# Patient Record
Sex: FEMALE | Race: WHITE | NOT HISPANIC OR LATINO | Employment: UNEMPLOYED | URBAN - METROPOLITAN AREA
[De-identification: names, ages, dates, MRNs, and addresses within clinical notes are randomized per-mention and may not be internally consistent; named-entity substitution may affect disease eponyms.]

---

## 2017-06-01 ENCOUNTER — OFFICE VISIT (OUTPATIENT)
Dept: OPTOMETRY | Facility: CLINIC | Age: 62
End: 2017-06-01
Payer: COMMERCIAL

## 2017-06-01 DIAGNOSIS — H04.123 DRY EYE SYNDROME, BILATERAL: ICD-10-CM

## 2017-06-01 DIAGNOSIS — H25.13 NUCLEAR SCLEROSIS, BILATERAL: Primary | ICD-10-CM

## 2017-06-01 DIAGNOSIS — H52.7 REFRACTIVE ERROR: ICD-10-CM

## 2017-06-01 DIAGNOSIS — H31.003 CHORIORETINAL SCAR, BILATERAL: ICD-10-CM

## 2017-06-01 PROCEDURE — 92015 DETERMINE REFRACTIVE STATE: CPT | Mod: S$GLB,,, | Performed by: OPTOMETRIST

## 2017-06-01 PROCEDURE — 99999 PR PBB SHADOW E&M-EST. PATIENT-LVL II: CPT | Mod: PBBFAC,,, | Performed by: OPTOMETRIST

## 2017-06-01 PROCEDURE — 92014 COMPRE OPH EXAM EST PT 1/>: CPT | Mod: S$GLB,,, | Performed by: OPTOMETRIST

## 2017-06-01 NOTE — PROGRESS NOTES
HPI     Presenting Complaint: Pt here today for yearly eye exam.     Pt states distance vision is stable with current glasses. Uses OTC readers   for small print.     (+) OTC allergy drops as needed for itching     (-)pain  (-) headaches  (-) diplopia   (-) flashes / (-) floaters      Last edited by Saad Hernandez, OD on 6/1/2017  2:31 PM. (History)        ROS     Positive for: Eyes    Negative for: Constitutional, Gastrointestinal, Neurological, Skin,   Genitourinary, Musculoskeletal, HENT, Endocrine, Cardiovascular,   Respiratory, Psychiatric, Allergic/Imm, Heme/Lymph    Last edited by Saad Hernandez, OD on 6/1/2017  2:31 PM. (History)        Assessment /Plan     For exam results, see Encounter Report.    Nuclear sclerosis, bilateral    Refractive error    Chorioretinal scar, bilateral    Dry eye syndrome, bilateral      Mild NS OU, OD > OS. Discussed possible ocular affects of cataracts. Acceptable BCVA OU. Discussed treatment options. Surgery not recommended at this time. Monitor yearly.     Dispensed updated spectacle Rx. Discussed various spectacle lens options. Discussed adaptation period to new specs.     Chorioretinal scar s/p laser repair OU for retinal tear/hole. Retina flat, intact, no holes, tears, breaks, RD. Monitor yearly.     Occasional GABRIELA OU, recommend otc artificial tears as needed. Return if symptoms worsening or fail to improve with tear use.     RTC in 1 year for comprehensive eye exam, or sooner prn.

## 2017-07-14 ENCOUNTER — OFFICE VISIT (OUTPATIENT)
Dept: OPHTHALMOLOGY | Facility: CLINIC | Age: 62
End: 2017-07-14
Payer: COMMERCIAL

## 2017-07-14 DIAGNOSIS — H33.311 RETINAL TEAR OF RIGHT EYE: ICD-10-CM

## 2017-07-14 DIAGNOSIS — H43.813 POSTERIOR VITREOUS DETACHMENT, BILATERAL: Primary | ICD-10-CM

## 2017-07-14 DIAGNOSIS — H33.322 RETINA HOLE, LEFT: ICD-10-CM

## 2017-07-14 PROCEDURE — 92226 PR SPECIAL EYE EXAM, SUBSEQUENT: CPT | Mod: RT,S$GLB,, | Performed by: OPHTHALMOLOGY

## 2017-07-14 PROCEDURE — 92014 COMPRE OPH EXAM EST PT 1/>: CPT | Mod: S$GLB,,, | Performed by: OPHTHALMOLOGY

## 2017-07-14 PROCEDURE — 99999 PR PBB SHADOW E&M-EST. PATIENT-LVL II: CPT | Mod: PBBFAC,,, | Performed by: OPHTHALMOLOGY

## 2017-07-14 RX ORDER — BENZOCAINE .13; .15; .5; 2 G/100G; G/100G; G/100G; G/100G
1 GEL ORAL DAILY
COMMUNITY
End: 2018-11-07 | Stop reason: ALTCHOICE

## 2017-07-14 NOTE — PROGRESS NOTES
HPI     DLS [id number] Dr. Aguilera           6/1/17 Dr. Hernandez    60 y/o F states approx 4 mo ago started having flashes OD.  They have   increased in frequency; used to be once daily, now few times per day.  No   flashes OS.  No floaters noticed OU.  Va stable OU.  VF full OU.    HX Retinal Tear  W/ heme OD s/p Retinopexy with Focal laser X2        Retinal Hole OS s/p Retinopexy       Last edited by Valerio Self MD on 7/14/2017  4:04 PM. (History)            Assessment /Plan     For exam results, see Encounter Report.    Posterior vitreous detachment, bilateral    Retinal tear of right eye    Retina hole, left      Stable  No new breaks    Pathology of PVD, Retinal Tear, Retinal Detachment reviewed in great detail  RD precautions discussed in detail, patient expressed understanding  RTC q 1 yr, sooner PRN (especially ANY change flashes, floaters, vision, visual field)

## 2017-07-31 ENCOUNTER — OFFICE VISIT (OUTPATIENT)
Dept: OPTOMETRY | Facility: CLINIC | Age: 62
End: 2017-07-31
Payer: COMMERCIAL

## 2017-07-31 DIAGNOSIS — H10.13 ALLERGIC CONJUNCTIVITIS, BILATERAL: Primary | ICD-10-CM

## 2017-07-31 PROCEDURE — 92012 INTRM OPH EXAM EST PATIENT: CPT | Mod: S$GLB,,, | Performed by: OPTOMETRIST

## 2017-07-31 PROCEDURE — 99999 PR PBB SHADOW E&M-EST. PATIENT-LVL II: CPT | Mod: PBBFAC,,, | Performed by: OPTOMETRIST

## 2017-07-31 RX ORDER — FLUOROMETHOLONE 1 MG/ML
1 SUSPENSION/ DROPS OPHTHALMIC 4 TIMES DAILY
Qty: 5 ML | Refills: 0 | Status: SHIPPED | OUTPATIENT
Start: 2017-07-31 | End: 2017-11-01

## 2017-07-31 NOTE — PROGRESS NOTES
HPI     Presenting Complaint: Pt here today for red, itchy, sensitive to light,   feels like sand in is both eyes x 4 weeks.     Location: Both eyes  Pain: : Level 1 or 2 today  Timing: the last 4 weeks  Ophthalmic medication / drops: OTC allergy drops 2 x day    (+) Crusty eyes in the morning and tear through out the day    (-) headaches  (-) diplopia   (-) No new flashes / (-)No new floaters      Last edited by Saad Hernandez, OD on 7/31/2017  1:20 PM. (History)            Assessment /Plan     For exam results, see Encounter Report.    Allergic conjunctivitis, bilateral  -     fluorometholone 0.1% (FML) 0.1 % DrpS; Place 1 drop into both eyes 4 (four) times daily.  Dispense: 5 mL; Refill: 0      Allergic conjunctivitis OU, OD >> OS. Continue OTC allergy drops twice daily. Start FML 0.1%: 1 gt qid OU, shake well. OTC benadryl as needed. Cool compresses for comfort. Return on Friday for f/u, or sooner if any worsening.

## 2017-10-31 ENCOUNTER — DOCUMENTATION ONLY (OUTPATIENT)
Dept: FAMILY MEDICINE | Facility: CLINIC | Age: 62
End: 2017-10-31

## 2017-10-31 NOTE — PROGRESS NOTES
Pre-Visit Chart Review  For Appointment Scheduled on 11/01/17    Health Maintenance Due   Topic Date Due    TETANUS VACCINE  11/06/1973    Influenza Vaccine  08/01/2017    Mammogram  11/08/2017

## 2017-11-01 ENCOUNTER — OFFICE VISIT (OUTPATIENT)
Dept: FAMILY MEDICINE | Facility: CLINIC | Age: 62
End: 2017-11-01
Payer: COMMERCIAL

## 2017-11-01 ENCOUNTER — LAB VISIT (OUTPATIENT)
Dept: LAB | Facility: HOSPITAL | Age: 62
End: 2017-11-01
Attending: PHYSICIAN ASSISTANT
Payer: COMMERCIAL

## 2017-11-01 VITALS
TEMPERATURE: 98 F | SYSTOLIC BLOOD PRESSURE: 131 MMHG | HEART RATE: 63 BPM | HEIGHT: 66 IN | DIASTOLIC BLOOD PRESSURE: 70 MMHG | BODY MASS INDEX: 22.43 KG/M2 | WEIGHT: 139.56 LBS

## 2017-11-01 DIAGNOSIS — E61.1 LOW IRON: ICD-10-CM

## 2017-11-01 DIAGNOSIS — Z00.00 ANNUAL PHYSICAL EXAM: Primary | ICD-10-CM

## 2017-11-01 DIAGNOSIS — Z13.220 SCREENING CHOLESTEROL LEVEL: ICD-10-CM

## 2017-11-01 DIAGNOSIS — R53.83 FATIGUE, UNSPECIFIED TYPE: ICD-10-CM

## 2017-11-01 DIAGNOSIS — Z23 NEEDS FLU SHOT: ICD-10-CM

## 2017-11-01 DIAGNOSIS — Z23 NEED FOR TETANUS BOOSTER: ICD-10-CM

## 2017-11-01 DIAGNOSIS — Z12.39 SCREENING FOR BREAST CANCER: ICD-10-CM

## 2017-11-01 DIAGNOSIS — Z00.00 ANNUAL PHYSICAL EXAM: ICD-10-CM

## 2017-11-01 LAB
25(OH)D3+25(OH)D2 SERPL-MCNC: 31 NG/ML
ALBUMIN SERPL BCP-MCNC: 3.3 G/DL
ALP SERPL-CCNC: 92 U/L
ALT SERPL W/O P-5'-P-CCNC: 26 U/L
ANION GAP SERPL CALC-SCNC: 6 MMOL/L
AST SERPL-CCNC: 27 U/L
BASOPHILS # BLD AUTO: 0.05 K/UL
BASOPHILS NFR BLD: 1.4 %
BILIRUB SERPL-MCNC: 1 MG/DL
BUN SERPL-MCNC: 9 MG/DL
CALCIUM SERPL-MCNC: 9.3 MG/DL
CHLORIDE SERPL-SCNC: 106 MMOL/L
CHOLEST SERPL-MCNC: 239 MG/DL
CHOLEST/HDLC SERPL: 4 {RATIO}
CO2 SERPL-SCNC: 29 MMOL/L
CREAT SERPL-MCNC: 0.9 MG/DL
DIFFERENTIAL METHOD: ABNORMAL
EOSINOPHIL # BLD AUTO: 0.1 K/UL
EOSINOPHIL NFR BLD: 3.1 %
ERYTHROCYTE [DISTWIDTH] IN BLOOD BY AUTOMATED COUNT: 13.1 %
EST. GFR  (AFRICAN AMERICAN): >60 ML/MIN/1.73 M^2
EST. GFR  (NON AFRICAN AMERICAN): >60 ML/MIN/1.73 M^2
FERRITIN SERPL-MCNC: 44 NG/ML
GLUCOSE SERPL-MCNC: 79 MG/DL
HCT VFR BLD AUTO: 42.1 %
HDLC SERPL-MCNC: 60 MG/DL
HDLC SERPL: 25.1 %
HGB BLD-MCNC: 13.8 G/DL
IMM GRANULOCYTES # BLD AUTO: 0 K/UL
IMM GRANULOCYTES NFR BLD AUTO: 0 %
IRON SERPL-MCNC: 92 UG/DL
LDLC SERPL CALC-MCNC: 162.2 MG/DL
LYMPHOCYTES # BLD AUTO: 1 K/UL
LYMPHOCYTES NFR BLD: 27.4 %
MCH RBC QN AUTO: 30.2 PG
MCHC RBC AUTO-ENTMCNC: 32.8 G/DL
MCV RBC AUTO: 92 FL
MONOCYTES # BLD AUTO: 0.3 K/UL
MONOCYTES NFR BLD: 8.1 %
NEUTROPHILS # BLD AUTO: 2.2 K/UL
NEUTROPHILS NFR BLD: 60 %
NONHDLC SERPL-MCNC: 179 MG/DL
NRBC BLD-RTO: 0 /100 WBC
PLATELET # BLD AUTO: 168 K/UL
PMV BLD AUTO: 11.4 FL
POTASSIUM SERPL-SCNC: 4.7 MMOL/L
PROT SERPL-MCNC: 7.1 G/DL
RBC # BLD AUTO: 4.57 M/UL
SATURATED IRON: 26 %
SODIUM SERPL-SCNC: 141 MMOL/L
TOTAL IRON BINDING CAPACITY: 354 UG/DL
TRANSFERRIN SERPL-MCNC: 239 MG/DL
TRIGL SERPL-MCNC: 84 MG/DL
TSH SERPL DL<=0.005 MIU/L-ACNC: 2.68 UIU/ML
VIT B12 SERPL-MCNC: 373 PG/ML
WBC # BLD AUTO: 3.58 K/UL

## 2017-11-01 PROCEDURE — 82728 ASSAY OF FERRITIN: CPT

## 2017-11-01 PROCEDURE — 85025 COMPLETE CBC W/AUTO DIFF WBC: CPT

## 2017-11-01 PROCEDURE — 90472 IMMUNIZATION ADMIN EACH ADD: CPT | Mod: S$GLB,,, | Performed by: FAMILY MEDICINE

## 2017-11-01 PROCEDURE — 90686 IIV4 VACC NO PRSV 0.5 ML IM: CPT | Mod: S$GLB,,, | Performed by: FAMILY MEDICINE

## 2017-11-01 PROCEDURE — 99396 PREV VISIT EST AGE 40-64: CPT | Mod: S$GLB,,, | Performed by: PHYSICIAN ASSISTANT

## 2017-11-01 PROCEDURE — 99999 PR PBB SHADOW E&M-EST. PATIENT-LVL III: CPT | Mod: PBBFAC,,, | Performed by: PHYSICIAN ASSISTANT

## 2017-11-01 PROCEDURE — 80061 LIPID PANEL: CPT

## 2017-11-01 PROCEDURE — 90471 IMMUNIZATION ADMIN: CPT | Mod: S$GLB,,, | Performed by: FAMILY MEDICINE

## 2017-11-01 PROCEDURE — 80053 COMPREHEN METABOLIC PANEL: CPT

## 2017-11-01 PROCEDURE — 82607 VITAMIN B-12: CPT

## 2017-11-01 PROCEDURE — 90715 TDAP VACCINE 7 YRS/> IM: CPT | Mod: S$GLB,,, | Performed by: FAMILY MEDICINE

## 2017-11-01 PROCEDURE — 83540 ASSAY OF IRON: CPT

## 2017-11-01 PROCEDURE — 84443 ASSAY THYROID STIM HORMONE: CPT

## 2017-11-01 PROCEDURE — 82306 VITAMIN D 25 HYDROXY: CPT

## 2017-11-01 PROCEDURE — 36415 COLL VENOUS BLD VENIPUNCTURE: CPT | Mod: PO

## 2017-11-01 RX ORDER — TRIAMCINOLONE ACETONIDE 1 MG/G
CREAM TOPICAL 2 TIMES DAILY PRN
Qty: 45 G | Refills: 1 | Status: SHIPPED | OUTPATIENT
Start: 2017-11-01 | End: 2018-11-07

## 2017-11-01 RX ORDER — MECLIZINE HYDROCHLORIDE 25 MG/1
25 TABLET ORAL 3 TIMES DAILY PRN
Qty: 30 TABLET | Refills: 1 | Status: SHIPPED | OUTPATIENT
Start: 2017-11-01 | End: 2018-11-07 | Stop reason: SDUPTHER

## 2017-11-01 RX ORDER — PHENAZOPYRIDINE HYDROCHLORIDE 200 MG/1
200 TABLET, FILM COATED ORAL EVERY 6 HOURS PRN
Qty: 30 TABLET | Refills: 2 | Status: SHIPPED | OUTPATIENT
Start: 2017-11-01 | End: 2018-11-07 | Stop reason: SDUPTHER

## 2017-11-01 RX ORDER — ESCITALOPRAM OXALATE 10 MG/1
10 TABLET ORAL DAILY
Qty: 90 TABLET | Refills: 3 | Status: SHIPPED | OUTPATIENT
Start: 2017-11-01 | End: 2018-11-07 | Stop reason: SDUPTHER

## 2017-11-01 NOTE — PROGRESS NOTES
Subjective:       Patient ID: Tamica Cardona is a 61 y.o. female.    Chief Complaint: Annual Exam    HPI   Patient is a 61 year old  female IC, Allergies, History of breast cancer, psoriasis presenting to the clinic for annual physical exam. She is doing well at this time, but is concerned about being more fatigued than normal. She will be due for mammogram 11/8/17. We will place orders today. She is also due for fasting labs. She agrees to get flu & tdap vaccinations today.    Review of Systems   Constitutional: Positive for fatigue. Negative for activity change, appetite change, chills, diaphoresis and fever.   HENT: Positive for hearing loss. Negative for congestion, postnasal drip and rhinorrhea.    Respiratory: Negative.  Negative for cough, shortness of breath and wheezing.    Cardiovascular: Negative.  Negative for chest pain.   Gastrointestinal: Negative for abdominal pain, blood in stool, constipation, diarrhea, nausea and vomiting.   Genitourinary: Negative for dysuria, frequency, hematuria and urgency.   Musculoskeletal: Negative.    Skin: Negative.  Negative for color change and rash.   Neurological: Negative for dizziness and syncope.   Psychiatric/Behavioral: Negative for agitation, behavioral problems and confusion.       Objective:      Physical Exam   Constitutional: Vital signs are normal. She appears well-developed and well-nourished. No distress.   HENT:   Head: Normocephalic and atraumatic.   Right Ear: External ear normal. Decreased hearing is noted.   Left Ear: External ear normal. Decreased hearing is noted.   Nose: Nose normal.   Mouth/Throat: Oropharynx is clear and moist.   Wearing hearing aids bilaterally   Eyes: Conjunctivae are normal. Pupils are equal, round, and reactive to light.   Cardiovascular: Normal rate, regular rhythm, S1 normal, S2 normal and normal heart sounds.  Exam reveals no gallop.    No murmur heard.  Pulses:       Radial pulses are 2+ on the right side,  and 2+ on the left side.   <2sec cap refill fingers bilat     Pulmonary/Chest: Effort normal and breath sounds normal. No respiratory distress. She has no wheezes. She has no rhonchi.   Abdominal: Soft. Bowel sounds are normal. She exhibits no distension and no mass. There is no tenderness. There is no rebound and no guarding.   Skin: Skin is warm and dry. She is not diaphoretic.   Appropriate skin turgor   Psychiatric: She has a normal mood and affect. Her speech is normal and behavior is normal. Judgment and thought content normal. Cognition and memory are normal.       Assessment:       1. Annual physical exam    2. Fatigue, unspecified type    3. Low iron    4. Screening cholesterol level    5. Screening for breast cancer    6. Need for tetanus booster    7. Needs flu shot        Plan:       Tamica was seen today for annual exam.    Diagnoses and all orders for this visit:    Annual physical exam  -     CBC auto differential; Future  -     Comprehensive metabolic panel; Future  -     Lipid panel; Future  -     Vitamin D; Future    Fatigue, unspecified type  -     TSH; Future  -     Vitamin B12; Future    Low iron  -     Iron and TIBC; Future  -     Ferritin; Future    Screening cholesterol level  -     Lipid panel; Future    Screening for breast cancer  -     Mammo Digital Screening Bilateral With CAD; Future    Need for tetanus booster  -     (In Office Administered) Tdap Vaccine    Needs flu shot  -     Influenza - Quadrivalent (3 years & older) (PF)    Other orders  -     triamcinolone acetonide 0.1% (KENALOG) 0.1 % cream; Apply topically 2 (two) times daily as needed.  -     phenazopyridine (PYRIDIUM) 200 MG tablet; Take 1 tablet (200 mg total) by mouth every 6 (six) hours as needed.  -     meclizine (ANTIVERT) 25 mg tablet; Take 1 tablet (25 mg total) by mouth 3 (three) times daily as needed.  -     escitalopram oxalate (LEXAPRO) 10 MG tablet; Take 1 tablet (10 mg total) by mouth once daily.

## 2017-11-10 ENCOUNTER — HOSPITAL ENCOUNTER (OUTPATIENT)
Dept: RADIOLOGY | Facility: CLINIC | Age: 62
Discharge: HOME | End: 2017-11-10
Attending: PHYSICIAN ASSISTANT
Payer: COMMERCIAL

## 2017-11-10 DIAGNOSIS — Z12.39 SCREENING FOR BREAST CANCER: ICD-10-CM

## 2017-11-10 PROCEDURE — 77063 BREAST TOMOSYNTHESIS BI: CPT | Mod: 26,,, | Performed by: RADIOLOGY

## 2017-11-10 PROCEDURE — 77067 SCR MAMMO BI INCL CAD: CPT | Mod: TC

## 2017-11-10 PROCEDURE — 77067 SCR MAMMO BI INCL CAD: CPT | Mod: 26,,, | Performed by: RADIOLOGY

## 2018-10-17 ENCOUNTER — OFFICE VISIT (OUTPATIENT)
Dept: OPTOMETRY | Facility: CLINIC | Age: 63
End: 2018-10-17
Payer: COMMERCIAL

## 2018-10-17 DIAGNOSIS — H52.7 REFRACTIVE ERROR: ICD-10-CM

## 2018-10-17 DIAGNOSIS — H31.003 CHORIORETINAL SCAR, BILATERAL: Primary | ICD-10-CM

## 2018-10-17 DIAGNOSIS — H25.13 NUCLEAR SCLEROSIS, BILATERAL: ICD-10-CM

## 2018-10-17 PROCEDURE — 92014 COMPRE OPH EXAM EST PT 1/>: CPT | Mod: S$GLB,,, | Performed by: OPTOMETRIST

## 2018-10-17 PROCEDURE — 99999 PR PBB SHADOW E&M-EST. PATIENT-LVL III: CPT | Mod: PBBFAC,,, | Performed by: OPTOMETRIST

## 2018-10-17 NOTE — PROGRESS NOTES
HPI     Presenting Complaint: Pt here today for yearly ocular health check. Pt   states vision has stable with current glasses, Pt uses rx distance and rx   glasses for near.     Ophthalmic medication / drops:None    (-) headaches  (-) diplopia   (-) flashes / (-) floaters      Last edited by Jaylen Nix on 10/17/2018  8:08 AM. (History)            Assessment /Plan     For exam results, see Encounter Report.    Chorioretinal scar, bilateral    Nuclear sclerosis, bilateral    Refractive error      Chorioretinal scar s/p laser repair OU for retinal tear/hole. Retina flat, intact, no holes, tears, breaks, RD OU. RTC immediately if any flashes, floaters, decreased vision, veiling of vision, or trauma occurs. Otherwise, monitor yearly.     Mild cataracts of both eyes, not visually significant. Discussed possible ocular affects of cataracts. Acceptable BCVA OU. Discussed treatment options. Surgery not recommended at this time. Monitor yearly.     Patient doing well with current specs, SVL distance and near only. Denies refraction. Return as needed for updated spec Rx.      RTC in 1 year for comprehensive eye exam, or sooner prn.

## 2018-11-06 ENCOUNTER — DOCUMENTATION ONLY (OUTPATIENT)
Dept: FAMILY MEDICINE | Facility: CLINIC | Age: 63
End: 2018-11-06

## 2018-11-06 NOTE — PROGRESS NOTES
Pre-Visit Chart Review  For Appointment Scheduled on 11/07/18    Health Maintenance Due   Topic Date Due    Influenza Vaccine  08/01/2018    Mammogram  11/10/2018

## 2018-11-07 ENCOUNTER — OFFICE VISIT (OUTPATIENT)
Dept: FAMILY MEDICINE | Facility: CLINIC | Age: 63
End: 2018-11-07
Payer: COMMERCIAL

## 2018-11-07 VITALS
SYSTOLIC BLOOD PRESSURE: 118 MMHG | DIASTOLIC BLOOD PRESSURE: 62 MMHG | WEIGHT: 142.19 LBS | TEMPERATURE: 99 F | HEART RATE: 65 BPM | HEIGHT: 66 IN | BODY MASS INDEX: 22.85 KG/M2

## 2018-11-07 DIAGNOSIS — F32.A DEPRESSION, UNSPECIFIED DEPRESSION TYPE: ICD-10-CM

## 2018-11-07 DIAGNOSIS — Z00.00 ANNUAL PHYSICAL EXAM: Primary | ICD-10-CM

## 2018-11-07 DIAGNOSIS — E78.5 HYPERLIPIDEMIA, UNSPECIFIED HYPERLIPIDEMIA TYPE: ICD-10-CM

## 2018-11-07 DIAGNOSIS — M85.80 OSTEOPENIA, UNSPECIFIED LOCATION: ICD-10-CM

## 2018-11-07 DIAGNOSIS — Z23 NEEDS FLU SHOT: ICD-10-CM

## 2018-11-07 DIAGNOSIS — Z12.39 SCREENING FOR BREAST CANCER: ICD-10-CM

## 2018-11-07 PROCEDURE — 99396 PREV VISIT EST AGE 40-64: CPT | Mod: 25,S$GLB,, | Performed by: PHYSICIAN ASSISTANT

## 2018-11-07 PROCEDURE — 90686 IIV4 VACC NO PRSV 0.5 ML IM: CPT | Mod: S$GLB,,, | Performed by: PHYSICIAN ASSISTANT

## 2018-11-07 PROCEDURE — 90471 IMMUNIZATION ADMIN: CPT | Mod: S$GLB,,, | Performed by: PHYSICIAN ASSISTANT

## 2018-11-07 PROCEDURE — 99999 PR PBB SHADOW E&M-EST. PATIENT-LVL III: CPT | Mod: PBBFAC,,, | Performed by: PHYSICIAN ASSISTANT

## 2018-11-07 RX ORDER — CLOBETASOL PROPIONATE 0.5 MG/G
OINTMENT TOPICAL 2 TIMES DAILY
Qty: 45 G | Refills: 2 | Status: SHIPPED | OUTPATIENT
Start: 2018-11-07 | End: 2018-11-16

## 2018-11-07 RX ORDER — MECLIZINE HYDROCHLORIDE 25 MG/1
25 TABLET ORAL 3 TIMES DAILY PRN
Qty: 30 TABLET | Refills: 1 | Status: SHIPPED | OUTPATIENT
Start: 2018-11-07 | End: 2019-12-02 | Stop reason: SDUPTHER

## 2018-11-07 RX ORDER — PHENAZOPYRIDINE HYDROCHLORIDE 200 MG/1
200 TABLET, FILM COATED ORAL EVERY 6 HOURS PRN
Qty: 30 TABLET | Refills: 2 | Status: SHIPPED | OUTPATIENT
Start: 2018-11-07 | End: 2022-10-10 | Stop reason: ALTCHOICE

## 2018-11-07 RX ORDER — ESCITALOPRAM OXALATE 10 MG/1
10 TABLET ORAL DAILY
Qty: 90 TABLET | Refills: 3 | Status: SHIPPED | OUTPATIENT
Start: 2018-11-07 | End: 2019-10-28 | Stop reason: SDUPTHER

## 2018-11-07 NOTE — PROGRESS NOTES
Subjective:       Patient ID: Tamica Cardona is a 63 y.o. female.    Chief Complaint: Annual Exam    HPI   Patient is a 63 year old  female presenting to the clinic for routine annual physical. She is doing well at this time. She is due for fasting labs & agrees to come back for these. Patient also due for screening mammogram and will get this scheduled as well. Patient reports lexapro is still working well for her depression symptoms. She is concerned about eczema-like patch on soles of her hand which is chronic in nature. Kenalog cream used to help some, but is no longer providing relief.   Review of Systems   Constitutional: Negative for activity change, appetite change, chills, diaphoresis, fatigue and fever.   HENT: Negative for congestion, postnasal drip and rhinorrhea.    Respiratory: Negative.  Negative for cough, shortness of breath and wheezing.    Cardiovascular: Negative.  Negative for chest pain.   Gastrointestinal: Negative for abdominal pain, blood in stool, constipation, diarrhea, nausea and vomiting.   Genitourinary: Negative for dysuria, frequency, hematuria and urgency.   Musculoskeletal: Negative.    Skin: Positive for rash. Negative for color change.   Neurological: Negative for dizziness and syncope.   Psychiatric/Behavioral: Negative for agitation, behavioral problems and confusion.       Objective:      Physical Exam   Constitutional: Vital signs are normal. She appears well-developed and well-nourished. No distress.   Cardiovascular: Normal rate, regular rhythm, S1 normal, S2 normal and normal heart sounds. Exam reveals no gallop.   No murmur heard.  Pulses:       Radial pulses are 2+ on the right side, and 2+ on the left side.   Pulmonary/Chest: Effort normal and breath sounds normal. No respiratory distress. She has no wheezes. She has no rhonchi.   Skin: Skin is warm and dry. Rash noted. She is not diaphoretic.   Dry, peeling skin on palm of right hand, minimal redness    Psychiatric: She has a normal mood and affect. Her speech is normal and behavior is normal. Judgment and thought content normal. Cognition and memory are normal.       Assessment:       1. Annual physical exam    2. Hyperlipidemia, unspecified hyperlipidemia type    3. Depression, unspecified depression type    4. Osteopenia, unspecified location    5. Screening for breast cancer    6. Needs flu shot        Plan:       Tamica was seen today for annual exam.    Diagnoses and all orders for this visit:    Annual physical exam  -     CBC auto differential; Future  -     Comprehensive metabolic panel; Future  -     Lipid panel; Future  -     TSH; Future  -     Vitamin D; Future    Hyperlipidemia, unspecified hyperlipidemia type  -     Comprehensive metabolic panel; Future  -     Lipid panel; Future    Depression, unspecified depression type  -     CBC auto differential; Future  -     Comprehensive metabolic panel; Future  -     TSH; Future    Osteopenia, unspecified location  -     CBC auto differential; Future  -     Vitamin D; Future    Screening for breast cancer  -     Mammo Digital Screening Bilat; Future    Needs flu shot  -     Influenza - Quadrivalent (3 years & older) (PF)    Other orders  -     meclizine (ANTIVERT) 25 mg tablet; Take 1 tablet (25 mg total) by mouth 3 (three) times daily as needed.  -     phenazopyridine (PYRIDIUM) 200 MG tablet; Take 1 tablet (200 mg total) by mouth every 6 (six) hours as needed.  -     escitalopram oxalate (LEXAPRO) 10 MG tablet; Take 1 tablet (10 mg total) by mouth once daily.  -     clobetasol 0.05% (TEMOVATE) 0.05 % Oint; Apply topically 2 (two) times daily.

## 2018-11-09 ENCOUNTER — LAB VISIT (OUTPATIENT)
Dept: LAB | Facility: HOSPITAL | Age: 63
End: 2018-11-09
Attending: PHYSICIAN ASSISTANT
Payer: COMMERCIAL

## 2018-11-09 DIAGNOSIS — E78.5 HYPERLIPIDEMIA, UNSPECIFIED HYPERLIPIDEMIA TYPE: ICD-10-CM

## 2018-11-09 DIAGNOSIS — F32.A DEPRESSION, UNSPECIFIED DEPRESSION TYPE: ICD-10-CM

## 2018-11-09 DIAGNOSIS — M85.80 OSTEOPENIA, UNSPECIFIED LOCATION: ICD-10-CM

## 2018-11-09 DIAGNOSIS — Z00.00 ANNUAL PHYSICAL EXAM: ICD-10-CM

## 2018-11-09 LAB
25(OH)D3+25(OH)D2 SERPL-MCNC: 28 NG/ML
ALBUMIN SERPL BCP-MCNC: 3.5 G/DL
ALP SERPL-CCNC: 79 U/L
ALT SERPL W/O P-5'-P-CCNC: 15 U/L
ANION GAP SERPL CALC-SCNC: 7 MMOL/L
AST SERPL-CCNC: 22 U/L
BASOPHILS # BLD AUTO: 0.05 K/UL
BASOPHILS NFR BLD: 1.7 %
BILIRUB SERPL-MCNC: 1.1 MG/DL
BUN SERPL-MCNC: 10 MG/DL
CALCIUM SERPL-MCNC: 9.7 MG/DL
CHLORIDE SERPL-SCNC: 109 MMOL/L
CHOLEST SERPL-MCNC: 222 MG/DL
CHOLEST/HDLC SERPL: 3.5 {RATIO}
CO2 SERPL-SCNC: 27 MMOL/L
CREAT SERPL-MCNC: 0.8 MG/DL
DIFFERENTIAL METHOD: ABNORMAL
EOSINOPHIL # BLD AUTO: 0.1 K/UL
EOSINOPHIL NFR BLD: 2.7 %
ERYTHROCYTE [DISTWIDTH] IN BLOOD BY AUTOMATED COUNT: 13.6 %
EST. GFR  (AFRICAN AMERICAN): >60 ML/MIN/1.73 M^2
EST. GFR  (NON AFRICAN AMERICAN): >60 ML/MIN/1.73 M^2
GLUCOSE SERPL-MCNC: 85 MG/DL
HCT VFR BLD AUTO: 42 %
HDLC SERPL-MCNC: 63 MG/DL
HDLC SERPL: 28.4 %
HGB BLD-MCNC: 13.3 G/DL
IMM GRANULOCYTES # BLD AUTO: 0 K/UL
IMM GRANULOCYTES NFR BLD AUTO: 0 %
LDLC SERPL CALC-MCNC: 141 MG/DL
LYMPHOCYTES # BLD AUTO: 0.9 K/UL
LYMPHOCYTES NFR BLD: 28.8 %
MCH RBC QN AUTO: 29.7 PG
MCHC RBC AUTO-ENTMCNC: 31.7 G/DL
MCV RBC AUTO: 94 FL
MONOCYTES # BLD AUTO: 0.3 K/UL
MONOCYTES NFR BLD: 10 %
NEUTROPHILS # BLD AUTO: 1.7 K/UL
NEUTROPHILS NFR BLD: 56.8 %
NONHDLC SERPL-MCNC: 159 MG/DL
NRBC BLD-RTO: 0 /100 WBC
PLATELET # BLD AUTO: 182 K/UL
PMV BLD AUTO: 11.5 FL
POTASSIUM SERPL-SCNC: 4.9 MMOL/L
PROT SERPL-MCNC: 6.8 G/DL
RBC # BLD AUTO: 4.48 M/UL
SODIUM SERPL-SCNC: 143 MMOL/L
TRIGL SERPL-MCNC: 90 MG/DL
TSH SERPL DL<=0.005 MIU/L-ACNC: 2.2 UIU/ML
WBC # BLD AUTO: 2.99 K/UL

## 2018-11-09 PROCEDURE — 82306 VITAMIN D 25 HYDROXY: CPT

## 2018-11-09 PROCEDURE — 84443 ASSAY THYROID STIM HORMONE: CPT

## 2018-11-09 PROCEDURE — 85025 COMPLETE CBC W/AUTO DIFF WBC: CPT

## 2018-11-09 PROCEDURE — 80061 LIPID PANEL: CPT

## 2018-11-09 PROCEDURE — 36415 COLL VENOUS BLD VENIPUNCTURE: CPT | Mod: PO

## 2018-11-09 PROCEDURE — 80053 COMPREHEN METABOLIC PANEL: CPT

## 2018-11-12 ENCOUNTER — HOSPITAL ENCOUNTER (OUTPATIENT)
Dept: RADIOLOGY | Facility: CLINIC | Age: 63
Discharge: HOME | End: 2018-11-12
Attending: PHYSICIAN ASSISTANT
Payer: COMMERCIAL

## 2018-11-12 DIAGNOSIS — Z12.39 SCREENING FOR BREAST CANCER: ICD-10-CM

## 2018-11-12 PROCEDURE — 77063 BREAST TOMOSYNTHESIS BI: CPT | Mod: 26,,, | Performed by: RADIOLOGY

## 2018-11-12 PROCEDURE — 77063 BREAST TOMOSYNTHESIS BI: CPT | Mod: TC,PO

## 2018-11-12 PROCEDURE — 77067 SCR MAMMO BI INCL CAD: CPT | Mod: 26,,, | Performed by: RADIOLOGY

## 2018-11-15 ENCOUNTER — PATIENT MESSAGE (OUTPATIENT)
Dept: FAMILY MEDICINE | Facility: CLINIC | Age: 63
End: 2018-11-15

## 2018-11-15 DIAGNOSIS — D72.810 LYMPHOCYTOPENIA: Primary | ICD-10-CM

## 2018-11-16 RX ORDER — DESOXIMETASONE 0.5 MG/G
CREAM TOPICAL 2 TIMES DAILY
Qty: 100 G | Refills: 0 | Status: SHIPPED | OUTPATIENT
Start: 2018-11-16 | End: 2019-01-23 | Stop reason: ALTCHOICE

## 2018-12-03 ENCOUNTER — PATIENT MESSAGE (OUTPATIENT)
Dept: FAMILY MEDICINE | Facility: CLINIC | Age: 63
End: 2018-12-03

## 2018-12-17 ENCOUNTER — LAB VISIT (OUTPATIENT)
Dept: LAB | Facility: HOSPITAL | Age: 63
End: 2018-12-17
Attending: PHYSICIAN ASSISTANT
Payer: COMMERCIAL

## 2018-12-17 DIAGNOSIS — D72.810 LYMPHOCYTOPENIA: ICD-10-CM

## 2018-12-17 LAB
BASOPHILS # BLD AUTO: 0.04 K/UL
BASOPHILS NFR BLD: 1.1 %
DIFFERENTIAL METHOD: ABNORMAL
EOSINOPHIL # BLD AUTO: 0.1 K/UL
EOSINOPHIL NFR BLD: 2.8 %
ERYTHROCYTE [DISTWIDTH] IN BLOOD BY AUTOMATED COUNT: 12.9 %
HCT VFR BLD AUTO: 42.4 %
HGB BLD-MCNC: 14.2 G/DL
IMM GRANULOCYTES # BLD AUTO: 0.01 K/UL
IMM GRANULOCYTES NFR BLD AUTO: 0.3 %
LYMPHOCYTES # BLD AUTO: 0.9 K/UL
LYMPHOCYTES NFR BLD: 24.7 %
MCH RBC QN AUTO: 30.5 PG
MCHC RBC AUTO-ENTMCNC: 33.5 G/DL
MCV RBC AUTO: 91 FL
MONOCYTES # BLD AUTO: 0.3 K/UL
MONOCYTES NFR BLD: 7.2 %
NEUTROPHILS # BLD AUTO: 2.3 K/UL
NEUTROPHILS NFR BLD: 63.9 %
NRBC BLD-RTO: 0 /100 WBC
PLATELET # BLD AUTO: 169 K/UL
PMV BLD AUTO: 11.4 FL
RBC # BLD AUTO: 4.66 M/UL
WBC # BLD AUTO: 3.6 K/UL

## 2018-12-17 PROCEDURE — 85025 COMPLETE CBC W/AUTO DIFF WBC: CPT

## 2018-12-17 PROCEDURE — 36415 COLL VENOUS BLD VENIPUNCTURE: CPT | Mod: PO

## 2018-12-19 ENCOUNTER — PATIENT MESSAGE (OUTPATIENT)
Dept: FAMILY MEDICINE | Facility: CLINIC | Age: 63
End: 2018-12-19

## 2019-01-22 ENCOUNTER — DOCUMENTATION ONLY (OUTPATIENT)
Dept: FAMILY MEDICINE | Facility: CLINIC | Age: 64
End: 2019-01-22

## 2019-01-22 NOTE — PROGRESS NOTES
Pre-Visit Chart Review  For Appointment Scheduled on 01/23/19    There are no preventive care reminders to display for this patient.

## 2019-01-23 ENCOUNTER — OFFICE VISIT (OUTPATIENT)
Dept: FAMILY MEDICINE | Facility: CLINIC | Age: 64
End: 2019-01-23
Payer: COMMERCIAL

## 2019-01-23 ENCOUNTER — HOSPITAL ENCOUNTER (OUTPATIENT)
Dept: RADIOLOGY | Facility: CLINIC | Age: 64
Discharge: HOME | End: 2019-01-23
Attending: PHYSICIAN ASSISTANT
Payer: COMMERCIAL

## 2019-01-23 VITALS
WEIGHT: 144.81 LBS | HEIGHT: 66 IN | SYSTOLIC BLOOD PRESSURE: 124 MMHG | BODY MASS INDEX: 23.27 KG/M2 | TEMPERATURE: 98 F | DIASTOLIC BLOOD PRESSURE: 82 MMHG | HEART RATE: 68 BPM

## 2019-01-23 DIAGNOSIS — M54.50 LOW BACK PAIN, NON-SPECIFIC: ICD-10-CM

## 2019-01-23 PROCEDURE — 3008F BODY MASS INDEX DOCD: CPT | Mod: CPTII,S$GLB,, | Performed by: PHYSICIAN ASSISTANT

## 2019-01-23 PROCEDURE — 72100 X-RAY EXAM L-S SPINE 2/3 VWS: CPT | Mod: 26,,, | Performed by: RADIOLOGY

## 2019-01-23 PROCEDURE — 72202 X-RAY EXAM SI JOINTS 3/> VWS: CPT | Mod: TC,FY,PO

## 2019-01-23 PROCEDURE — 99213 PR OFFICE/OUTPT VISIT, EST, LEVL III, 20-29 MIN: ICD-10-PCS | Mod: S$GLB,,, | Performed by: PHYSICIAN ASSISTANT

## 2019-01-23 PROCEDURE — 73502 X-RAY EXAM HIP UNI 2-3 VIEWS: CPT | Mod: TC,FY,PO,RT

## 2019-01-23 PROCEDURE — 72100 X-RAY EXAM L-S SPINE 2/3 VWS: CPT | Mod: TC,FY,PO

## 2019-01-23 PROCEDURE — 99213 OFFICE O/P EST LOW 20 MIN: CPT | Mod: S$GLB,,, | Performed by: PHYSICIAN ASSISTANT

## 2019-01-23 PROCEDURE — 72202 X-RAY EXAM SI JOINTS 3/> VWS: CPT | Mod: 26,,, | Performed by: RADIOLOGY

## 2019-01-23 PROCEDURE — 99999 PR PBB SHADOW E&M-EST. PATIENT-LVL IV: CPT | Mod: PBBFAC,,, | Performed by: PHYSICIAN ASSISTANT

## 2019-01-23 PROCEDURE — 99999 PR PBB SHADOW E&M-EST. PATIENT-LVL IV: ICD-10-PCS | Mod: PBBFAC,,, | Performed by: PHYSICIAN ASSISTANT

## 2019-01-23 PROCEDURE — 72100 XR LUMBAR SPINE AP AND LATERAL: ICD-10-PCS | Mod: 26,,, | Performed by: RADIOLOGY

## 2019-01-23 PROCEDURE — 73502 X-RAY EXAM HIP UNI 2-3 VIEWS: CPT | Mod: 26,RT,S$GLB, | Performed by: RADIOLOGY

## 2019-01-23 PROCEDURE — 73502 XR HIP 2 VIEW RIGHT: ICD-10-PCS | Mod: 26,RT,S$GLB, | Performed by: RADIOLOGY

## 2019-01-23 PROCEDURE — 72202 XR SACROILIAC JOINTS COMPLETE: ICD-10-PCS | Mod: 26,,, | Performed by: RADIOLOGY

## 2019-01-23 PROCEDURE — 3008F PR BODY MASS INDEX (BMI) DOCUMENTED: ICD-10-PCS | Mod: CPTII,S$GLB,, | Performed by: PHYSICIAN ASSISTANT

## 2019-01-23 RX ORDER — TRIAMCINOLONE ACETONIDE 1 MG/G
OINTMENT TOPICAL 3 TIMES DAILY
Qty: 80 G | Refills: 2 | Status: SHIPPED | OUTPATIENT
Start: 2019-01-23 | End: 2019-10-28

## 2019-01-23 RX ORDER — TRIAMCINOLONE ACETONIDE 1 MG/G
OINTMENT TOPICAL 3 TIMES DAILY
Qty: 80 G | Refills: 2 | Status: SHIPPED | OUTPATIENT
Start: 2019-01-23 | End: 2019-01-23 | Stop reason: SDUPTHER

## 2019-01-23 NOTE — PROGRESS NOTES
Subjective:       Patient ID: Tamica Cardona is a 63 y.o. female.    Chief Complaint: Back Pain    HPI   Patient is a 63-year-old  female presenting to the clinic with complaint of acute on chronic back pain with sciatica.  Patient was previously seeing a chiropractor for this issue years ago which seemed to help some.  She has most recently started babysitting her grandchildren for which she has to  often.  Pain comes and goes based on activity level and radiates into her buttock area.  Review of Systems   Constitutional: Negative for activity change, appetite change, chills, diaphoresis, fatigue and fever.   HENT: Negative for congestion, postnasal drip and rhinorrhea.    Respiratory: Negative.  Negative for cough, shortness of breath and wheezing.    Cardiovascular: Negative.  Negative for chest pain.   Gastrointestinal: Negative for abdominal pain, blood in stool, constipation, diarrhea, nausea and vomiting.   Genitourinary: Negative for dysuria, frequency, hematuria and urgency.   Musculoskeletal: Positive for back pain.   Skin: Negative.  Negative for color change and rash.   Neurological: Negative for dizziness and syncope.   Psychiatric/Behavioral: Negative for agitation, behavioral problems and confusion.       Objective:      Physical Exam   Constitutional: Vital signs are normal. She appears well-developed and well-nourished. No distress.   Cardiovascular: Normal rate, regular rhythm, S1 normal, S2 normal and normal heart sounds. Exam reveals no gallop.   No murmur heard.  Pulses:       Radial pulses are 2+ on the right side, and 2+ on the left side.   <2sec cap refill fingers bilat     Pulmonary/Chest: Effort normal and breath sounds normal. No respiratory distress. She has no wheezes. She has no rhonchi.   Musculoskeletal:   Mildly tender to palpation bilaterally lower back muscles, no skin changes no redness no rash appreciated.  Right straight leg test positive at about 45°.  Left  straight leg test negative   Skin: Skin is warm and dry. She is not diaphoretic.   Appropriate skin turgor   Psychiatric: She has a normal mood and affect. Her speech is normal and behavior is normal. Judgment and thought content normal. Cognition and memory are normal.       Assessment:       1. Low back pain, non-specific        Plan:       Tamica was seen today for back pain.    Diagnoses and all orders for this visit:    Low back pain, non-specific  Acute on chronic.  Will follow up after x-rays return and discuss further recommendation  .  Likely will need referral to PT and or pain management.  -     X-Ray Lumbar Spine AP And Lateral; Future  -     X-Ray Sacroiliac Joints Complete; Future  -     Cancel: X-Ray Hip 2 View Left; Future    Other orders  -     Discontinue: triamcinolone acetonide 0.1% (KENALOG) 0.1 % ointment; Apply topically 3 (three) times daily.  -     triamcinolone acetonide 0.1% (KENALOG) 0.1 % ointment; Apply topically 3 (three) times daily.

## 2019-01-24 ENCOUNTER — TELEPHONE (OUTPATIENT)
Dept: FAMILY MEDICINE | Facility: CLINIC | Age: 64
End: 2019-01-24

## 2019-01-24 DIAGNOSIS — G89.29 CHRONIC BACK PAIN GREATER THAN 3 MONTHS DURATION: ICD-10-CM

## 2019-01-24 DIAGNOSIS — M54.9 CHRONIC BACK PAIN GREATER THAN 3 MONTHS DURATION: ICD-10-CM

## 2019-01-24 DIAGNOSIS — M41.9 SCOLIOSIS OF LUMBAR SPINE, UNSPECIFIED SCOLIOSIS TYPE: Primary | ICD-10-CM

## 2019-01-24 NOTE — TELEPHONE ENCOUNTER
Referral placed. They should be giving her a call to get this set up. If she does not hear from there in the next  Week, she needs to let us know so we can reach out to them.

## 2019-01-24 NOTE — TELEPHONE ENCOUNTER
----- Message from Elena Aden sent at 1/24/2019  9:08 AM CST -----  Type: Needs Medical Advice    Who Called: Patient    Best Call Back Number: [phone] (home)     Additional Information: Received a call this morning that she being referred for physical therapy. Advised the nurse would have to check with insurance for prior approval. Wanted to advise insurance does not need approval and would like to use [facility]

## 2019-02-01 ENCOUNTER — CLINICAL SUPPORT (OUTPATIENT)
Dept: REHABILITATION | Facility: HOSPITAL | Age: 64
End: 2019-02-01
Attending: PHYSICIAN ASSISTANT
Payer: COMMERCIAL

## 2019-02-01 DIAGNOSIS — M54.5 CHRONIC LOW BACK PAIN, UNSPECIFIED BACK PAIN LATERALITY, WITH SCIATICA PRESENCE UNSPECIFIED: Primary | ICD-10-CM

## 2019-02-01 DIAGNOSIS — G89.29 CHRONIC LOW BACK PAIN, UNSPECIFIED BACK PAIN LATERALITY, WITH SCIATICA PRESENCE UNSPECIFIED: Primary | ICD-10-CM

## 2019-02-01 PROBLEM — M54.50 CHRONIC LOW BACK PAIN: Status: ACTIVE | Noted: 2019-02-01

## 2019-02-01 PROCEDURE — 97110 THERAPEUTIC EXERCISES: CPT | Mod: PN

## 2019-02-01 PROCEDURE — 97161 PT EVAL LOW COMPLEX 20 MIN: CPT | Mod: PN

## 2019-02-01 NOTE — PLAN OF CARE
TIME RECORD    Date: 02/01/2019    Start Time:  1018  Stop Time:  1103    PROCEDURES:    TIMED  Procedure Time Min.     Therex Start:  1054  Stop:  1103   9    Start:  Stop:     Start:  Stop:     Start:  Stop:          UNTIMED  Procedure Time Min.     Initial evaluation Start:  1018  Stop:  1052   34    Start:  Stop:      Total Timed Minutes:  9  Total Timed Units:  1  Total Untimed Units:  1  Charges Billed/# of units:  2    OUTPATIENT PHYSICAL THERAPY   PATIENT EVALUATION    Onset Date: Chronic  Recent change 6 months ago due to change in activity  Primary Diagnosis:   1. Chronic low back pain, unspecified back pain laterality, with sciatica presence unspecified       Treatment Diagnosis: Lower back dysfunction  Past Medical History:   Diagnosis Date    Allergy     Breast cancer 11/2003    right    Interstitial cystitis 2003    Urinary tract infection      Precautions: H/o breast CA Underwent mastectomy R (no BP or needle sticks on R)   Prior Therapy: Yes for same problem about 5 years ago (chiropractor)   Medications: Tamica ANGÉLICA Cardona has a current medication list which includes the following prescription(s): acetaminophen, aspirin, calcium carbonate, escitalopram oxalate, ferrous gluconate, allegra, meclizine, phenazopyridine, and triamcinolone acetonide 0.1%.  Nutrition:  Overweight  History of Present Illness: Pt presents to PT with history of chronic lower back pain.  She reports that several years ago she developed sudden onset lower back pain while she was sweeping (sudden twisting).  Has had intermittent episodes since that time.  She underwent chiropractic treatment about 5 years ago which helped but she stopped her exercises and has had a recurrence.  She reports that she began babysitting her 2 grandchildren about a year ago but as babies grew bigger she began having an recurrence of back pain (about 6 months ago).  Since the recurrence she has had fluctuating levels of pain depending on activity  "level.  She reports some days are bad and some are better. X-rays recently taken were positive for scoliosis but no other findings.  She is now referred to PT.    Prior Level of Function: Independent  Was limited from running but is able to walk without difficulty.  Was limited from heavy lifting.  Social History: Lives with  and cares for 2 toddlers 3 days per week.  (weigh about 30#)  Place of Residence (Steps/Adaptations): 2 story home with 1 flight of steps   Functional Deficits Leading to Referral/Nature of Injury: Difficulty lifting, performing sit<>stand transfers, car and bed transfers.  Prolonged sitting and standing.  Difficulty bending for LE bathing/dressing.  She is able to perform most household activities but does have intermittent difficulties with sweeping.    Patient Therapy Goals: To have most of the pain gone so I can do what I used to do without meds.    Subjective     Tamica Cardona states "I know exercise helps.  It hurts but if I keep it up it helps".    Pain:  Location: across lower back and deep in R buttock (upper)  Description: constant dull and aching.  Intermittent stabbing and twisting (R buttock), across back intermittent sharp pain.    Activities Which Increase Pain: Sitting, Standing, Bending, Lifting and Getting out of bed/chair  Activities Which Decrease Pain: relaxation, heating pad and changing positions, walking  Pain Scale: 0/10 at best 1/10 now  6/10 at worst    Objective     Posture: Standing: R lateral shift with R shoulder lower that L.  Pelvis level.  Sitting: decreased lumbar lordosis, increased thoracic kyphosis, minimal rounded shoulder and forward head posture.  Palpation: Tenderness deep just lateral to R SIJ.  Increased R lumbar paraspinal tissue density without tenderness.    Sensation: No paresthesias reported.    DTRs:  No deficits noted this date.   Range of Motion/Strength:   Thoracic/Lumbar AROM: Pain/Dysfunction with Movement:   Flexion              "            40* - 25* = 15*     Extension                    35* - 20* = 15*    Right side bending      35*    Left side bending        35*    Right rotation              80%    Left rotation                80%    B LE grossly WNL and 5/5  Flexibility: Hamstring length 175*; Piriformis R tight, L No tightness noted; Hip flexors minimal tightness R, L no tightness noted; ITB moderate tightness on R, no tightness on L; gastrocs minimal tightness B.    Gait: Without AD  Analysis: No deficits noted this date.   Bed Mobility: Minimal difficulty with rolling   Transfers: Independent  Increased UE support noted.    Special Tests: Joint play No deficits noted this date.   Other: FOTO completed.    Treatment:  Eval completed.  Educated pt in role of PT and proposed POC.  Verbalized understanding and agreement.  Initiated soft tissue mobilization to R medial gluts (just lateral to SIJ) followed by instruction in R lateral shift correction.  Also initiated KT taping to R lumbar paraspinals utilizing I strip anchored @ R SIJ with no tension, extending proximally along R lumbar paraspinals @ 25-30% tension, terminating @ T8 with no tension.  Instructed pt in care and removal of tape.  Verbalized understanding.      Assessment       Initial Assessment (Pertinent finding, problem list and factors affecting outcome): Pt presents to PT with history of chronic lower back dysfunction of several years duration that recently worsened as she was caring for her grandchildren due to increased weight of lifting.  Problems include: impaired posture, increased muscle tension and tenderness, intermittent lower back pain, impaired LE flexibility, significantly reduced lumbar ROM.  She will benefit from skilled PT intervention to address these problems.    Rehab Potiential: good    Short Term Goals (3 Weeks): 1) Facilitate decreased muscle tension in lumbar spine, 2) Increase lumbar flexion by 15* in order to allow patient improved mobility to  perform car transfers, 3) Improve posture in sitting and standing to allow for increased tolerance for static positions   Long Term Goals (6 Weeks): 1) Improve standing tolerance to > 1 hour so that patient can perform cooking activities without increased LBP, 2) Improve postural stability so that can consistently utilize good body mechanics for lifting activities, 3) Improve lumbar flexion to > 50* to allow for decreased difficulty performing LE bathing/dressing activities, 4) Pt will be independent in HEP.      Plan     Certification Period: 02/01/2019 to 03/15/2019  Recommended Treatment Plan: 2 times per week for 6 weeks: Manual Therapy, Moist Heat/ Ice, Patient Education, Therapeutic Activites, Therapeutic Exercise and therapeutic taping as needed  Other Recommendations: Pt may benefit from dry needling PRN to assist with symptom management.        Therapist: Emily Pineda, PT    I CERTIFY THE NEED FOR THESE SERVICES FURNISHED UNDER THIS PLAN OF TREATMENT AND WHILE UNDER MY CARE    Physician's comments: ________________________________________________________________________________________________________________________________________________      Physician's Name: ___________________________________

## 2019-02-04 ENCOUNTER — CLINICAL SUPPORT (OUTPATIENT)
Dept: REHABILITATION | Facility: HOSPITAL | Age: 64
End: 2019-02-04
Attending: PHYSICIAN ASSISTANT
Payer: COMMERCIAL

## 2019-02-04 DIAGNOSIS — G89.29 CHRONIC LOW BACK PAIN, UNSPECIFIED BACK PAIN LATERALITY, WITH SCIATICA PRESENCE UNSPECIFIED: Primary | ICD-10-CM

## 2019-02-04 DIAGNOSIS — M54.5 CHRONIC LOW BACK PAIN, UNSPECIFIED BACK PAIN LATERALITY, WITH SCIATICA PRESENCE UNSPECIFIED: Primary | ICD-10-CM

## 2019-02-04 PROCEDURE — 97110 THERAPEUTIC EXERCISES: CPT | Mod: PN

## 2019-02-04 NOTE — PROGRESS NOTES
"Name: Tamica Cardona  [facility] Number: [id number]  Date of Treatment: 02/04/2019   Diagnosis:   Encounter Diagnosis   Name Primary?    Chronic low back pain, unspecified back pain laterality, with sciatica presence unspecified Yes       Time in: 1355  Time Out: 1450  Total Treatment Time: 55  Group Time: 0      Subjective:    Tamica reports improvement of symptoms.  Patient reports their pain to be 0-1/10 on a 0-10 scale with 0 being no pain and 10 being the worst pain imaginable.    Objective    Tamica received therapeutic exercises to develop strength, endurance, flexibility and core stabilization for 55 minutes including:     NuStep Lv3 10'  HS stretch supine 10" x10 R/L  IT band stretch supine with strap 3x20" R/L  Sciatic nerve glide supine x30 R/L  Supine Self massage R SI joint with tennis ball 5'  PPT x30  LTR x30 with Tball  DKTC x30 with Tball  SKTC 10" x10 R/L    Pt demo good understanding of the education provided. Tamica demonstrated good return demonstration of activities with cues for direction and proper form.    Assessment:     Pt will continue to benefit from skilled PT intervention. Medical Necessity is demonstrated by:  Pain limits function of effected part for some activities, Unable to participate fully in daily activities, Requires skilled supervision to complete and progress HEP and Weakness.    Plan:    Continue with established Plan of Care towards PT goals.   "

## 2019-02-21 ENCOUNTER — CLINICAL SUPPORT (OUTPATIENT)
Dept: REHABILITATION | Facility: HOSPITAL | Age: 64
End: 2019-02-21
Attending: PHYSICIAN ASSISTANT
Payer: COMMERCIAL

## 2019-02-21 DIAGNOSIS — M54.40 CHRONIC LOW BACK PAIN WITH SCIATICA, SCIATICA LATERALITY UNSPECIFIED, UNSPECIFIED BACK PAIN LATERALITY: ICD-10-CM

## 2019-02-21 DIAGNOSIS — G89.29 CHRONIC LOW BACK PAIN WITH SCIATICA, SCIATICA LATERALITY UNSPECIFIED, UNSPECIFIED BACK PAIN LATERALITY: ICD-10-CM

## 2019-02-21 PROCEDURE — 97110 THERAPEUTIC EXERCISES: CPT | Mod: PN

## 2019-02-21 NOTE — PROGRESS NOTES
Name: Tamica Cardona  [facility] Number: [id number]  Date of Treatment: 02/21/2019   Diagnosis:   Encounter Diagnosis   Name Primary?    Chronic low back pain with sciatica, sciatica laterality unspecified, unspecified back pain laterality        Time in: 1355  Time Out: 1501  Total Treatment Time: 66        Subjective:    Tamica reports she has pain when she sits for long periods, stands up and when bending over.  Pt states she has been trying to do the exercises every other day because she had to cancel a few appointments   Patient reports their pain to be 2/10 on a 0-10 scale with 0 being no pain and 10 being the worst pain imaginable.    Objective      Patient received individual therapy to increase strength, endurance, ROM, flexibility, posture and core stabilization with 0 patients with activities as follows:     Tamica received therapeutic exercises to develop strength, endurance, ROM and flexibility for 56 minutes including:     nustep x 10 min L-3  GSS 3 x 30 sec  HSS seated 3 x 30 sec  Piriformis stretch 3 x 30 sec  PPT x 30 reps  Bridging too painful  Ball squeezes 3 x 10 reps  Hip abd RTB 3 x 10 reps  LTR x 30 reps  DKTC x 30 reps  SKTC 10 x 10 sec  IT band stretch 3 x 30 sec with strap supine      Tamica received the following manual therapy techniques: Soft tissue Mobilization were applied to the: low back/R SI area for 10 minutes.     Pt demo good understanding of the education provided. Tamica demonstrated good return demonstration of activities.     Assessment:   Pt reports decreased pain and stiffness following manual PT.    Pt will continue to benefit from skilled PT intervention. Medical Necessity is demonstrated by:  Pain limits function of effected part for some activities, Unable to participate fully in daily activities, Requires skilled supervision to complete and progress HEP and Weakness.    Patient is making good progress towards established goals.          Plan:  Continue with established  Plan of Care towards PT goals.

## 2019-07-16 ENCOUNTER — DOCUMENTATION ONLY (OUTPATIENT)
Dept: FAMILY MEDICINE | Facility: CLINIC | Age: 64
End: 2019-07-16

## 2019-07-16 ENCOUNTER — OFFICE VISIT (OUTPATIENT)
Dept: FAMILY MEDICINE | Facility: CLINIC | Age: 64
End: 2019-07-16
Attending: FAMILY MEDICINE
Payer: COMMERCIAL

## 2019-07-16 VITALS
DIASTOLIC BLOOD PRESSURE: 76 MMHG | HEIGHT: 66 IN | TEMPERATURE: 99 F | HEART RATE: 79 BPM | SYSTOLIC BLOOD PRESSURE: 128 MMHG | RESPIRATION RATE: 12 BRPM | OXYGEN SATURATION: 97 % | BODY MASS INDEX: 22.85 KG/M2 | WEIGHT: 142.19 LBS

## 2019-07-16 DIAGNOSIS — J01.30 SUBACUTE SPHENOIDAL SINUSITIS: Primary | ICD-10-CM

## 2019-07-16 PROCEDURE — 99999 PR PBB SHADOW E&M-EST. PATIENT-LVL III: CPT | Mod: PBBFAC,,, | Performed by: FAMILY MEDICINE

## 2019-07-16 PROCEDURE — 99999 PR PBB SHADOW E&M-EST. PATIENT-LVL III: ICD-10-PCS | Mod: PBBFAC,,, | Performed by: FAMILY MEDICINE

## 2019-07-16 PROCEDURE — 3008F PR BODY MASS INDEX (BMI) DOCUMENTED: ICD-10-PCS | Mod: CPTII,S$GLB,, | Performed by: FAMILY MEDICINE

## 2019-07-16 PROCEDURE — 99213 PR OFFICE/OUTPT VISIT, EST, LEVL III, 20-29 MIN: ICD-10-PCS | Mod: S$GLB,,, | Performed by: FAMILY MEDICINE

## 2019-07-16 PROCEDURE — 99213 OFFICE O/P EST LOW 20 MIN: CPT | Mod: S$GLB,,, | Performed by: FAMILY MEDICINE

## 2019-07-16 PROCEDURE — 3008F BODY MASS INDEX DOCD: CPT | Mod: CPTII,S$GLB,, | Performed by: FAMILY MEDICINE

## 2019-07-16 RX ORDER — PREDNISONE 20 MG/1
40 TABLET ORAL DAILY
Qty: 6 TABLET | Refills: 0 | Status: SHIPPED | OUTPATIENT
Start: 2019-07-16 | End: 2019-07-19

## 2019-07-16 RX ORDER — AZITHROMYCIN 250 MG/1
TABLET, FILM COATED ORAL
Qty: 6 TABLET | Refills: 0 | Status: SHIPPED | OUTPATIENT
Start: 2019-07-16 | End: 2019-07-21

## 2019-07-16 RX ORDER — GUAIFENESIN 1200 MG/1
2 TABLET, EXTENDED RELEASE ORAL DAILY
COMMUNITY
End: 2019-09-18

## 2019-07-16 NOTE — PROGRESS NOTES
Pre-Visit Chart Review  For Appointment Scheduled on 7-16-19    There are no preventive care reminders to display for this patient.

## 2019-07-16 NOTE — PROGRESS NOTES
Subjective:       Patient ID: Tamica Cardona is a 63 y.o. female.    Chief Complaint: Cough    63-year-old female, former patient of Dr. Nance, presents with a 3+ week history of a productive cough.  Cough occurs throughout the day but is worse when she lies down at night where she gets profuse sputum production.  She has had some mild facial pain and a bitemporal headache that is very different from the chronic headaches described in her history.  She has no fever or chills and no night sweats but she has had some unusual fatigue.  She has no pleurisy or other chest pain. Just recently within the last two days she has developed a sore throat and a little hoarseness the thinks that is a 2nd issue.  She does not recall any exposure to illness prior to her cough development.    Past Medical History:  No date: Allergy  11/2003: Breast cancer      Comment:  right  2003: Interstitial cystitis  No date: Urinary tract infection    Past Surgical History:  2006: BREAST BIOPSY; Left      Comment:  benign  11/25/2014: COLONOSCOPY; N/A      Comment:  Performed by Jerson Cerda MD at [facility] ENDO  2003: CYSTOSCOPY  January 2015: EYE SURGERY  No date: HYSTERECTOMY      Comment:  TVH, ovaries intact. Uterine prolapse  1992: KNEE ARTHROSCOPY W/ MENISCECTOMY      Comment:  LT  2006: left breast biopsy      Comment:  benign  12/2003: MASTECTOMY, RADICAL      Comment:  RT    Current Outpatient Medications on File Prior to Visit:  acetaminophen (TYLENOL EXTRA STRENGTH) 500 MG tablet, Take 750 mg by mouth every 6 (six) hours as needed for Pain., Disp: , Rfl:   aspirin (ECOTRIN) 325 MG EC tablet, Take 650 mg by mouth every 6 (six) hours as needed for Pain., Disp: , Rfl:   calcium carbonate (OS-DESTINY) 600 mg (1,500 mg) Tab, Take 600 mg by mouth 2 (two) times daily with meals., Disp: , Rfl:   escitalopram oxalate (LEXAPRO) 10 MG tablet, Take 1 tablet (10 mg total) by mouth once daily., Disp: 90 tablet, Rfl: 3  ferrous gluconate  (FERGON) 325 MG tablet, Take 45 mg by mouth daily with breakfast. , Disp: , Rfl:   FEXOFENADINE HCL (ALLEGRA) 60 mg Cap, Twice a day  PRN, Disp: , Rfl:   guaiFENesin (MUCINEX) 1,200 mg Ta12, Take 2 tablets by mouth once daily., Disp: , Rfl:   meclizine (ANTIVERT) 25 mg tablet, Take 1 tablet (25 mg total) by mouth 3 (three) times daily as needed., Disp: 30 tablet, Rfl: 1  phenazopyridine (PYRIDIUM) 200 MG tablet, Take 1 tablet (200 mg total) by mouth every 6 (six) hours as needed., Disp: 30 tablet, Rfl: 2  triamcinolone acetonide 0.1% (KENALOG) 0.1 % ointment, Apply topically 3 (three) times daily. (Patient taking differently: Apply topically as needed. ), Disp: 80 g, Rfl: 2    No current facility-administered medications on file prior to visit.         Review of Systems   Constitutional: Negative for chills and fever.   HENT: Positive for congestion, postnasal drip, rhinorrhea, sinus pressure, sore throat and voice change. Negative for sinus pain.    Respiratory: Positive for cough. Negative for chest tightness, shortness of breath and wheezing.    Cardiovascular: Negative for chest pain and palpitations.       Objective:      Physical Exam   Constitutional: She appears well-developed and well-nourished. No distress.   Good blood pressure control  Normal weight with a BMI of 23.3  Mild hoarseness   HENT:   Head: Normocephalic and atraumatic.   Right Ear: Tympanic membrane, external ear and ear canal normal. Decreased hearing is noted.   Left Ear: Tympanic membrane, external ear and ear canal normal. Decreased hearing is noted.   Nose: Mucosal edema and rhinorrhea present. Right sinus exhibits no maxillary sinus tenderness and no frontal sinus tenderness. Left sinus exhibits no maxillary sinus tenderness and no frontal sinus tenderness.   Mouth/Throat: Oropharynx is clear and moist. No oropharyngeal exudate, posterior oropharyngeal edema, posterior oropharyngeal erythema or tonsillar abscesses.   Bilateral hearing  aids   Eyes: Pupils are equal, round, and reactive to light. EOM are normal. No scleral icterus.   Neck: Normal range of motion. Neck supple. No JVD present. No tracheal deviation present. No thyromegaly present.   Cardiovascular: Normal rate, regular rhythm and normal heart sounds. Exam reveals no gallop and no friction rub.   No murmur heard.  Pulmonary/Chest: Breath sounds normal. No tachypnea and no bradypnea. No respiratory distress. She has no decreased breath sounds. She has no wheezes. She has no rhonchi. She has no rales. Chest wall is not dull to percussion. She exhibits no tenderness, no crepitus and no retraction.   Lymphadenopathy:     She has no cervical adenopathy.   Skin: She is not diaphoretic.   Nursing note and vitals reviewed.      Assessment:       1. Subacute sphenoidal sinusitis        Plan:       1. Subacute sphenoidal sinusitis  Cough appears to be secondary to postnasal drainage from above, does not clinically appear to have any pneumonia but I am a little concerned about her fatigue.  If she does not respond considered getting a chest x-ray but clinical index of suspicion for pneumonia is very low based on examination  - azithromycin (Z-DEEPIKA) 250 MG tablet; Take 2 tablets by mouth on day 1; Take 1 tablet by mouth on days 2-5  Dispense: 6 tablet; Refill: 0  - predniSONE (DELTASONE) 20 MG tablet; Take 2 tablets (40 mg total) by mouth once daily. for 3 days  Dispense: 6 tablet; Refill: 0

## 2019-07-19 ENCOUNTER — PATIENT MESSAGE (OUTPATIENT)
Dept: FAMILY MEDICINE | Facility: CLINIC | Age: 64
End: 2019-07-19

## 2019-07-19 ENCOUNTER — HOSPITAL ENCOUNTER (OUTPATIENT)
Dept: RADIOLOGY | Facility: CLINIC | Age: 64
Discharge: HOME | End: 2019-07-19
Attending: FAMILY MEDICINE
Payer: COMMERCIAL

## 2019-07-19 DIAGNOSIS — J18.9 PNEUMONIA OF RIGHT LOWER LOBE DUE TO INFECTIOUS ORGANISM: Primary | ICD-10-CM

## 2019-07-19 DIAGNOSIS — R05.9 COUGH: Primary | ICD-10-CM

## 2019-07-19 DIAGNOSIS — R05.9 COUGH: ICD-10-CM

## 2019-07-19 PROCEDURE — 71046 XR CHEST PA AND LATERAL: ICD-10-PCS | Mod: 26,,, | Performed by: RADIOLOGY

## 2019-07-19 PROCEDURE — 71046 X-RAY EXAM CHEST 2 VIEWS: CPT | Mod: TC,FY,PO

## 2019-07-19 PROCEDURE — 71046 X-RAY EXAM CHEST 2 VIEWS: CPT | Mod: 26,,, | Performed by: RADIOLOGY

## 2019-07-19 RX ORDER — PROMETHAZINE HYDROCHLORIDE AND CODEINE PHOSPHATE 6.25; 1 MG/5ML; MG/5ML
5 SOLUTION ORAL EVERY 4 HOURS PRN
Qty: 240 ML | Refills: 0 | Status: SHIPPED | OUTPATIENT
Start: 2019-07-19 | End: 2019-07-29

## 2019-07-19 RX ORDER — LEVOFLOXACIN 750 MG/1
750 TABLET ORAL DAILY
Qty: 5 TABLET | Refills: 0 | Status: SHIPPED | OUTPATIENT
Start: 2019-07-19 | End: 2019-07-24

## 2019-07-19 RX ORDER — BENZONATATE 100 MG/1
100 CAPSULE ORAL 3 TIMES DAILY PRN
Qty: 60 CAPSULE | Refills: 1 | Status: SHIPPED | OUTPATIENT
Start: 2019-07-19 | End: 2019-07-29

## 2019-07-19 NOTE — TELEPHONE ENCOUNTER
Sputum is white. Agrees to chest x-ray and Tessalon. X-ray order was placed and appointment was scheduled.   Please send Tessalon to [facility] on [address].

## 2019-07-19 NOTE — TELEPHONE ENCOUNTER
Patient reports that she is not taking Allegra at this time and is drinking extra fluids. Agrees to try flonase. Anything else you recommend since she is not taking allegra? States she is constantly coughing.

## 2019-07-19 NOTE — TELEPHONE ENCOUNTER
If she is still taking the Allegra it can actually dry the sputum in make it very sticky, it more or less on does with the Mucinex is trying to do.  Flonase can help and will not dry out or gum up the mucus.

## 2019-07-19 NOTE — TELEPHONE ENCOUNTER
Any fever, discolored sputum?    We could try using some Tessalon Perles to help with the cough and we can get a chest x-ray

## 2019-07-20 NOTE — TELEPHONE ENCOUNTER
cxr with mild right lower lobe pneumonia.  Called patient, still coughing with no relief from tessalon.  Added levaquin 750 daily for five days and sent in phenergan with codeine for cough-reported allergy was not an allergy and removed from [facility].  rx sent to  [facility] [address].    Needs follow up cxr in two weeks.  Order in

## 2019-08-07 ENCOUNTER — HOSPITAL ENCOUNTER (OUTPATIENT)
Dept: RADIOLOGY | Facility: CLINIC | Age: 64
Discharge: HOME | End: 2019-08-07
Attending: FAMILY MEDICINE
Payer: COMMERCIAL

## 2019-08-07 DIAGNOSIS — J18.9 PNEUMONIA OF RIGHT LOWER LOBE DUE TO INFECTIOUS ORGANISM: ICD-10-CM

## 2019-08-07 PROCEDURE — 71046 XR CHEST PA AND LATERAL: ICD-10-PCS | Mod: 26,,, | Performed by: RADIOLOGY

## 2019-08-07 PROCEDURE — 71046 X-RAY EXAM CHEST 2 VIEWS: CPT | Mod: 26,,, | Performed by: RADIOLOGY

## 2019-08-07 PROCEDURE — 71046 X-RAY EXAM CHEST 2 VIEWS: CPT | Mod: TC,FY,PO

## 2019-09-18 ENCOUNTER — OFFICE VISIT (OUTPATIENT)
Dept: DERMATOLOGY | Facility: CLINIC | Age: 64
End: 2019-09-18
Payer: COMMERCIAL

## 2019-09-18 VITALS — HEIGHT: 66 IN | BODY MASS INDEX: 22.85 KG/M2 | WEIGHT: 142.19 LBS

## 2019-09-18 DIAGNOSIS — D18.00 ANGIOMA: ICD-10-CM

## 2019-09-18 DIAGNOSIS — L82.1 SEBORRHEIC KERATOSES: Primary | ICD-10-CM

## 2019-09-18 DIAGNOSIS — L40.0 PSORIASIS VULGARIS: ICD-10-CM

## 2019-09-18 DIAGNOSIS — Z12.83 SKIN CANCER SCREENING: ICD-10-CM

## 2019-09-18 PROCEDURE — 99203 PR OFFICE/OUTPT VISIT, NEW, LEVL III, 30-44 MIN: ICD-10-PCS | Mod: S$GLB,,, | Performed by: DERMATOLOGY

## 2019-09-18 PROCEDURE — 99999 PR PBB SHADOW E&M-EST. PATIENT-LVL III: CPT | Mod: PBBFAC,,, | Performed by: DERMATOLOGY

## 2019-09-18 PROCEDURE — 99203 OFFICE O/P NEW LOW 30 MIN: CPT | Mod: S$GLB,,, | Performed by: DERMATOLOGY

## 2019-09-18 PROCEDURE — 3008F BODY MASS INDEX DOCD: CPT | Mod: CPTII,S$GLB,, | Performed by: DERMATOLOGY

## 2019-09-18 PROCEDURE — 3008F PR BODY MASS INDEX (BMI) DOCUMENTED: ICD-10-PCS | Mod: CPTII,S$GLB,, | Performed by: DERMATOLOGY

## 2019-09-18 PROCEDURE — 99999 PR PBB SHADOW E&M-EST. PATIENT-LVL III: ICD-10-PCS | Mod: PBBFAC,,, | Performed by: DERMATOLOGY

## 2019-09-18 RX ORDER — MOMETASONE FUROATE 1 MG/G
OINTMENT TOPICAL
Qty: 45 G | Refills: 6 | Status: SHIPPED | OUTPATIENT
Start: 2019-09-18 | End: 2022-07-26 | Stop reason: ALTCHOICE

## 2019-09-18 NOTE — PROGRESS NOTES
Subjective:       Patient ID:  Tamica Cardona is a 63 y.o. female who presents for   Chief Complaint   Patient presents with    Spot     R arm, face, abdomen and hand, several months, no tx     New Pt.  Previously seen by [facility]  No Phx of NMSC, +Fhx of NMSC    Pt is here today for two spots that have grown a little larger over the years on the back of her R arm and on her abdomen. Pt is also here for a spot on her face that is raised and non-pigmented, for about 9 months and it is itchy.     She also has scaling on her R hand. Many years, 8 at least. Used to be on elbows and shins in past, early 20s    History BRCA, rash improved significantly after chemo  Denies rash on feet  Using vaseline to hand , gloves at night. No improvement with triamcinolone  Given Rx spray in past, not covered by insurance but was helpful.     Review of Systems   Constitutional: Negative for fever, chills and fatigue.   Skin: Negative for daily sunscreen use, activity-related sunscreen use and wears hat.   Hematologic/Lymphatic: Does not bruise/bleed easily.        Objective:    Physical Exam   Constitutional: She appears well-developed and well-nourished. No distress.   HENT:   Mouth/Throat: Lips normal.    Eyes: No conjunctival no injection.   Cardiovascular: There is no dependent edema.     Neurological: She is alert and oriented to person, place, and time. She is not disoriented.   Psychiatric: She has a normal mood and affect.   Skin:   Areas Examined (abnormalities noted in diagram):   Head / Face Inspection Performed  Neck Inspection Performed  Chest / Axilla Inspection Performed  Abdomen Inspection Performed  Back Inspection Performed  RUE Inspected  LUE Inspection Performed  Nails and Digits Inspection Performed                       Diagram Legend     Erythematous scaling macule/papule c/w actinic keratosis       Vascular papule c/w angioma      Pigmented verrucoid papule/plaque c/w seborrheic keratosis      Yellow  umbilicated papule c/w sebaceous hyperplasia      Irregularly shaped tan macule c/w lentigo     1-2 mm smooth white papules consistent with Milia      Movable subcutaneous cyst with punctum c/w epidermal inclusion cyst      Subcutaneous movable cyst c/w pilar cyst      Firm pink to brown papule c/w dermatofibroma      Pedunculated fleshy papule(s) c/w skin tag(s)      Evenly pigmented macule c/w junctional nevus     Mildly variegated pigmented, slightly irregular-bordered macule c/w mildly atypical nevus      Flesh colored to evenly pigmented papule c/w intradermal nevus       Pink pearly papule/plaque c/w basal cell carcinoma      Erythematous hyperkeratotic cursted plaque c/w SCC      Surgical scar with no sign of skin cancer recurrence      Open and closed comedones      Inflammatory papules and pustules      Verrucoid papule consistent consistent with wart     Erythematous eczematous patches and plaques     Dystrophic onycholytic nail with subungual debris c/w onychomycosis     Umbilicated papule    Erythematous-base heme-crusted tan verrucoid plaque consistent with inflamed seborrheic keratosis     Erythematous Silvery Scaling Plaque c/w Psoriasis     See annotation      Assessment / Plan:        Seborrheic keratoses, face/trunk  These are benign inherited growths without a malignant potential. Reassurance given to patient. No treatment is necessary.       Psoriasis vulgaris  vaseline as moisturizer  Dry skin precautions  -     mometasone (ELOCON) 0.1 % ointment; aaa bid x 1-2 weeks then prn  Dispense: 45 g; Refill: 6    Skin cancer screening  Upper body skin examination performed today including at least 6 points as noted in physical examination. No lesions suspicious for malignancy noted.        Angioma  Trunk  This is a benign vascular lesion. Reassurance given. No treatment required.              Follow up if symptoms worsen or fail to improve.

## 2019-10-28 ENCOUNTER — OFFICE VISIT (OUTPATIENT)
Dept: FAMILY MEDICINE | Facility: CLINIC | Age: 64
End: 2019-10-28
Payer: COMMERCIAL

## 2019-10-28 VITALS
RESPIRATION RATE: 20 BRPM | OXYGEN SATURATION: 94 % | DIASTOLIC BLOOD PRESSURE: 64 MMHG | SYSTOLIC BLOOD PRESSURE: 138 MMHG | HEIGHT: 66 IN | HEART RATE: 72 BPM | WEIGHT: 145.06 LBS | TEMPERATURE: 98 F | BODY MASS INDEX: 23.31 KG/M2

## 2019-10-28 DIAGNOSIS — Z00.00 ANNUAL PHYSICAL EXAM: Primary | ICD-10-CM

## 2019-10-28 DIAGNOSIS — Z85.3 HISTORY OF BREAST CANCER: ICD-10-CM

## 2019-10-28 DIAGNOSIS — Z12.39 BREAST CANCER SCREENING: ICD-10-CM

## 2019-10-28 DIAGNOSIS — E78.5 HYPERLIPIDEMIA, UNSPECIFIED HYPERLIPIDEMIA TYPE: ICD-10-CM

## 2019-10-28 DIAGNOSIS — Z00.00 LABORATORY EXAM ORDERED AS PART OF ROUTINE GENERAL MEDICAL EXAMINATION: ICD-10-CM

## 2019-10-28 DIAGNOSIS — D70.9 NEUTROPENIA, UNSPECIFIED TYPE: ICD-10-CM

## 2019-10-28 DIAGNOSIS — F32.A DEPRESSION, UNSPECIFIED DEPRESSION TYPE: ICD-10-CM

## 2019-10-28 DIAGNOSIS — D51.9 ANEMIA DUE TO VITAMIN B12 DEFICIENCY, UNSPECIFIED B12 DEFICIENCY TYPE: ICD-10-CM

## 2019-10-28 DIAGNOSIS — E55.9 VITAMIN D DEFICIENCY: ICD-10-CM

## 2019-10-28 PROCEDURE — 90686 IIV4 VACC NO PRSV 0.5 ML IM: CPT | Mod: S$GLB,,, | Performed by: NURSE PRACTITIONER

## 2019-10-28 PROCEDURE — 99396 PREV VISIT EST AGE 40-64: CPT | Mod: 25,S$GLB,, | Performed by: NURSE PRACTITIONER

## 2019-10-28 PROCEDURE — 90471 IMMUNIZATION ADMIN: CPT | Mod: S$GLB,,, | Performed by: NURSE PRACTITIONER

## 2019-10-28 PROCEDURE — 99396 PR PREVENTIVE VISIT,EST,40-64: ICD-10-PCS | Mod: 25,S$GLB,, | Performed by: NURSE PRACTITIONER

## 2019-10-28 PROCEDURE — 90686 FLU VACCINE (QUAD) GREATER THAN OR EQUAL TO 3YO PRESERVATIVE FREE IM: ICD-10-PCS | Mod: S$GLB,,, | Performed by: NURSE PRACTITIONER

## 2019-10-28 PROCEDURE — 90471 FLU VACCINE (QUAD) GREATER THAN OR EQUAL TO 3YO PRESERVATIVE FREE IM: ICD-10-PCS | Mod: S$GLB,,, | Performed by: NURSE PRACTITIONER

## 2019-10-28 RX ORDER — ESCITALOPRAM OXALATE 10 MG/1
10 TABLET ORAL DAILY
Qty: 90 TABLET | Refills: 3 | OUTPATIENT
Start: 2019-10-28 | End: 2019-10-28 | Stop reason: SDUPTHER

## 2019-10-28 RX ORDER — ESCITALOPRAM OXALATE 10 MG/1
10 TABLET ORAL DAILY
Qty: 90 TABLET | Refills: 3 | Status: SHIPPED | OUTPATIENT
Start: 2019-10-28 | End: 2021-04-26

## 2019-10-28 RX ORDER — CHOLECALCIFEROL (VITAMIN D3) 25 MCG
1000 TABLET ORAL DAILY
COMMUNITY

## 2019-10-28 NOTE — PROGRESS NOTES
Answers for HPI/ROS submitted by the patient on 10/27/2019   activity change: No  unexpected weight change: No  neck pain: No  hearing loss: Yes  rhinorrhea: No  trouble swallowing: No  eye discharge: No  visual disturbance: No  chest tightness: No  wheezing: No  chest pain: No  palpitations: No  blood in stool: No  constipation: No  vomiting: No  diarrhea: No  polydipsia: No  polyuria: No  difficulty urinating: No  hematuria: No  menstrual problem: No  dysuria: No  joint swelling: No  arthralgias: Yes  headaches: No  weakness: No  confusion: No  dysphoric mood: No    Subjective:       Patient ID: Tamica Cardona is a 63 y.o. female.    Chief Complaint: Establish Care    HPI New patient, here to establish care. Her PMH, PSH is reviewed. She has history of right sided breast cancer and underwent mastectomy about 16 years ago. She has had abnormal mammogram on the left, but biopsy was negative. She has yearly mammograms and is due at this time. She is due for routine labs. She states she follows up yearly with her Ophthalmologist for her vision issues. She states she has been doing well. She has some fatigue off and on. She has history of iron deficiency anemia. She takes iron replacement daily. She feels that her depression is well controlled with her current dose of lexapro. She has history of elevated cholesterol. She eats well and exercises at least 3 days a week. She has history of bening neutropenia. She is taking OsCal for osteopenia. She denies any specific concerns today. See ROS.     The following portion of the patients history was reviewed and updated as appropriate: allergies, current medications, past medical and surgical history. Past social history and problem list reviewed. Family PMH and Past social history reviewed. Tobacco, Illicit drug use reviewed.      Review of patient's allergies indicates:   Allergen Reactions    Erythromycin base      Other reaction(s): Vomiting  Other reaction(s):  Stomach upset  Other reaction(s): Nausea    Penicillins      Other reaction(s): convulsions    Thimerosal          Current Outpatient Medications:     acetaminophen (TYLENOL EXTRA STRENGTH) 500 MG tablet, Take 750 mg by mouth every 6 (six) hours as needed for Pain., Disp: , Rfl:     aspirin (ECOTRIN) 325 MG EC tablet, Take 650 mg by mouth every 6 (six) hours as needed for Pain., Disp: , Rfl:     calcium carbonate (OS-DESTINY) 600 mg (1,500 mg) Tab, Take 600 mg by mouth 2 (two) times daily with meals., Disp: , Rfl:     escitalopram oxalate (LEXAPRO) 10 MG tablet, Take 1 tablet (10 mg total) by mouth once daily., Disp: 90 tablet, Rfl: 3    ferrous gluconate (FERGON) 325 MG tablet, Take 45 mg by mouth daily with breakfast. , Disp: , Rfl:     FEXOFENADINE HCL (ALLEGRA) 60 mg Cap, Twice a day  PRN, Disp: , Rfl:     meclizine (ANTIVERT) 25 mg tablet, Take 1 tablet (25 mg total) by mouth 3 (three) times daily as needed., Disp: 30 tablet, Rfl: 1    mometasone (ELOCON) 0.1 % ointment, aaa bid x 1-2 weeks then prn, Disp: 45 g, Rfl: 6    phenazopyridine (PYRIDIUM) 200 MG tablet, Take 1 tablet (200 mg total) by mouth every 6 (six) hours as needed., Disp: 30 tablet, Rfl: 2    vitamin D (VITAMIN D3) 1000 units Tab, Take 1,000 Units by mouth once daily., Disp: , Rfl:     Past Medical History:   Diagnosis Date    Allergy     Breast cancer 11/2003    right    Interstitial cystitis 2003    Urinary tract infection        Past Surgical History:   Procedure Laterality Date    BREAST BIOPSY Left 2006    benign    CYSTOSCOPY  2003    EYE SURGERY  January 2015    HYSTERECTOMY      TVH, ovaries intact. Uterine prolapse    KNEE ARTHROSCOPY W/ MENISCECTOMY  1992    LT    left breast biopsy  2006    benign    MASTECTOMY, RADICAL  12/2003    RT       Social History     Socioeconomic History    Marital status:      Spouse name: Not on file    Number of children: Not on file    Years of education: Not on file     Highest education level: Not on file   Occupational History    Not on file   Social Needs    Financial resource strain: Not hard at all    Food insecurity:     Worry: Never true     Inability: Never true    Transportation needs:     Medical: No     Non-medical: No   Tobacco Use    Smoking status: Never Smoker    Smokeless tobacco: Never Used   Substance and Sexual Activity    Alcohol use: No     Frequency: 2-4 times a month     Drinks per session: 1 or 2     Binge frequency: Never    Drug use: No    Sexual activity: Not on file   Lifestyle    Physical activity:     Days per week: 3 days     Minutes per session: 50 min    Stress: Not at all   Relationships    Social connections:     Talks on phone: More than three times a week     Gets together: Twice a week     Attends Mu-ism service: Not on file     Active member of club or organization: No     Attends meetings of clubs or organizations: Never     Relationship status:    Other Topics Concern    Are you pregnant or think you may be? Not Asked    Breast-feeding Not Asked   Social History Narrative    Not on file     Review of Systems   Constitutional: Negative for activity change, fatigue, fever and unexpected weight change.   HENT: Positive for hearing loss. Negative for congestion, rhinorrhea, sneezing and trouble swallowing.    Eyes: Negative for discharge and visual disturbance.   Respiratory: Negative for cough, chest tightness, shortness of breath and wheezing.    Cardiovascular: Negative for chest pain, palpitations and leg swelling.   Gastrointestinal: Negative for abdominal pain, blood in stool, constipation, diarrhea, nausea and vomiting.   Endocrine: Negative for polydipsia and polyuria.   Genitourinary: Negative for difficulty urinating, dysuria and hematuria.   Musculoskeletal: Positive for arthralgias. Negative for back pain, gait problem, joint swelling and neck pain.   Skin: Negative for rash.   Neurological: Negative for  "weakness and headaches.   Hematological: Negative for adenopathy. Does not bruise/bleed easily.   Psychiatric/Behavioral: Negative for confusion, dysphoric mood, self-injury, sleep disturbance and suicidal ideas. The patient is not nervous/anxious.        Objective:      /64   Pulse 72   Temp 98.4 °F (36.9 °C) (Oral)   Resp 20   Ht 5' 5.5" (1.664 m)   Wt 65.8 kg (145 lb 1 oz)   SpO2 (!) 94%   BMI 23.77 kg/m²      Physical Exam   Constitutional: She is oriented to person, place, and time. She appears well-developed and well-nourished. No distress.   HENT:   Head: Normocephalic.   Eyes: Pupils are equal, round, and reactive to light. Conjunctivae and EOM are normal.   Neck: Trachea normal and normal range of motion. Neck supple. No JVD present. No tracheal tenderness present. Carotid bruit is not present. No neck rigidity. No tracheal deviation, no edema and normal range of motion present. No thyromegaly present.   Cardiovascular: Normal rate, regular rhythm and normal heart sounds. Exam reveals no gallop.   No murmur heard.  Pulses:       Carotid pulses are 2+ on the right side, and 2+ on the left side.       Radial pulses are 2+ on the right side, and 2+ on the left side.   Pulmonary/Chest: Breath sounds normal. No stridor. No respiratory distress. She has no wheezes. She has no rhonchi. She has no rales.   Abdominal: Soft. Normal appearance and bowel sounds are normal. She exhibits no mass. There is no splenomegaly or hepatomegaly. There is no tenderness. There is no rebound.   Musculoskeletal: Normal range of motion.   Gait and coordination normal. Upper and lower extremity strength normal.  are equal, strong   Lymphadenopathy:     She has no cervical adenopathy.   Neurological: She is alert and oriented to person, place, and time. She has normal strength.   Skin: Skin is warm and dry. Capillary refill takes less than 2 seconds. No lesion and no rash noted.   Psychiatric: She has a normal mood " and affect. Her speech is normal and behavior is normal.       Assessment:       1. Annual physical exam    2. Hyperlipidemia, unspecified hyperlipidemia type    3. Anemia due to vitamin B12 deficiency, unspecified B12 deficiency type    4. Vitamin D deficiency    5. Depression, unspecified depression type    6. Neutropenia, unspecified type    7. Breast cancer screening    8. Laboratory exam ordered as part of routine general medical examination    9. History of breast cancer        Plan:       Annual physical exam    Hyperlipidemia, unspecified hyperlipidemia type: follow low fat, low carb diet. Exercise. Due for labs.  -     Lipid panel; Future; Expected date: 10/28/2019    Anemia due to vitamin B12 deficiency, unspecified B12 deficiency type: due for labs for evaluation. Continue iron replacement.  -     Iron and TIBC; Future; Expected date: 10/28/2019  -     Vitamin B12; Future; Expected date: 10/28/2019  -     Ferritin; Future; Expected date: 10/28/2019  -     Folate; Future; Expected date: 10/28/2019    Vitamin D deficiency: continue vitamin D.   -     Vitamin D; Future; Expected date: 10/28/2019    Depression, unspecified depression type: good control on current medication. Continue current dose.    Neutropenia, unspecified type: due for labs for evaluation.     Breast cancer screening  -     Mammo Digital Screening Bilat; Future; Expected date: 10/28/2019    Laboratory exam ordered as part of routine general medical examination  -     CBC auto differential; Future; Expected date: 10/28/2019  -     Comprehensive metabolic panel; Future; Expected date: 10/28/2019    History of breast cancer: right sided mastectomy. Due for mammogram.    Other orders  -        Influenza - Quadrivalent (PF)  -     escitalopram oxalate (LEXAPRO) 10 MG tablet; Take 1 tablet (10 mg total) by mouth once daily.  Dispense: 90 tablet; Refill: 3       Continue current medication  Take medications only as prescribed  Healthy diet,  exercise  Adequate rest  Adequate hydration  Avoid allergens  Avoid excessive caffeine     follow up in 3 months, sooner if issues arise.

## 2019-10-29 ENCOUNTER — LAB VISIT (OUTPATIENT)
Dept: LAB | Facility: HOSPITAL | Age: 64
End: 2019-10-29
Attending: NURSE PRACTITIONER
Payer: COMMERCIAL

## 2019-10-29 ENCOUNTER — OFFICE VISIT (OUTPATIENT)
Dept: OPTOMETRY | Facility: CLINIC | Age: 64
End: 2019-10-29
Payer: COMMERCIAL

## 2019-10-29 DIAGNOSIS — E55.9 VITAMIN D DEFICIENCY: ICD-10-CM

## 2019-10-29 DIAGNOSIS — Z00.00 LABORATORY EXAM ORDERED AS PART OF ROUTINE GENERAL MEDICAL EXAMINATION: ICD-10-CM

## 2019-10-29 DIAGNOSIS — H25.13 NUCLEAR SCLEROSIS, BILATERAL: Primary | ICD-10-CM

## 2019-10-29 DIAGNOSIS — E78.5 HYPERLIPIDEMIA, UNSPECIFIED HYPERLIPIDEMIA TYPE: ICD-10-CM

## 2019-10-29 DIAGNOSIS — Z13.5 GLAUCOMA SCREENING: ICD-10-CM

## 2019-10-29 DIAGNOSIS — H31.003 CHORIORETINAL SCAR, BILATERAL: ICD-10-CM

## 2019-10-29 DIAGNOSIS — H52.203 MYOPIA WITH ASTIGMATISM AND PRESBYOPIA, BILATERAL: ICD-10-CM

## 2019-10-29 DIAGNOSIS — D51.9 ANEMIA DUE TO VITAMIN B12 DEFICIENCY, UNSPECIFIED B12 DEFICIENCY TYPE: ICD-10-CM

## 2019-10-29 DIAGNOSIS — H52.4 MYOPIA WITH ASTIGMATISM AND PRESBYOPIA, BILATERAL: ICD-10-CM

## 2019-10-29 DIAGNOSIS — H52.13 MYOPIA WITH ASTIGMATISM AND PRESBYOPIA, BILATERAL: ICD-10-CM

## 2019-10-29 LAB
25(OH)D3+25(OH)D2 SERPL-MCNC: 39 NG/ML (ref 30–96)
ALBUMIN SERPL BCP-MCNC: 3.7 G/DL (ref 3.5–5.2)
ALP SERPL-CCNC: 105 U/L (ref 55–135)
ALT SERPL W/O P-5'-P-CCNC: 55 U/L (ref 10–44)
ANION GAP SERPL CALC-SCNC: 7 MMOL/L (ref 8–16)
AST SERPL-CCNC: 38 U/L (ref 10–40)
BASOPHILS # BLD AUTO: 0.04 K/UL (ref 0–0.2)
BASOPHILS NFR BLD: 1.1 % (ref 0–1.9)
BILIRUB SERPL-MCNC: 1.1 MG/DL (ref 0.1–1)
BUN SERPL-MCNC: 9 MG/DL (ref 8–23)
CALCIUM SERPL-MCNC: 9.7 MG/DL (ref 8.7–10.5)
CHLORIDE SERPL-SCNC: 107 MMOL/L (ref 95–110)
CHOLEST SERPL-MCNC: 274 MG/DL (ref 120–199)
CHOLEST/HDLC SERPL: 4.6 {RATIO} (ref 2–5)
CO2 SERPL-SCNC: 30 MMOL/L (ref 23–29)
CREAT SERPL-MCNC: 0.9 MG/DL (ref 0.5–1.4)
DIFFERENTIAL METHOD: ABNORMAL
EOSINOPHIL # BLD AUTO: 0.1 K/UL (ref 0–0.5)
EOSINOPHIL NFR BLD: 2.5 % (ref 0–8)
ERYTHROCYTE [DISTWIDTH] IN BLOOD BY AUTOMATED COUNT: 13.3 % (ref 11.5–14.5)
EST. GFR  (AFRICAN AMERICAN): >60 ML/MIN/1.73 M^2
EST. GFR  (NON AFRICAN AMERICAN): >60 ML/MIN/1.73 M^2
FERRITIN SERPL-MCNC: 81 NG/ML (ref 20–300)
FOLATE SERPL-MCNC: 9.3 NG/ML (ref 4–24)
GLUCOSE SERPL-MCNC: 91 MG/DL (ref 70–110)
HCT VFR BLD AUTO: 44.4 % (ref 37–48.5)
HDLC SERPL-MCNC: 59 MG/DL (ref 40–75)
HDLC SERPL: 21.5 % (ref 20–50)
HGB BLD-MCNC: 14.4 G/DL (ref 12–16)
IMM GRANULOCYTES # BLD AUTO: 0.01 K/UL (ref 0–0.04)
IMM GRANULOCYTES NFR BLD AUTO: 0.3 % (ref 0–0.5)
IRON SERPL-MCNC: 98 UG/DL (ref 30–160)
LDLC SERPL CALC-MCNC: 185.2 MG/DL (ref 63–159)
LYMPHOCYTES # BLD AUTO: 0.9 K/UL (ref 1–4.8)
LYMPHOCYTES NFR BLD: 24.6 % (ref 18–48)
MCH RBC QN AUTO: 30.1 PG (ref 27–31)
MCHC RBC AUTO-ENTMCNC: 32.4 G/DL (ref 32–36)
MCV RBC AUTO: 93 FL (ref 82–98)
MONOCYTES # BLD AUTO: 0.3 K/UL (ref 0.3–1)
MONOCYTES NFR BLD: 7.9 % (ref 4–15)
NEUTROPHILS # BLD AUTO: 2.3 K/UL (ref 1.8–7.7)
NEUTROPHILS NFR BLD: 63.6 % (ref 38–73)
NONHDLC SERPL-MCNC: 215 MG/DL
NRBC BLD-RTO: 0 /100 WBC
PLATELET # BLD AUTO: 167 K/UL (ref 150–350)
PMV BLD AUTO: 11 FL (ref 9.2–12.9)
POTASSIUM SERPL-SCNC: 4.4 MMOL/L (ref 3.5–5.1)
PROT SERPL-MCNC: 7.1 G/DL (ref 6–8.4)
RBC # BLD AUTO: 4.78 M/UL (ref 4–5.4)
SATURATED IRON: 28 % (ref 20–50)
SODIUM SERPL-SCNC: 144 MMOL/L (ref 136–145)
TOTAL IRON BINDING CAPACITY: 351 UG/DL (ref 250–450)
TRANSFERRIN SERPL-MCNC: 237 MG/DL (ref 200–375)
TRIGL SERPL-MCNC: 149 MG/DL (ref 30–150)
VIT B12 SERPL-MCNC: 408 PG/ML (ref 210–950)
WBC # BLD AUTO: 3.54 K/UL (ref 3.9–12.7)

## 2019-10-29 PROCEDURE — 83540 ASSAY OF IRON: CPT

## 2019-10-29 PROCEDURE — 82746 ASSAY OF FOLIC ACID SERUM: CPT

## 2019-10-29 PROCEDURE — 80061 LIPID PANEL: CPT

## 2019-10-29 PROCEDURE — 82306 VITAMIN D 25 HYDROXY: CPT

## 2019-10-29 PROCEDURE — 92015 PR REFRACTION: ICD-10-PCS | Mod: S$GLB,,, | Performed by: OPTOMETRIST

## 2019-10-29 PROCEDURE — 85025 COMPLETE CBC W/AUTO DIFF WBC: CPT

## 2019-10-29 PROCEDURE — 80053 COMPREHEN METABOLIC PANEL: CPT

## 2019-10-29 PROCEDURE — 82607 VITAMIN B-12: CPT

## 2019-10-29 PROCEDURE — 92015 DETERMINE REFRACTIVE STATE: CPT | Mod: S$GLB,,, | Performed by: OPTOMETRIST

## 2019-10-29 PROCEDURE — 99999 PR PBB SHADOW E&M-EST. PATIENT-LVL III: CPT | Mod: PBBFAC,,, | Performed by: OPTOMETRIST

## 2019-10-29 PROCEDURE — 99999 PR PBB SHADOW E&M-EST. PATIENT-LVL III: ICD-10-PCS | Mod: PBBFAC,,, | Performed by: OPTOMETRIST

## 2019-10-29 PROCEDURE — 92014 PR EYE EXAM, EST PATIENT,COMPREHESV: ICD-10-PCS | Mod: S$GLB,,, | Performed by: OPTOMETRIST

## 2019-10-29 PROCEDURE — 36415 COLL VENOUS BLD VENIPUNCTURE: CPT | Mod: PO

## 2019-10-29 PROCEDURE — 92014 COMPRE OPH EXAM EST PT 1/>: CPT | Mod: S$GLB,,, | Performed by: OPTOMETRIST

## 2019-10-29 PROCEDURE — 82728 ASSAY OF FERRITIN: CPT

## 2019-10-29 NOTE — PROGRESS NOTES
HPI     Patient is here for annual eye exam    DLS 10/17/18    Patient is having problems focusing, having to blink a few times for   things to come clear  Patient denies HTN and diabetes   Patient denies any new floaters and FOL   Patient denies gtts   Hx of RD and Retinal holes     Last edited by Ronald Schilling on 10/29/2019  2:15 PM. (History)        ROS     Positive for: Eyes    Negative for: Constitutional, Gastrointestinal, Neurological, Skin,   Genitourinary, Musculoskeletal, HENT, Endocrine, Cardiovascular,   Respiratory, Psychiatric, Allergic/Imm, Heme/Lymph    Last edited by MICH Garcia, OD on 10/29/2019  2:28 PM. (History)        Assessment /Plan     For exam results, see Encounter Report.    Nuclear sclerosis, bilateral    Chorioretinal scar, bilateral    Glaucoma screening    Myopia with astigmatism and presbyopia, bilateral      1. Early changes, not vis sig for consult   2. S/p HST OU, w/ barrier laser repair---stable OU  RD precautions given, reviewed  3. Not suspect  4. Updated specs rx, gave copy, fill prn    Discussed and educated patient on current findings /plan.  RTC 1 year, prn if any changes / issues

## 2019-10-30 ENCOUNTER — PATIENT MESSAGE (OUTPATIENT)
Dept: FAMILY MEDICINE | Facility: CLINIC | Age: 64
End: 2019-10-30

## 2019-10-30 DIAGNOSIS — D70.9 NEUTROPENIA, UNSPECIFIED TYPE: ICD-10-CM

## 2019-12-02 ENCOUNTER — PATIENT MESSAGE (OUTPATIENT)
Dept: FAMILY MEDICINE | Facility: CLINIC | Age: 64
End: 2019-12-02

## 2019-12-02 ENCOUNTER — HOSPITAL ENCOUNTER (OUTPATIENT)
Dept: RADIOLOGY | Facility: HOSPITAL | Age: 64
Discharge: HOME | End: 2019-12-02
Attending: NURSE PRACTITIONER
Payer: COMMERCIAL

## 2019-12-02 DIAGNOSIS — Z12.31 BREAST CANCER SCREENING BY MAMMOGRAM: ICD-10-CM

## 2019-12-02 PROCEDURE — 77063 MAMMO DIGITAL SCREENING LEFT WITH TOMOSYNTHESIS_CAD: ICD-10-PCS | Mod: 26,,, | Performed by: RADIOLOGY

## 2019-12-02 PROCEDURE — 77067 SCR MAMMO BI INCL CAD: CPT | Mod: 26,,, | Performed by: RADIOLOGY

## 2019-12-02 PROCEDURE — 77063 BREAST TOMOSYNTHESIS BI: CPT | Mod: 26,,, | Performed by: RADIOLOGY

## 2019-12-02 PROCEDURE — 77067 SCR MAMMO BI INCL CAD: CPT | Mod: TC,PO

## 2019-12-02 PROCEDURE — 77067 MAMMO DIGITAL SCREENING LEFT WITH TOMOSYNTHESIS_CAD: ICD-10-PCS | Mod: 26,,, | Performed by: RADIOLOGY

## 2019-12-02 RX ORDER — MECLIZINE HYDROCHLORIDE 25 MG/1
25 TABLET ORAL 3 TIMES DAILY PRN
Qty: 30 TABLET | Refills: 1 | Status: SHIPPED | OUTPATIENT
Start: 2019-12-02 | End: 2019-12-06 | Stop reason: SDUPTHER

## 2019-12-02 NOTE — TELEPHONE ENCOUNTER
Patient also reports that she has been taking OTC mucinex and has a productive cough. Please see My chart message and advise.

## 2019-12-03 ENCOUNTER — PATIENT MESSAGE (OUTPATIENT)
Dept: FAMILY MEDICINE | Facility: CLINIC | Age: 64
End: 2019-12-03

## 2019-12-06 ENCOUNTER — OFFICE VISIT (OUTPATIENT)
Dept: FAMILY MEDICINE | Facility: CLINIC | Age: 64
End: 2019-12-06
Payer: COMMERCIAL

## 2019-12-06 VITALS
SYSTOLIC BLOOD PRESSURE: 118 MMHG | RESPIRATION RATE: 20 BRPM | HEIGHT: 66 IN | WEIGHT: 142.5 LBS | OXYGEN SATURATION: 97 % | HEART RATE: 72 BPM | DIASTOLIC BLOOD PRESSURE: 62 MMHG | BODY MASS INDEX: 22.9 KG/M2 | TEMPERATURE: 99 F

## 2019-12-06 DIAGNOSIS — J01.00 ACUTE MAXILLARY SINUSITIS, RECURRENCE NOT SPECIFIED: ICD-10-CM

## 2019-12-06 DIAGNOSIS — R42 DIZZINESS: ICD-10-CM

## 2019-12-06 DIAGNOSIS — R06.2 WHEEZING: ICD-10-CM

## 2019-12-06 DIAGNOSIS — R51.9 SINUS HEADACHE: ICD-10-CM

## 2019-12-06 DIAGNOSIS — J45.21 MILD INTERMITTENT REACTIVE AIRWAY DISEASE WITH ACUTE EXACERBATION: Primary | ICD-10-CM

## 2019-12-06 PROCEDURE — 99214 PR OFFICE/OUTPT VISIT, EST, LEVL IV, 30-39 MIN: ICD-10-PCS | Mod: 25,S$GLB,, | Performed by: NURSE PRACTITIONER

## 2019-12-06 PROCEDURE — 3008F PR BODY MASS INDEX (BMI) DOCUMENTED: ICD-10-PCS | Mod: CPTII,S$GLB,, | Performed by: NURSE PRACTITIONER

## 2019-12-06 PROCEDURE — 96372 THER/PROPH/DIAG INJ SC/IM: CPT | Mod: S$GLB,,, | Performed by: NURSE PRACTITIONER

## 2019-12-06 PROCEDURE — 3008F BODY MASS INDEX DOCD: CPT | Mod: CPTII,S$GLB,, | Performed by: NURSE PRACTITIONER

## 2019-12-06 PROCEDURE — 99214 OFFICE O/P EST MOD 30 MIN: CPT | Mod: 25,S$GLB,, | Performed by: NURSE PRACTITIONER

## 2019-12-06 PROCEDURE — 96372 PR INJECTION,THERAP/PROPH/DIAG2ST, IM OR SUBCUT: ICD-10-PCS | Mod: S$GLB,,, | Performed by: NURSE PRACTITIONER

## 2019-12-06 RX ORDER — DEXAMETHASONE SODIUM PHOSPHATE 4 MG/ML
8 INJECTION, SOLUTION INTRA-ARTICULAR; INTRALESIONAL; INTRAMUSCULAR; INTRAVENOUS; SOFT TISSUE ONCE
Status: COMPLETED | OUTPATIENT
Start: 2019-12-06 | End: 2019-12-06

## 2019-12-06 RX ORDER — LEVOFLOXACIN 500 MG/1
500 TABLET, FILM COATED ORAL DAILY
Qty: 10 TABLET | Refills: 0 | Status: SHIPPED | OUTPATIENT
Start: 2019-12-06 | End: 2019-12-16

## 2019-12-06 RX ORDER — MECLIZINE HYDROCHLORIDE 25 MG/1
25 TABLET ORAL 3 TIMES DAILY PRN
Qty: 30 TABLET | Refills: 1 | Status: SHIPPED | OUTPATIENT
Start: 2019-12-06

## 2019-12-06 RX ORDER — PREDNISONE 20 MG/1
TABLET ORAL
Qty: 10 TABLET | Refills: 0 | Status: SHIPPED | OUTPATIENT
Start: 2019-12-06 | End: 2020-10-05

## 2019-12-06 RX ORDER — GUAIFENESIN 1200 MG/1
TABLET, EXTENDED RELEASE ORAL
COMMUNITY
Start: 2019-11-29 | End: 2020-10-05

## 2019-12-06 RX ORDER — PSEUDOEPHEDRINE HYDROCHLORIDE 60 MG/1
60 TABLET ORAL DAILY PRN
COMMUNITY
Start: 2019-11-25 | End: 2021-10-19

## 2019-12-06 RX ADMIN — DEXAMETHASONE SODIUM PHOSPHATE 8 MG: 4 INJECTION, SOLUTION INTRA-ARTICULAR; INTRALESIONAL; INTRAMUSCULAR; INTRAVENOUS; SOFT TISSUE at 11:12

## 2019-12-06 NOTE — PROGRESS NOTES
Answers for HPI/ROS submitted by the patient on 12/5/2019   Cough  Chronicity: recurrent  Onset: 1 to 4 weeks ago  Progression since onset: gradually worsening  Frequency: every few minutes  Cough characteristics: productive of sputum  ear congestion: Yes  headaches: Yes  nasal congestion: Yes  wheezing: Yes  Aggravated by: lying down  asthma: No  bronchiectasis: No  bronchitis: Yes  COPD: No  emphysema: No  environmental allergies: Yes  pneumonia: Yes  Treatments tried: body position changes  Improvement on treatment: no relief  Subjective:       Patient ID: Tamica Cardona is a 64 y.o. female.    Chief Complaint: Chest Congestion and Cough    HPI onset two weeks. Wheezing. Productive cough. Yellow thick mucous. Taking sudafed, mucinex without much relief. Symptoms progressing. See ROS.    The following portion of the patients history was reviewed and updated as appropriate: allergies, current medications, past medical and surgical history. Past social history and problem list reviewed. Family PMH and Past social history reviewed. Tobacco, Illicit drug use reviewed.      Review of patient's allergies indicates:   Allergen Reactions    Erythromycin base      Other reaction(s): Vomiting  Other reaction(s): Stomach upset  Other reaction(s): Nausea    Penicillins      Other reaction(s): convulsions    Thimerosal          Current Outpatient Medications:     acetaminophen (TYLENOL EXTRA STRENGTH) 500 MG tablet, Take 750 mg by mouth every 6 (six) hours as needed for Pain., Disp: , Rfl:     aspirin (ECOTRIN) 325 MG EC tablet, Take 650 mg by mouth every 6 (six) hours as needed for Pain., Disp: , Rfl:     calcium carbonate (OS-DESTINY) 600 mg (1,500 mg) Tab, Take 600 mg by mouth 2 (two) times daily with meals., Disp: , Rfl:     escitalopram oxalate (LEXAPRO) 10 MG tablet, Take 1 tablet (10 mg total) by mouth once daily., Disp: 90 tablet, Rfl: 3    ferrous gluconate (FERGON) 325 MG tablet, Take 45 mg by mouth daily with  breakfast. , Disp: , Rfl:     FEXOFENADINE HCL (ALLEGRA) 60 mg Cap, Twice a day  PRN, Disp: , Rfl:     guaiFENesin 1,200 mg Ta12, , Disp: , Rfl:     meclizine (ANTIVERT) 25 mg tablet, Take 1 tablet (25 mg total) by mouth 3 (three) times daily as needed., Disp: 30 tablet, Rfl: 1    mometasone (ELOCON) 0.1 % ointment, aaa bid x 1-2 weeks then prn, Disp: 45 g, Rfl: 6    phenazopyridine (PYRIDIUM) 200 MG tablet, Take 1 tablet (200 mg total) by mouth every 6 (six) hours as needed., Disp: 30 tablet, Rfl: 2    pseudoephedrine (SUDAFED) 60 MG tablet, , Disp: , Rfl:     vitamin D (VITAMIN D3) 1000 units Tab, Take 1,000 Units by mouth once daily., Disp: , Rfl:     Past Medical History:   Diagnosis Date    Allergy     Breast cancer 11/2003    right    Interstitial cystitis 2003    Neutropenia        Past Surgical History:   Procedure Laterality Date    BREAST BIOPSY Left 2006    benign    CYSTOSCOPY  2003    EYE SURGERY  January 2015    HYSTERECTOMY      TVH, ovaries intact. Uterine prolapse    KNEE ARTHROSCOPY W/ MENISCECTOMY  1992    LT    left breast biopsy  2006    benign    MASTECTOMY, RADICAL  12/2003    RT    RETINAL DETACHMENT SURGERY Right        Social History     Socioeconomic History    Marital status:      Spouse name: Not on file    Number of children: Not on file    Years of education: Not on file    Highest education level: Not on file   Occupational History    Not on file   Social Needs    Financial resource strain: Not hard at all    Food insecurity:     Worry: Never true     Inability: Never true    Transportation needs:     Medical: No     Non-medical: No   Tobacco Use    Smoking status: Never Smoker    Smokeless tobacco: Never Used   Substance and Sexual Activity    Alcohol use: No     Frequency: 2-4 times a month     Drinks per session: 1 or 2     Binge frequency: Never    Drug use: No    Sexual activity: Not on file   Lifestyle    Physical activity:     Days per  "week: 3 days     Minutes per session: 50 min    Stress: Not at all   Relationships    Social connections:     Talks on phone: More than three times a week     Gets together: Twice a week     Attends Mandaen service: Not on file     Active member of club or organization: No     Attends meetings of clubs or organizations: Never     Relationship status:    Other Topics Concern    Are you pregnant or think you may be? Not Asked    Breast-feeding Not Asked   Social History Narrative    Not on file     Review of Systems   Constitutional: Positive for chills, fatigue and fever.   HENT: Positive for ear pain, postnasal drip, rhinorrhea, sinus pressure and sinus pain. Negative for sore throat.    Respiratory: Positive for cough and wheezing. Negative for chest tightness and shortness of breath.    Cardiovascular: Negative for chest pain, palpitations and leg swelling.   Gastrointestinal: Negative for abdominal pain, diarrhea, nausea and vomiting.   Genitourinary: Negative for difficulty urinating and dysuria.   Musculoskeletal: Positive for myalgias. Negative for arthralgias, back pain and gait problem.   Skin: Negative for rash.   Allergic/Immunologic: Positive for environmental allergies.   Neurological: Positive for dizziness and headaches.       Objective:      /62   Pulse 72   Temp 99 °F (37.2 °C) (Oral)   Resp 20   Ht 5' 5.5" (1.664 m)   Wt 64.7 kg (142 lb 8.4 oz)   SpO2 97%   BMI 23.36 kg/m²      Physical Exam   Constitutional: She is oriented to person, place, and time. She appears well-developed and well-nourished. No distress.   HENT:   Head: Normocephalic.   Right Ear: External ear and ear canal normal. No drainage. Tympanic membrane is injected.   Left Ear: External ear and ear canal normal. No drainage. Tympanic membrane is injected.   Nose: Rhinorrhea present. Right sinus exhibits maxillary sinus tenderness. Right sinus exhibits no frontal sinus tenderness. Left sinus exhibits " maxillary sinus tenderness. Left sinus exhibits no frontal sinus tenderness.   Mouth/Throat: Uvula is midline and mucous membranes are normal. Posterior oropharyngeal erythema present. No oropharyngeal exudate or tonsillar abscesses. No tonsillar exudate.   Eyes: Pupils are equal, round, and reactive to light. Conjunctivae, EOM and lids are normal. Right eye exhibits no discharge and no exudate. Left eye exhibits no discharge and no exudate.   Neck: Trachea normal and normal range of motion. Neck supple. No JVD present. Carotid bruit is not present. No neck rigidity. No thyroid mass and no thyromegaly present.   Cardiovascular: Normal rate, regular rhythm and normal heart sounds. Exam reveals no gallop.   No murmur heard.  Pulmonary/Chest: No stridor. No respiratory distress. She has no decreased breath sounds. She has wheezes (end exp wheezing) in the right lower field and the left lower field. She has no rhonchi. She has no rales.   Abdominal: Soft. Bowel sounds are normal. She exhibits no distension. There is no hepatosplenomegaly. There is no tenderness.   Musculoskeletal:   Gait and coordination normal.  strong, equal.  Upper and lower extremity strength is normal.    Lymphadenopathy:        Head (right side): Submandibular adenopathy present.        Head (left side): Submandibular adenopathy present.   Neurological: She is alert and oriented to person, place, and time.   Skin: Skin is warm and dry. Capillary refill takes less than 2 seconds. No rash noted. She is not diaphoretic.   Psychiatric: She has a normal mood and affect. Her speech is normal and behavior is normal.       Assessment:       1. Mild intermittent reactive airway disease with acute exacerbation    2. Acute maxillary sinusitis, recurrence not specified    3. Sinus headache    4. Wheezing    5. Dizziness        Plan:       Mild intermittent reactive airway disease with acute exacerbation  -     dexamethasone injection 8 mg:  Risks and  benefits discussed. Potential side effects such as anxiety, palpitations and flushing discussed. May increase blood glucose levels over the next 48 hours. Potential for skin atrophy at injection site. Tolerated injection well.      Acute maxillary sinusitis, recurrence not specified:  Due to duration and presenting exam will cover with antibiotics. Take as directed. Complete full course of medication.    Sinus headache: motrin prn. Continue mucinex and flonase.    Wheezing: prednisone taper. Take as directed.     Patient was given new medications today. Proper dosage, frequency, potential side effects and potential adverse reactions discussed. Advised to only take medication as prescribed. If any concerns or changes needed contact me ASAP.    Dizziness: refill meclizine for PRN use.     Other orders  -     levoFLOXacin (LEVAQUIN) 500 MG tablet; Take 1 tablet (500 mg total) by mouth once daily. for 10 days  Dispense: 10 tablet; Refill: 0  -     predniSONE (DELTASONE) 20 MG tablet; Take two tablets in am for 3 days, then one for 3 days then 1/2 for 2 days.  Dispense: 10 tablet; Refill: 0  -     meclizine (ANTIVERT) 25 mg tablet; Take 1 tablet (25 mg total) by mouth 3 (three) times daily as needed.  Dispense: 30 tablet; Refill: 1       Continue current medication  Take medications only as prescribed  Healthy diet  Adequate rest  Adequate hydration  Avoid allergens  Avoid excessive caffeine     Follow up in 1 week if symptoms are not improving

## 2020-05-06 ENCOUNTER — OFFICE VISIT (OUTPATIENT)
Dept: OPHTHALMOLOGY | Facility: CLINIC | Age: 65
End: 2020-05-06
Payer: COMMERCIAL

## 2020-05-06 DIAGNOSIS — H33.313 RETINAL TEAR OF BOTH EYES: ICD-10-CM

## 2020-05-06 DIAGNOSIS — H43.811 SYMPTOMATIC POSTERIOR VITREOUS DETACHMENT OF RIGHT EYE: Primary | ICD-10-CM

## 2020-05-06 DIAGNOSIS — H33.322 RETINAL HOLE OF LEFT EYE: ICD-10-CM

## 2020-05-06 PROCEDURE — 92014 COMPRE OPH EXAM EST PT 1/>: CPT | Mod: S$GLB,,, | Performed by: OPHTHALMOLOGY

## 2020-05-06 PROCEDURE — 99999 PR PBB SHADOW E&M-EST. PATIENT-LVL III: ICD-10-PCS | Mod: PBBFAC,,, | Performed by: OPHTHALMOLOGY

## 2020-05-06 PROCEDURE — 92014 PR EYE EXAM, EST PATIENT,COMPREHESV: ICD-10-PCS | Mod: S$GLB,,, | Performed by: OPHTHALMOLOGY

## 2020-05-06 PROCEDURE — 99999 PR PBB SHADOW E&M-EST. PATIENT-LVL III: CPT | Mod: PBBFAC,,, | Performed by: OPHTHALMOLOGY

## 2020-05-06 NOTE — PATIENT INSTRUCTIONS
What Are Flashes and Floaters?  Have you ever seen flashes of light, stars, or streaks that arent really there? A few of these flashes are seen by everyone from time to time. Usually you see them in one eye at a time. Flashes are often caused by the gel filling inside of your eye, called the vitreous, pulling on the retina. The retina is a membrane that lines the inside of your eye.  Floaters look like dark specks, clouds, threads, or spider webs moving through your eyesight. Most people see them once in a while. Floaters may be pieces of gel or other material floating inside your eye. They are usually harmless.      Who Gets Flashes?  As you age or if you are nearsighted, you are more likely to see flashes. Nearsightedness is when you have fuzzy distance vision. Sometimes, flashes are a sign of other eye problems that need care.  Who Gets Floaters?  The older you get, the more likely youll notice floaters. Floaters can also be caused by an eye injury or surgery. People who are very nearsighted may get more floaters. If floaters appear suddenly or greatly increase in number, see your healthcare provider. This may be a sign of an eye problem.  Date Last Reviewed: 5/31/2015 © 2000-2017 [employer]. [address] [place] [address], [place] [address]. All rights reserved. This information is not intended as a substitute for professional medical care. Always follow your healthcare professional's instructions.

## 2020-05-06 NOTE — PROGRESS NOTES
HPI     DLE- 10/29/19     Pt is here for flashes and floaters appearing in OD for the past two weeks   constantly. Pt denies eye pain. Denies va distortions. Denies waves in va.   Denies spider web images or veil. Denies dm or htn. Occasional use of   allergy gtts prn. Hx of RD in OD.     Last edited by Ronald Schilling on 5/6/2020  8:19 AM. (History)        ROS     Negative for: Constitutional, Gastrointestinal, Neurological, Skin,   Genitourinary, Musculoskeletal, HENT, Endocrine, Cardiovascular, Eyes,   Respiratory, Psychiatric, Allergic/Imm, Heme/Lymph    Last edited by Hasmukh Branch Jr., MD on 5/6/2020  9:22 AM. (History)        Assessment /Plan     For exam results, see Encounter Report.    Symptomatic posterior vitreous detachment of right eye- no new retinal tears  Patient was counseled on the following:  IF YOU HAVE:  1. Increase in floaters or flashing lights  2. Worsening vision  3. Appearance of a persistent curtain  4. Shadow anywhere in the field  of vision  Return immediately if these symptoms develop.  There is an increased possibility that you will have the develop floaters and flashes in the opposite eye.    Retinal tear of both eyes    Retinal hole of left eye  S/p barrier laser OU- stable  Follow up in about 1 year (around 5/6/2021) for Annual.

## 2020-05-11 ENCOUNTER — PATIENT MESSAGE (OUTPATIENT)
Dept: FAMILY MEDICINE | Facility: CLINIC | Age: 65
End: 2020-05-11

## 2020-05-11 DIAGNOSIS — Z20.822 EXPOSURE TO COVID-19 VIRUS: Primary | ICD-10-CM

## 2020-05-14 ENCOUNTER — LAB VISIT (OUTPATIENT)
Dept: LAB | Facility: HOSPITAL | Age: 65
End: 2020-05-14
Attending: NURSE PRACTITIONER
Payer: COMMERCIAL

## 2020-05-14 DIAGNOSIS — Z20.822 EXPOSURE TO COVID-19 VIRUS: ICD-10-CM

## 2020-05-14 LAB — SARS-COV-2 IGG SERPLBLD QL IA.RAPID: NEGATIVE

## 2020-05-14 PROCEDURE — 36415 COLL VENOUS BLD VENIPUNCTURE: CPT | Mod: PO

## 2020-05-14 PROCEDURE — 86769 SARS-COV-2 COVID-19 ANTIBODY: CPT

## 2020-05-15 ENCOUNTER — PATIENT MESSAGE (OUTPATIENT)
Dept: FAMILY MEDICINE | Facility: CLINIC | Age: 65
End: 2020-05-15

## 2020-06-16 ENCOUNTER — HOSPITAL ENCOUNTER (EMERGENCY)
Facility: HOSPITAL | Age: 65
Discharge: HOME | End: 2020-06-16
Attending: EMERGENCY MEDICINE
Payer: COMMERCIAL

## 2020-06-16 VITALS
RESPIRATION RATE: 18 BRPM | BODY MASS INDEX: 23.3 KG/M2 | OXYGEN SATURATION: 100 % | HEART RATE: 70 BPM | DIASTOLIC BLOOD PRESSURE: 80 MMHG | SYSTOLIC BLOOD PRESSURE: 164 MMHG | WEIGHT: 145 LBS | HEIGHT: 66 IN | TEMPERATURE: 98 F

## 2020-06-16 DIAGNOSIS — R51.9 ACUTE NONINTRACTABLE HEADACHE, UNSPECIFIED HEADACHE TYPE: ICD-10-CM

## 2020-06-16 DIAGNOSIS — I10 HYPERTENSION, UNSPECIFIED TYPE: Primary | ICD-10-CM

## 2020-06-16 PROCEDURE — 99281 EMR DPT VST MAYX REQ PHY/QHP: CPT

## 2020-06-17 NOTE — ED TRIAGE NOTES
Pt with no hx of HTN is reporting HTN, lightheadedness, and an intermittent frontal headache x1 day. Denies chest pain, cough, abd pain, sob

## 2020-06-17 NOTE — ED PROVIDER NOTES
Encounter Date: 6/16/2020    SCRIBE #1 NOTE: I, Martina Levin am scribing for, and in the presence of, Demar Garcia MD.       History     Chief Complaint   Patient presents with    Hypertension     Felt like Bp was high -      Time seen by provider: 10:54 PM on 06/16/2020    Tamica Cardona is a 64 y.o. female with PMHx of HLD who presents to the ED with an onset of HA and light-headedness secondary to high blood pressure for 3 hours. The patient reports that she took her BP at home and the highest reading was 180/97. The patient denies dizziness, abdominal pain, fever, syncope, burning urination, vomiting,  or any other symptoms at this time. No pertinent PSHx. Known drug allergies include erythromycin, penicillins, and thimerosal.      The history is provided by the patient.     Review of patient's allergies indicates:   Allergen Reactions    Erythromycin base      Other reaction(s): Vomiting  Other reaction(s): Stomach upset  Other reaction(s): Nausea    Penicillins      Other reaction(s): convulsions    Thimerosal      Past Medical History:   Diagnosis Date    Allergy     Breast cancer 11/2003    right    Interstitial cystitis 2003    Neutropenia      Past Surgical History:   Procedure Laterality Date    BREAST BIOPSY Left 2006    benign    CYSTOSCOPY  2003    EYE SURGERY  January 2015    HYSTERECTOMY      TVH, ovaries intact. Uterine prolapse    KNEE ARTHROSCOPY W/ MENISCECTOMY  1992    LT    left breast biopsy  2006    benign    MASTECTOMY, RADICAL  12/2003    RT    RETINAL DETACHMENT SURGERY Right      Family History   Problem Relation Age of Onset    Cancer Father         bladder, prostate, lung cancer    Glaucoma Father     Cataracts Father     Macular degeneration Father     No Known Problems Mother     Cataracts Brother     Retinal detachment Brother     Glaucoma Brother     No Known Problems Sister     No Known Problems Maternal Aunt     No Known Problems Maternal  Uncle     No Known Problems Paternal Aunt     No Known Problems Paternal Uncle     No Known Problems Maternal Grandmother     No Known Problems Maternal Grandfather     No Known Problems Paternal Grandmother     No Known Problems Paternal Grandfather     Urolithiasis Neg Hx     Kidney cancer Neg Hx     Amblyopia Neg Hx     Blindness Neg Hx     Diabetes Neg Hx     Hypertension Neg Hx     Strabismus Neg Hx     Stroke Neg Hx     Thyroid disease Neg Hx     Melanoma Neg Hx      Social History     Tobacco Use    Smoking status: Never Smoker    Smokeless tobacco: Never Used   Substance Use Topics    Alcohol use: No     Frequency: 2-4 times a month     Drinks per session: 1 or 2     Binge frequency: Never    Drug use: No     Review of Systems   Constitutional: Negative for fever.   HENT: Negative for sore throat.    Respiratory: Negative for shortness of breath.    Cardiovascular: Negative for chest pain.   Gastrointestinal: Negative for abdominal pain, nausea and vomiting.   Genitourinary: Negative for dysuria.   Musculoskeletal: Negative for back pain.   Skin: Negative for rash.   Neurological: Positive for light-headedness and headaches. Negative for dizziness, syncope and weakness.   Hematological: Does not bruise/bleed easily.       Physical Exam     Initial Vitals [06/16/20 2120]   BP Pulse Resp Temp SpO2   (!) 194/92 82 18 98.4 °F (36.9 °C) 98 %      MAP       --         Physical Exam    Nursing note and vitals reviewed.  Constitutional: She appears well-developed and well-nourished. She is not diaphoretic. No distress.   HENT:   Head: Normocephalic and atraumatic.   Mouth/Throat: Oropharynx is clear and moist.   Eyes: Conjunctivae and EOM are normal. Pupils are equal, round, and reactive to light. Right eye exhibits no nystagmus. Left eye exhibits no nystagmus.   Neck: Neck supple.   Cardiovascular: Normal rate, regular rhythm, normal heart sounds and intact distal pulses. Exam reveals no gallop  and no friction rub.    No murmur heard.  Pulmonary/Chest: Breath sounds normal. She has no wheezes. She has no rhonchi. She has no rales.   Abdominal: Soft. She exhibits no distension. There is no abdominal tenderness.   Musculoskeletal: Normal range of motion.   Neurological: She is alert and oriented to person, place, and time. She has normal strength. Coordination and gait normal.   Cranial nerves 3 - 12 intact. 5/5 strength and sensation.    Skin: No rash noted. No erythema.   Psychiatric: Her speech is normal.         ED Course   Procedures  Labs Reviewed - No data to display       Imaging Results    None          Medical Decision Making:   History:   Old Medical Records: I decided to obtain old medical records.            Scribe Attestation:   Scribe #1: I performed the above scribed service and the documentation accurately describes the services I performed. I attest to the accuracy of the note.    I, Dr. Demar Garcia, personally performed the services described in this documentation. All medical record entries made by the scribe were at my direction and in my presence.  I have reviewed the chart and agree that the record reflects my personal performance and is accurate and complete. Demar Garcia MD.  11:35 PM 06/16/2020    Tamica Cardona is a 64 y.o. female presenting with mild headache as well as hypertension.  Patient states headache is too mild even to take Tylenol offered here and similar to prior.  I have very low suspicion for alternative causes of headache such as intracranial hemorrhage, ischemic process, meningitis, idiopathic intracranial hypertension, or cavernous venous sinus thrombosis.  The patient has a non-focal neurological examination with history not consistent with emergent causes of headache warranting present therapeutic intervention.  I do not think further brain imaging or lumbar puncture are indicated at this time.  I have very low suspicion for hypertensive  emergency.  Blood pressure noted to moderate towards normal level with observation without other intervention.  I instructed her on close PCP follow-up for recheck as well as review detailed return precautions.                      Clinical Impression:       ICD-10-CM ICD-9-CM   1. Hypertension, unspecified type  I10 401.9   2. Acute nonintractable headache, unspecified headache type  R51 784.0         Disposition:   Disposition: Discharged  Condition: Stable     ED Disposition Condition    Discharge Stable        ED Prescriptions     None        Follow-up Information     Follow up With Specialties Details Why Contact Info    Your PCP, 1 week                                         Demar Garcia MD  06/16/20 [id number]

## 2020-06-17 NOTE — DISCHARGE INSTRUCTIONS
As we discussed, return to the emergency department for new or worsening symptoms including vomiting, new numbness or weakness, or difficulty walking.

## 2020-10-05 ENCOUNTER — OFFICE VISIT (OUTPATIENT)
Dept: FAMILY MEDICINE | Facility: CLINIC | Age: 65
End: 2020-10-05
Payer: COMMERCIAL

## 2020-10-05 VITALS
TEMPERATURE: 97 F | WEIGHT: 147.69 LBS | BODY MASS INDEX: 23.74 KG/M2 | SYSTOLIC BLOOD PRESSURE: 139 MMHG | HEART RATE: 76 BPM | HEIGHT: 66 IN | DIASTOLIC BLOOD PRESSURE: 70 MMHG | RESPIRATION RATE: 18 BRPM

## 2020-10-05 DIAGNOSIS — Z00.00 LABORATORY EXAM ORDERED AS PART OF ROUTINE GENERAL MEDICAL EXAMINATION: ICD-10-CM

## 2020-10-05 DIAGNOSIS — E78.5 HYPERLIPIDEMIA, UNSPECIFIED HYPERLIPIDEMIA TYPE: ICD-10-CM

## 2020-10-05 DIAGNOSIS — R06.02 SOB (SHORTNESS OF BREATH) ON EXERTION: Primary | ICD-10-CM

## 2020-10-05 DIAGNOSIS — Z12.39 ENCOUNTER FOR SCREENING FOR MALIGNANT NEOPLASM OF BREAST, UNSPECIFIED SCREENING MODALITY: ICD-10-CM

## 2020-10-05 DIAGNOSIS — D50.9 IRON DEFICIENCY ANEMIA, UNSPECIFIED IRON DEFICIENCY ANEMIA TYPE: ICD-10-CM

## 2020-10-05 DIAGNOSIS — R07.9 CHEST PAIN, UNSPECIFIED TYPE: ICD-10-CM

## 2020-10-05 LAB
BASOPHILS # BLD AUTO: 0.05 K/UL (ref 0–0.2)
BASOPHILS NFR BLD: 1.1 % (ref 0–1.9)
DIFFERENTIAL METHOD: NORMAL
EOSINOPHIL # BLD AUTO: 0.1 K/UL (ref 0–0.5)
EOSINOPHIL NFR BLD: 2.6 % (ref 0–8)
ERYTHROCYTE [DISTWIDTH] IN BLOOD BY AUTOMATED COUNT: 13.5 % (ref 11.5–14.5)
HCT VFR BLD AUTO: 41.7 % (ref 37–48.5)
HGB BLD-MCNC: 13.6 G/DL (ref 12–16)
IMM GRANULOCYTES # BLD AUTO: 0.01 K/UL (ref 0–0.04)
IMM GRANULOCYTES NFR BLD AUTO: 0.2 % (ref 0–0.5)
LYMPHOCYTES # BLD AUTO: 1.1 K/UL (ref 1–4.8)
LYMPHOCYTES NFR BLD: 23.1 % (ref 18–48)
MCH RBC QN AUTO: 30.4 PG (ref 27–31)
MCHC RBC AUTO-ENTMCNC: 32.6 G/DL (ref 32–36)
MCV RBC AUTO: 93 FL (ref 82–98)
MONOCYTES # BLD AUTO: 0.3 K/UL (ref 0.3–1)
MONOCYTES NFR BLD: 7.1 % (ref 4–15)
NEUTROPHILS # BLD AUTO: 3.1 K/UL (ref 1.8–7.7)
NEUTROPHILS NFR BLD: 65.9 % (ref 38–73)
NRBC BLD-RTO: 0 /100 WBC
PLATELET # BLD AUTO: 187 K/UL (ref 150–350)
PMV BLD AUTO: 11.9 FL (ref 9.2–12.9)
RBC # BLD AUTO: 4.48 M/UL (ref 4–5.4)
WBC # BLD AUTO: 4.68 K/UL (ref 3.9–12.7)

## 2020-10-05 PROCEDURE — 3078F PR MOST RECENT DIASTOLIC BLOOD PRESSURE < 80 MM HG: ICD-10-PCS | Mod: CPTII,S$GLB,, | Performed by: NURSE PRACTITIONER

## 2020-10-05 PROCEDURE — 99214 PR OFFICE/OUTPT VISIT, EST, LEVL IV, 30-39 MIN: ICD-10-PCS | Mod: 25,S$GLB,, | Performed by: NURSE PRACTITIONER

## 2020-10-05 PROCEDURE — 3075F SYST BP GE 130 - 139MM HG: CPT | Mod: CPTII,S$GLB,, | Performed by: NURSE PRACTITIONER

## 2020-10-05 PROCEDURE — 90471 IMMUNIZATION ADMIN: CPT | Mod: S$GLB,,, | Performed by: NURSE PRACTITIONER

## 2020-10-05 PROCEDURE — 90682 RIV4 VACC RECOMBINANT DNA IM: CPT | Mod: S$GLB,,, | Performed by: NURSE PRACTITIONER

## 2020-10-05 PROCEDURE — 3008F BODY MASS INDEX DOCD: CPT | Mod: CPTII,S$GLB,, | Performed by: NURSE PRACTITIONER

## 2020-10-05 PROCEDURE — 80061 LIPID PANEL: CPT

## 2020-10-05 PROCEDURE — 3078F DIAST BP <80 MM HG: CPT | Mod: CPTII,S$GLB,, | Performed by: NURSE PRACTITIONER

## 2020-10-05 PROCEDURE — 80053 COMPREHEN METABOLIC PANEL: CPT

## 2020-10-05 PROCEDURE — 99214 OFFICE O/P EST MOD 30 MIN: CPT | Mod: 25,S$GLB,, | Performed by: NURSE PRACTITIONER

## 2020-10-05 PROCEDURE — 93010 ELECTROCARDIOGRAM REPORT: CPT | Mod: S$GLB,,, | Performed by: INTERNAL MEDICINE

## 2020-10-05 PROCEDURE — 90471 FLU VACCINE - QUADRIVALENT (RECOMBINANT) PRESERVATIVE FREE: ICD-10-PCS | Mod: S$GLB,,, | Performed by: NURSE PRACTITIONER

## 2020-10-05 PROCEDURE — 85025 COMPLETE CBC W/AUTO DIFF WBC: CPT

## 2020-10-05 PROCEDURE — 93005 EKG 12-LEAD: ICD-10-PCS | Mod: S$GLB,,, | Performed by: NURSE PRACTITIONER

## 2020-10-05 PROCEDURE — 3075F PR MOST RECENT SYSTOLIC BLOOD PRESS GE 130-139MM HG: ICD-10-PCS | Mod: CPTII,S$GLB,, | Performed by: NURSE PRACTITIONER

## 2020-10-05 PROCEDURE — 93005 ELECTROCARDIOGRAM TRACING: CPT | Mod: S$GLB,,, | Performed by: NURSE PRACTITIONER

## 2020-10-05 PROCEDURE — 3008F PR BODY MASS INDEX (BMI) DOCUMENTED: ICD-10-PCS | Mod: CPTII,S$GLB,, | Performed by: NURSE PRACTITIONER

## 2020-10-05 PROCEDURE — 83540 ASSAY OF IRON: CPT

## 2020-10-05 PROCEDURE — 93010 EKG 12-LEAD: ICD-10-PCS | Mod: S$GLB,,, | Performed by: INTERNAL MEDICINE

## 2020-10-05 PROCEDURE — 90682 FLU VACCINE - QUADRIVALENT (RECOMBINANT) PRESERVATIVE FREE: ICD-10-PCS | Mod: S$GLB,,, | Performed by: NURSE PRACTITIONER

## 2020-10-05 RX ORDER — LORATADINE 10 MG/1
10 TABLET ORAL DAILY PRN
COMMUNITY
Start: 2020-07-03

## 2020-10-05 NOTE — PROGRESS NOTES
Answers for HPI/ROS submitted by the patient on 10/3/2020   Shortness of breath  Chronicity: new  Onset: more than 1 month ago  Frequency: every several days  Progression since onset: unchanged  abdominal pain: No  chest pain: No  claudication: No  coryza: No  ear pain: No  fever: No  headaches: No  hemoptysis: No  leg pain: No  leg swelling: No  neck pain: No  orthopnea: Yes  PND: No  rash: No  rhinorrhea: No  sore throat: No  sputum production: Yes  swollen glands: No  syncope: No  vomiting: No  wheezing: No  Aggravating factors: emotional upset, exercise, any activity  Improvement on treatment: significant  Risk factors for DVT/PE: no known risk factors  Treatments tried: rest  asthma: No  allergies: Yes  COPD: No  pneumonia: No  aspirin allergies: No  CAD: No  DVT: No  heart failure: No  PE: No  recent surgery: No  bronchiolitis: No  chronic lung disease: No    Subjective:       Patient ID: Tamica Cardona is a 64 y.o. female.    Chief Complaint: Shortness of Breath (upon activity; Symptoms on and off for 6 weeks) and Dizziness (Light headed feeling)    HPI states off and on over the past 6 weeks having more SOB, worse with activity, or if she gets upset, anxious. States will get lightheaded at times. Noticed it the first time when she was mowing the grass. Had to go in every 15 minutes. States it was really hot that day. States she had chest pain one day.  She then found that when going up steps she is short of breath. Then one week ago it happened and she got lightheaded. No chest pain any other time.. Hydrating well and eating well. States she has not been sleeping well. She states the chest pain did  not radiate. She does not get nauseated or diaphoretic. She has no heart history. Denies palpitations. Last echo was done in 2012.  Will get EKG and schedule stress test. She has good BP reading today. Will check labs.     She is not taking any medication for her hyperlipidemia. She had elevated LDL per last  labs a year ago at 185.  Will recheck labs. See ROS.    The following portion of the patients history was reviewed and updated as appropriate: allergies, current medications, past medical and surgical history. Past social history and problem list reviewed. Family PMH and Past social history reviewed. Tobacco, Illicit drug use reviewed.      Review of patient's allergies indicates:   Allergen Reactions    Erythromycin base      Other reaction(s): Vomiting  Other reaction(s): Stomach upset  Other reaction(s): Nausea    Penicillins      Other reaction(s): convulsions    Thimerosal          Current Outpatient Medications:     acetaminophen (TYLENOL EXTRA STRENGTH) 500 MG tablet, Take 750 mg by mouth every 6 (six) hours as needed for Pain., Disp: , Rfl:     calcium carbonate (OS-DESTINY) 600 mg (1,500 mg) Tab, Take 600 mg by mouth 2 (two) times daily with meals., Disp: , Rfl:     escitalopram oxalate (LEXAPRO) 10 MG tablet, Take 1 tablet (10 mg total) by mouth once daily., Disp: 90 tablet, Rfl: 3    ferrous gluconate (FERGON) 325 MG tablet, Take 45 mg by mouth daily with breakfast. , Disp: , Rfl:     loratadine (CLARITIN) 10 mg tablet, , Disp: , Rfl:     mometasone (ELOCON) 0.1 % ointment, aaa bid x 1-2 weeks then prn, Disp: 45 g, Rfl: 6    vitamin D (VITAMIN D3) 1000 units Tab, Take 1,000 Units by mouth once daily., Disp: , Rfl:     aspirin (ECOTRIN) 325 MG EC tablet, Take 650 mg by mouth every 6 (six) hours as needed for Pain., Disp: , Rfl:     meclizine (ANTIVERT) 25 mg tablet, Take 1 tablet (25 mg total) by mouth 3 (three) times daily as needed. (Patient not taking: Reported on 10/5/2020), Disp: 30 tablet, Rfl: 1    phenazopyridine (PYRIDIUM) 200 MG tablet, Take 1 tablet (200 mg total) by mouth every 6 (six) hours as needed. (Patient not taking: Reported on 10/5/2020), Disp: 30 tablet, Rfl: 2    pseudoephedrine (SUDAFED) 60 MG tablet, , Disp: , Rfl:     Past Medical History:   Diagnosis Date    Allergy      Breast cancer 11/2003    right    Interstitial cystitis 2003    Neutropenia        Past Surgical History:   Procedure Laterality Date    BREAST BIOPSY Left 2006    benign    CYSTOSCOPY  2003    EYE SURGERY  January 2015    HYSTERECTOMY      TVH, ovaries intact. Uterine prolapse    KNEE ARTHROSCOPY W/ MENISCECTOMY  1992    LT    left breast biopsy  2006    benign    MASTECTOMY, RADICAL  12/2003    RT    RETINAL DETACHMENT SURGERY Right        Social History     Socioeconomic History    Marital status:      Spouse name: Not on file    Number of children: Not on file    Years of education: Not on file    Highest education level: Not on file   Occupational History    Not on file   Social Needs    Financial resource strain: Not hard at all    Food insecurity     Worry: Never true     Inability: Never true    Transportation needs     Medical: No     Non-medical: No   Tobacco Use    Smoking status: Never Smoker    Smokeless tobacco: Never Used   Substance and Sexual Activity    Alcohol use: No     Frequency: 2-4 times a month     Drinks per session: 1 or 2     Binge frequency: Never    Drug use: No    Sexual activity: Not on file   Lifestyle    Physical activity     Days per week: 3 days     Minutes per session: 50 min    Stress: Not at all   Relationships    Social connections     Talks on phone: More than three times a week     Gets together: Twice a week     Attends Hinduism service: Not on file     Active member of club or organization: No     Attends meetings of clubs or organizations: Never     Relationship status:    Other Topics Concern    Are you pregnant or think you may be? Not Asked    Breast-feeding Not Asked   Social History Narrative    Not on file     Review of Systems   Constitutional: Positive for fatigue. Negative for fever.   HENT: Negative for postnasal drip, rhinorrhea and sore throat.    Respiratory: Positive for shortness of breath. Negative for cough,  "chest tightness and wheezing.    Cardiovascular: Positive for chest pain (one episode a few weeks ago). Negative for palpitations and leg swelling.   Gastrointestinal: Negative for abdominal pain, constipation, diarrhea, nausea and vomiting.   Musculoskeletal: Negative for arthralgias, back pain, gait problem and neck pain.   Skin: Negative for rash.   Neurological: Negative for dizziness and headaches.   Hematological: Negative for adenopathy. Does not bruise/bleed easily.   Psychiatric/Behavioral: Positive for sleep disturbance (not sleeping well lately. ). The patient is not nervous/anxious.        Objective:      /70   Pulse 76   Temp 97.3 °F (36.3 °C) (Temporal)   Resp 18   Ht 5' 5.5" (1.664 m)   Wt 67 kg (147 lb 11.3 oz)   BMI 24.21 kg/m²      Physical Exam  Constitutional:       General: She is not in acute distress.     Appearance: Normal appearance. She is well-developed and normal weight.   HENT:      Head: Normocephalic.      Mouth/Throat:      Mouth: Mucous membranes are moist.      Pharynx: Oropharynx is clear.   Eyes:      Conjunctiva/sclera: Conjunctivae normal.      Pupils: Pupils are equal, round, and reactive to light.   Neck:      Musculoskeletal: Normal range of motion and neck supple. No edema, neck rigidity or muscular tenderness.      Thyroid: No thyromegaly.      Trachea: Trachea normal. No tracheal tenderness or tracheal deviation.   Cardiovascular:      Rate and Rhythm: Normal rate and regular rhythm.      Pulses:           Carotid pulses are 2+ on the right side and 2+ on the left side.       Radial pulses are 2+ on the right side and 2+ on the left side.      Heart sounds: Normal heart sounds. No murmur. No gallop.    Pulmonary:      Effort: Pulmonary effort is normal. No respiratory distress.      Breath sounds: Normal breath sounds. No stridor. No wheezing, rhonchi or rales.   Abdominal:      General: Bowel sounds are normal.      Palpations: Abdomen is soft. There is no " splenomegaly or mass.      Tenderness: There is no abdominal tenderness. There is no rebound.   Musculoskeletal: Normal range of motion.      Right lower leg: No edema.      Left lower leg: No edema.      Comments: Gait and coordination normal.  strong, equal. Upper and lower extremity strength normal.    Skin:     General: Skin is warm and dry.      Capillary Refill: Capillary refill takes less than 2 seconds.      Findings: No lesion or rash.   Neurological:      Mental Status: She is alert and oriented to person, place, and time.   Psychiatric:         Attention and Perception: Attention and perception normal.         Mood and Affect: Mood and affect normal.         Speech: Speech normal.         Behavior: Behavior normal.         Assessment:       1. SOB (shortness of breath) on exertion    2. Chest pain, unspecified type    3. Laboratory exam ordered as part of routine general medical examination    4. Encounter for screening for malignant neoplasm of breast, unspecified screening modality    5. Hyperlipidemia, unspecified hyperlipidemia type    6. Iron deficiency anemia, unspecified iron deficiency anemia type        Plan:       SOB (shortness of breath) on exertion: EKG looks fine. Schedule stress test.  Schedule labs.   -     Stress Echo Which stress agent will be used? Treadmill Exercise; Color Flow Doppler? No; Future  -     CBC auto differential  -     Comprehensive Metabolic Panel  -     Iron and TIBC  -     EKG 12-lead    Chest pain, unspecified type: get stress echo. If CP occurs again go to the ER.     Laboratory exam ordered as part of routine general medical examination  -     Lipid Panel    Encounter for screening for malignant neoplasm of breast, unspecified screening modality  -     Mammo Digital Screening Bilat; Future; Expected date: 10/05/2020    Hyperlipidemia, unspecified hyperlipidemia type: schedule labs. Follow low fat, low carb diet. Exercise.     Iron deficiency anemia, unspecified  iron deficiency anemia type: will check labs.     Due for mammogram of the left breast. She had radical mastectomy of the right breast.     Other orders  -     Influenza - Quadrivalent (Recombinant) (PF)     Continue current medication  Take medications only as prescribed  Healthy diet, exercise  Adequate rest  Adequate hydration  Avoid allergens  Avoid excessive caffeine     follow up after testing complete.

## 2020-10-06 ENCOUNTER — PATIENT MESSAGE (OUTPATIENT)
Dept: FAMILY MEDICINE | Facility: CLINIC | Age: 65
End: 2020-10-06

## 2020-10-06 LAB
ALBUMIN SERPL BCP-MCNC: 3.7 G/DL (ref 3.5–5.2)
ALP SERPL-CCNC: 81 U/L (ref 55–135)
ALT SERPL W/O P-5'-P-CCNC: 12 U/L (ref 10–44)
ANION GAP SERPL CALC-SCNC: 8 MMOL/L (ref 8–16)
AST SERPL-CCNC: 20 U/L (ref 10–40)
BILIRUB SERPL-MCNC: 0.7 MG/DL (ref 0.1–1)
BUN SERPL-MCNC: 15 MG/DL (ref 8–23)
CALCIUM SERPL-MCNC: 9.3 MG/DL (ref 8.7–10.5)
CHLORIDE SERPL-SCNC: 107 MMOL/L (ref 95–110)
CHOLEST SERPL-MCNC: 238 MG/DL (ref 120–199)
CHOLEST/HDLC SERPL: 4.7 {RATIO} (ref 2–5)
CO2 SERPL-SCNC: 28 MMOL/L (ref 23–29)
CREAT SERPL-MCNC: 0.8 MG/DL (ref 0.5–1.4)
EST. GFR  (AFRICAN AMERICAN): >60 ML/MIN/1.73 M^2
EST. GFR  (NON AFRICAN AMERICAN): >60 ML/MIN/1.73 M^2
GLUCOSE SERPL-MCNC: 80 MG/DL (ref 70–110)
HDLC SERPL-MCNC: 51 MG/DL (ref 40–75)
HDLC SERPL: 21.4 % (ref 20–50)
IRON SERPL-MCNC: 86 UG/DL (ref 30–160)
LDLC SERPL CALC-MCNC: 152.8 MG/DL (ref 63–159)
NONHDLC SERPL-MCNC: 187 MG/DL
POTASSIUM SERPL-SCNC: 4.7 MMOL/L (ref 3.5–5.1)
PROT SERPL-MCNC: 6.7 G/DL (ref 6–8.4)
SATURATED IRON: 26 % (ref 20–50)
SODIUM SERPL-SCNC: 143 MMOL/L (ref 136–145)
TOTAL IRON BINDING CAPACITY: 332 UG/DL (ref 250–450)
TRANSFERRIN SERPL-MCNC: 224 MG/DL (ref 200–375)
TRIGL SERPL-MCNC: 171 MG/DL (ref 30–150)

## 2020-10-12 ENCOUNTER — TELEPHONE (OUTPATIENT)
Dept: FAMILY MEDICINE | Facility: CLINIC | Age: 65
End: 2020-10-12

## 2020-10-12 ENCOUNTER — CLINICAL SUPPORT (OUTPATIENT)
Dept: CARDIOLOGY | Facility: CLINIC | Age: 65
End: 2020-10-12
Attending: NURSE PRACTITIONER
Payer: COMMERCIAL

## 2020-10-12 VITALS — BODY MASS INDEX: 24.49 KG/M2 | WEIGHT: 147 LBS | HEIGHT: 65 IN

## 2020-10-12 DIAGNOSIS — I48.91 NEW ONSET ATRIAL FIBRILLATION: Primary | ICD-10-CM

## 2020-10-12 DIAGNOSIS — R06.02 SOB (SHORTNESS OF BREATH) ON EXERTION: ICD-10-CM

## 2020-10-12 PROCEDURE — 93306 TTE W/DOPPLER COMPLETE: CPT | Mod: S$GLB,,, | Performed by: INTERNAL MEDICINE

## 2020-10-12 PROCEDURE — 99999 PR PBB SHADOW E&M-EST. PATIENT-LVL II: ICD-10-PCS | Mod: PBBFAC,,,

## 2020-10-12 PROCEDURE — 93306 ECHO (CUPID ONLY): ICD-10-PCS | Mod: S$GLB,,, | Performed by: INTERNAL MEDICINE

## 2020-10-12 PROCEDURE — 99999 PR PBB SHADOW E&M-EST. PATIENT-LVL II: CPT | Mod: PBBFAC,,,

## 2020-10-12 NOTE — TELEPHONE ENCOUNTER
----- Message from Bonifacio Crocker MA sent at 10/12/2020  9:57 AM CDT -----  This patient was scheduled to have a Treadmill stress test. We are unable to put her on the treadmill because the patient went in to A fib. The patient will need to be put on an anticoagulant per Attending Cardiologist. She is having her echo done. Please advise.

## 2020-10-12 NOTE — TELEPHONE ENCOUNTER
----- Message from Fani Mancia sent at 10/12/2020 12:13 PM CDT -----  Regarding: questions  Contact: [phone]  Pt is calling to find out if she needs to come to her appt today at 1:45 pt did come earlier at 10:25 for a stress test pt said they couldn't give stress test. Pt is wondering if she was given echo this morning please contact pt back [phone]

## 2020-10-12 NOTE — TELEPHONE ENCOUNTER
I sent eliquis to the pharmacy. She takes one twice a day. She will need an appointment with Cardiology. Referral placed, please schedule.

## 2020-10-12 NOTE — PROGRESS NOTES
Subjective:       Patient ID: Tamica Cardona is a 64 y.o. female.    Chief Complaint: Discuss Health (Had echo yesterday, new Dx Of Afib)    HPI I saw her a few weeks ago for SOB, lightheadedness. We set her up for stress test. The EKG we did at that visit showed NSR with PAC's. When she went yesterday they found that she was popping in and out of atrial fibrillation. We were notified by cardiology that the stress echo would be postponed for now. I started her on Eliquis BID and we have set her up to establish with Cardiology. She has appointment with Dr. Polanco on Thursday. She feels like this has been an issue off and on for the past 6 months or more. States she will have episodes when she can feel her heart racing and it gets hard to take a breath. She denies any CP or SOB at this time. No other concerns. See ROS.    She was able to have Echo portion of the test with the following results:    · There is left ventricular concentric remodeling.  · The left ventricle is normal in size with normal systolic function. The estimated ejection fraction is 55%.  · Grade I diastolic dysfunction.  · The estimated PA systolic pressure is 25 mmHg.  · Normal right ventricular systolic function.  · Normal central venous pressure (3 mmHg).  · Mild-to-moderate mitral regurgitation.  · Mild tricuspid regurgitation.  · Mild-to-moderate aortic regurgitation.      The following portion of the patients history was reviewed and updated as appropriate: allergies, current medications, past medical and surgical history. Past social history and problem list reviewed. Family PMH and Past social history reviewed. Tobacco, Illicit drug use reviewed.      Review of patient's allergies indicates:   Allergen Reactions    Erythromycin base      Other reaction(s): Vomiting  Other reaction(s): Stomach upset  Other reaction(s): Nausea    Penicillins      Other reaction(s): convulsions    Thimerosal          Current Outpatient Medications:      apixaban (ELIQUIS) 5 mg Tab, Take 1 tablet (5 mg total) by mouth 2 (two) times daily., Disp: 60 tablet, Rfl: 0    calcium carbonate (OS-DESTINY) 600 mg (1,500 mg) Tab, Take 600 mg by mouth 2 (two) times daily with meals., Disp: , Rfl:     escitalopram oxalate (LEXAPRO) 10 MG tablet, Take 1 tablet (10 mg total) by mouth once daily., Disp: 90 tablet, Rfl: 3    ferrous gluconate (FERGON) 325 MG tablet, Take 45 mg by mouth daily with breakfast. , Disp: , Rfl:     loratadine (CLARITIN) 10 mg tablet, , Disp: , Rfl:     melatonin 5 mg Cap, , Disp: , Rfl:     mometasone (ELOCON) 0.1 % ointment, aaa bid x 1-2 weeks then prn, Disp: 45 g, Rfl: 6    omega 3-dha-epa-fish oil 500-100-1,000 mg Cap, , Disp: , Rfl:     vitamin D (VITAMIN D3) 1000 units Tab, Take 1,000 Units by mouth once daily., Disp: , Rfl:     acetaminophen (TYLENOL EXTRA STRENGTH) 500 MG tablet, Take 750 mg by mouth every 6 (six) hours as needed for Pain., Disp: , Rfl:     aspirin (ECOTRIN) 325 MG EC tablet, Take 650 mg by mouth every 6 (six) hours as needed for Pain., Disp: , Rfl:     meclizine (ANTIVERT) 25 mg tablet, Take 1 tablet (25 mg total) by mouth 3 (three) times daily as needed. (Patient not taking: Reported on 10/5/2020), Disp: 30 tablet, Rfl: 1    phenazopyridine (PYRIDIUM) 200 MG tablet, Take 1 tablet (200 mg total) by mouth every 6 (six) hours as needed. (Patient not taking: Reported on 10/5/2020), Disp: 30 tablet, Rfl: 2    pseudoephedrine (SUDAFED) 60 MG tablet, , Disp: , Rfl:     Past Medical History:   Diagnosis Date    Allergy     Breast cancer 11/2003    right    Interstitial cystitis 2003    Neutropenia        Past Surgical History:   Procedure Laterality Date    BREAST BIOPSY Left 2006    benign    CYSTOSCOPY  2003    EYE SURGERY  January 2015    HYSTERECTOMY      TVH, ovaries intact. Uterine prolapse    KNEE ARTHROSCOPY W/ MENISCECTOMY  1992    LT    left breast biopsy  2006    benign    MASTECTOMY, RADICAL  12/2003     RT    RETINAL DETACHMENT SURGERY Right        Social History     Socioeconomic History    Marital status:      Spouse name: Not on file    Number of children: Not on file    Years of education: Not on file    Highest education level: Not on file   Occupational History    Not on file   Social Needs    Financial resource strain: Not hard at all    Food insecurity     Worry: Never true     Inability: Never true    Transportation needs     Medical: No     Non-medical: No   Tobacco Use    Smoking status: Never Smoker    Smokeless tobacco: Never Used   Substance and Sexual Activity    Alcohol use: No     Frequency: 2-4 times a month     Drinks per session: 1 or 2     Binge frequency: Never    Drug use: No    Sexual activity: Not on file   Lifestyle    Physical activity     Days per week: 3 days     Minutes per session: 50 min    Stress: Not at all   Relationships    Social connections     Talks on phone: More than three times a week     Gets together: Twice a week     Attends [employer] service: Not on file     Active member of club or organization: No     Attends meetings of clubs or organizations: Never     Relationship status:    Other Topics Concern    Are you pregnant or think you may be? Not Asked    Breast-feeding Not Asked   Social History Narrative    Not on file     Review of Systems   Constitutional: Negative for fatigue and fever.   HENT: Negative for congestion, postnasal drip, rhinorrhea and voice change.    Eyes: Negative for visual disturbance.   Respiratory: Negative for cough, chest tightness, shortness of breath and wheezing.    Cardiovascular: Negative for chest pain, palpitations and leg swelling.   Gastrointestinal: Negative for abdominal pain, diarrhea, nausea and vomiting.   Musculoskeletal: Negative for arthralgias, back pain and gait problem.   Neurological: Negative for dizziness, weakness and headaches.   Psychiatric/Behavioral: Negative for dysphoric mood and  "sleep disturbance. The patient is not nervous/anxious.        Objective:      BP 98/60   Pulse 77   Temp 98.6 °F (37 °C) (Oral)   Resp 18   Ht 5' 5" (1.651 m)   Wt 67.1 kg (148 lb 0.6 oz)   BMI 24.63 kg/m²      Physical Exam  Constitutional:       General: She is not in acute distress.     Appearance: Normal appearance. She is well-developed and normal weight.   HENT:      Head: Normocephalic.   Eyes:      Conjunctiva/sclera: Conjunctivae normal.      Pupils: Pupils are equal, round, and reactive to light.   Neck:      Musculoskeletal: Normal range of motion and neck supple. No edema.      Thyroid: No thyromegaly.      Trachea: Trachea normal. No tracheal tenderness or tracheal deviation.   Cardiovascular:      Rate and Rhythm: Normal rate. Rhythm irregular.      Pulses:           Carotid pulses are 2+ on the right side and 2+ on the left side.       Radial pulses are 2+ on the right side and 2+ on the left side.      Heart sounds: Normal heart sounds. No murmur. No gallop.    Pulmonary:      Effort: Pulmonary effort is normal. No respiratory distress.      Breath sounds: Normal breath sounds. No stridor. No wheezing, rhonchi or rales.   Musculoskeletal: Normal range of motion.      Right lower leg: No edema.      Left lower leg: No edema.      Comments: Gait and coordination normal.  strong, equal. Upper and lower extremity strength normal.    Skin:     General: Skin is warm and dry.      Capillary Refill: Capillary refill takes less than 2 seconds.      Findings: No lesion or rash.   Neurological:      Mental Status: She is alert and oriented to person, place, and time.   Psychiatric:         Attention and Perception: Attention and perception normal.         Mood and Affect: Mood and affect normal.         Speech: Speech normal.         Behavior: Behavior normal.         Assessment:       1. New onset atrial fibrillation        Plan:       New onset atrial fibrillation: she is taking the eliquis. " Scheduled with Dr. Polanco for Thursday at 1:00.  Discussed symptoms that would require going to the ER. Avoid falls, cuts, etc. Her rate appears controlled at this time.       Continue current medication  Take medications only as prescribed  Healthy diet, exercise  Adequate rest  Adequate hydration  Avoid allergens  Avoid excessive caffeine     follow up after cardiology visit

## 2020-10-13 ENCOUNTER — OFFICE VISIT (OUTPATIENT)
Dept: FAMILY MEDICINE | Facility: CLINIC | Age: 65
End: 2020-10-13
Payer: COMMERCIAL

## 2020-10-13 VITALS
TEMPERATURE: 99 F | DIASTOLIC BLOOD PRESSURE: 60 MMHG | RESPIRATION RATE: 18 BRPM | BODY MASS INDEX: 24.67 KG/M2 | SYSTOLIC BLOOD PRESSURE: 98 MMHG | HEIGHT: 65 IN | WEIGHT: 148.06 LBS | HEART RATE: 77 BPM

## 2020-10-13 DIAGNOSIS — I48.91 NEW ONSET ATRIAL FIBRILLATION: Primary | ICD-10-CM

## 2020-10-13 LAB
ASCENDING AORTA: 2.5 CM
AV INDEX (PROSTH): 0.62
AV MEAN GRADIENT: 5 MMHG
AV PEAK GRADIENT: 9 MMHG
AV VALVE AREA: 1.95 CM2
AV VELOCITY RATIO: 0.62
BSA FOR ECHO PROCEDURE: 1.75 M2
CV ECHO LV RWT: 0.43 CM
DOP CALC AO PEAK VEL: 1.46 M/S
DOP CALC AO VTI: 37.26 CM
DOP CALC LVOT AREA: 3.1 CM2
DOP CALC LVOT DIAMETER: 2 CM
DOP CALC LVOT PEAK VEL: 0.91 M/S
DOP CALC LVOT STROKE VOLUME: 72.69 CM3
DOP CALCLVOT PEAK VEL VTI: 23.15 CM
E WAVE DECELERATION TIME: 258.82 MSEC
E/A RATIO: 0.87
E/E' RATIO: 13.4 M/S
ECHO LV POSTERIOR WALL: 0.99 CM (ref 0.6–1.1)
FRACTIONAL SHORTENING: 47 % (ref 28–44)
INTERVENTRICULAR SEPTUM: 0.89 CM (ref 0.6–1.1)
IVRT: 116.08 MSEC
LA MAJOR: 4.61 CM
LA MINOR: 5.01 CM
LA WIDTH: 2.77 CM
LEFT ATRIUM SIZE: 2.95 CM
LEFT ATRIUM VOLUME INDEX: 19.2 ML/M2
LEFT ATRIUM VOLUME: 33.35 CM3
LEFT INTERNAL DIMENSION IN SYSTOLE: 2.43 CM (ref 2.1–4)
LEFT VENTRICLE DIASTOLIC VOLUME INDEX: 55.69 ML/M2
LEFT VENTRICLE DIASTOLIC VOLUME: 96.65 ML
LEFT VENTRICLE MASS INDEX: 84 G/M2
LEFT VENTRICLE SYSTOLIC VOLUME INDEX: 12 ML/M2
LEFT VENTRICLE SYSTOLIC VOLUME: 20.85 ML
LEFT VENTRICULAR INTERNAL DIMENSION IN DIASTOLE: 4.59 CM (ref 3.5–6)
LEFT VENTRICULAR MASS: 145.48 G
LV LATERAL E/E' RATIO: 13.4 M/S
LV SEPTAL E/E' RATIO: 13.4 M/S
MV PEAK A VEL: 0.77 M/S
MV PEAK E VEL: 0.67 M/S
PISA MRMAX VEL: 0.06 M/S
PISA TR MAX VEL: 2.34 M/S
PULM VEIN S/D RATIO: 2.15
PV PEAK D VEL: 0.33 M/S
PV PEAK S VEL: 0.71 M/S
RA MAJOR: 4.46 CM
RA PRESSURE: 3 MMHG
RA WIDTH: 2.58 CM
RIGHT VENTRICULAR END-DIASTOLIC DIMENSION: 2.81 CM
SINUS: 2.46 CM
STJ: 2.45 CM
TDI LATERAL: 0.05 M/S
TDI SEPTAL: 0.05 M/S
TDI: 0.05 M/S
TR MAX PG: 22 MMHG
TRICUSPID ANNULAR PLANE SYSTOLIC EXCURSION: 2.01 CM
TV REST PULMONARY ARTERY PRESSURE: 25 MMHG

## 2020-10-13 PROCEDURE — 99214 OFFICE O/P EST MOD 30 MIN: CPT | Mod: S$GLB,,, | Performed by: NURSE PRACTITIONER

## 2020-10-13 PROCEDURE — 99214 PR OFFICE/OUTPT VISIT, EST, LEVL IV, 30-39 MIN: ICD-10-PCS | Mod: S$GLB,,, | Performed by: NURSE PRACTITIONER

## 2020-10-13 PROCEDURE — 3074F SYST BP LT 130 MM HG: CPT | Mod: CPTII,S$GLB,, | Performed by: NURSE PRACTITIONER

## 2020-10-13 PROCEDURE — 3008F BODY MASS INDEX DOCD: CPT | Mod: CPTII,S$GLB,, | Performed by: NURSE PRACTITIONER

## 2020-10-13 PROCEDURE — 3078F DIAST BP <80 MM HG: CPT | Mod: CPTII,S$GLB,, | Performed by: NURSE PRACTITIONER

## 2020-10-13 PROCEDURE — 3078F PR MOST RECENT DIASTOLIC BLOOD PRESSURE < 80 MM HG: ICD-10-PCS | Mod: CPTII,S$GLB,, | Performed by: NURSE PRACTITIONER

## 2020-10-13 PROCEDURE — 3074F PR MOST RECENT SYSTOLIC BLOOD PRESSURE < 130 MM HG: ICD-10-PCS | Mod: CPTII,S$GLB,, | Performed by: NURSE PRACTITIONER

## 2020-10-13 PROCEDURE — 3008F PR BODY MASS INDEX (BMI) DOCUMENTED: ICD-10-PCS | Mod: CPTII,S$GLB,, | Performed by: NURSE PRACTITIONER

## 2020-10-13 RX ORDER — MELATONIN 5 MG
1 CAPSULE ORAL NIGHTLY PRN
COMMUNITY
Start: 2020-10-07 | End: 2021-10-19

## 2020-10-14 PROBLEM — I48.0 PAROXYSMAL ATRIAL FIBRILLATION: Status: ACTIVE | Noted: 2020-10-14

## 2020-10-14 NOTE — PROGRESS NOTES
Subjective:    Patient ID:  Tamica Cardona is a 64 y.o. female who presents for evaluation of Consult (Ref by Tamica Kent NP ), Atrial Fibrillation, and Family hx (Mother; Afib )      Problem List Items Addressed This Visit        Cardiac/Vascular    Mixed hyperlipidemia - Primary    Paroxysmal atrial fibrillation      Other Visit Diagnoses     New onset atrial fibrillation              HPI    Referred for evaluation of atrial fibrillation  Was noted to have episodes of atrial fibrillation during stress test that was ultimately canceled.  Placed on Eliquis    The patient states that she has had issues with intermittent shortness of breath and chest pain for some time. Symptoms worse the past 6-7 weeks, this is what prompted stress testing.  Symptoms occur almost daily    No known prior arrhythmia history  Intermittent chest pain still  Intermittent shortness of breath still    Symptoms sound mostly like salvos of aFib  Under a lot of recent stress    Personal history of heart attack or stroke - None that she is aware of  Family history of heart disease - Mother; Afib     Past Medical History:   Diagnosis Date    Allergy     Atrial fibrillation 10/12/2020    Breast cancer 11/2003    right    Interstitial cystitis 2003    Neutropenia        Past Surgical History:   Procedure Laterality Date    BREAST BIOPSY Left 2006    benign    CYSTOSCOPY  2003    EYE SURGERY  January 2015    HYSTERECTOMY      TVH, ovaries intact. Uterine prolapse    KNEE ARTHROSCOPY W/ MENISCECTOMY  1992    LT    left breast biopsy  2006    benign    MASTECTOMY, RADICAL  12/2003    RT    RETINAL DETACHMENT SURGERY Right        Family History   Problem Relation Age of Onset    Cancer Father         bladder, prostate, lung cancer    Glaucoma Father     Cataracts Father     Macular degeneration Father     No Known Problems Mother     Cataracts Brother     Retinal detachment Brother     Glaucoma Brother     No Known  Problems Sister     No Known Problems Maternal Aunt     No Known Problems Maternal Uncle     No Known Problems Paternal Aunt     No Known Problems Paternal Uncle     No Known Problems Maternal Grandmother     No Known Problems Maternal Grandfather     No Known Problems Paternal Grandmother     No Known Problems Paternal Grandfather     Urolithiasis Neg Hx     Kidney cancer Neg Hx     Amblyopia Neg Hx     Blindness Neg Hx     Diabetes Neg Hx     Hypertension Neg Hx     Strabismus Neg Hx     Stroke Neg Hx     Thyroid disease Neg Hx     Melanoma Neg Hx        Social History     Socioeconomic History    Marital status:      Spouse name: Not on file    Number of children: Not on file    Years of education: Not on file    Highest education level: Not on file   Occupational History    Not on file   Social Needs    Financial resource strain: Not hard at all    Food insecurity     Worry: Never true     Inability: Never true    Transportation needs     Medical: No     Non-medical: No   Tobacco Use    Smoking status: Never Smoker    Smokeless tobacco: Never Used   Substance and Sexual Activity    Alcohol use: No     Frequency: 2-4 times a month     Drinks per session: 1 or 2     Binge frequency: Never    Drug use: No    Sexual activity: Not on file   Lifestyle    Physical activity     Days per week: 3 days     Minutes per session: 50 min    Stress: Not at all   Relationships    Social connections     Talks on phone: More than three times a week     Gets together: Twice a week     Attends [employer] service: Not on file     Active member of club or organization: No     Attends meetings of clubs or organizations: Never     Relationship status:    Other Topics Concern    Are you pregnant or think you may be? Not Asked    Breast-feeding Not Asked   Social History Narrative    Not on file       Review of patient's allergies indicates:   Allergen Reactions    Erythromycin base       "Other reaction(s): Vomiting  Other reaction(s): Stomach upset  Other reaction(s): Nausea    Penicillins      Other reaction(s): convulsions    Thimerosal        Review of Systems   Constitution: Negative for decreased appetite, fever and malaise/fatigue.   Eyes: Negative for blurred vision.   Cardiovascular: Negative for chest pain, dyspnea on exertion, irregular heartbeat and leg swelling.   Respiratory: Negative for cough, hemoptysis, shortness of breath and wheezing.    Endocrine: Negative for cold intolerance and heat intolerance.   Hematologic/Lymphatic: Negative for bleeding problem.   Musculoskeletal: Negative for muscle weakness and myalgias.   Gastrointestinal: Negative for abdominal pain, constipation and diarrhea.   Genitourinary: Negative for bladder incontinence.   Neurological: Negative for dizziness and weakness.   Psychiatric/Behavioral: Negative for depression.        Objective:     Vitals:    10/15/20 1324   BP: 131/81   BP Location: Right arm   Patient Position: Sitting   BP Method: Medium (Automatic)   Pulse: 80   Weight: 67.4 kg (148 lb 9.4 oz)   Height: 5' 5.5" (1.664 m)        Physical Exam   Constitutional: She is oriented to person, place, and time. She appears well-developed and well-nourished. No distress.   HENT:   Head: Normocephalic and atraumatic.   Neck: Neck supple. No JVD present.   Cardiovascular: Normal rate, regular rhythm and normal heart sounds. Exam reveals no gallop and no friction rub.   No murmur heard.  Pulmonary/Chest: Effort normal and breath sounds normal. No respiratory distress. She has no wheezes. She has no rales.   Abdominal: Soft. Bowel sounds are normal. There is no abdominal tenderness. There is no rebound and no guarding.   Musculoskeletal:         General: No tenderness or edema.   Neurological: She is alert and oriented to person, place, and time.   Skin: Skin is warm and dry.   Psychiatric: Her behavior is normal.   Nursing note and vitals reviewed.      "     Current Outpatient Medications on File Prior to Visit   Medication Sig    acetaminophen (TYLENOL EXTRA STRENGTH) 500 MG tablet Take 750 mg by mouth every 6 (six) hours as needed for Pain.    apixaban (ELIQUIS) 5 mg Tab Take 1 tablet (5 mg total) by mouth 2 (two) times daily.    aspirin (ECOTRIN) 325 MG EC tablet Take 650 mg by mouth every 6 (six) hours as needed for Pain.    calcium carbonate (OS-DESTINY) 600 mg (1,500 mg) Tab Take 600 mg by mouth 2 (two) times daily with meals.    escitalopram oxalate (LEXAPRO) 10 MG tablet Take 1 tablet (10 mg total) by mouth once daily.    ferrous gluconate (FERGON) 325 MG tablet Take 45 mg by mouth daily with breakfast.     loratadine (CLARITIN) 10 mg tablet     meclizine (ANTIVERT) 25 mg tablet Take 1 tablet (25 mg total) by mouth 3 (three) times daily as needed.    melatonin 5 mg Cap     mometasone (ELOCON) 0.1 % ointment aaa bid x 1-2 weeks then prn    omega 3-dha-epa-fish oil 500-100-1,000 mg Cap     phenazopyridine (PYRIDIUM) 200 MG tablet Take 1 tablet (200 mg total) by mouth every 6 (six) hours as needed.    pseudoephedrine (SUDAFED) 60 MG tablet     vitamin D (VITAMIN D3) 1000 units Tab Take 1,000 Units by mouth once daily.     No current facility-administered medications on file prior to visit.        Lipid Panel:   Lab Results   Component Value Date    CHOL 238 (H) 10/05/2020    HDL 51 10/05/2020    LDLCALC 152.8 10/05/2020    TRIG 171 (H) 10/05/2020    CHOLHDL 21.4 10/05/2020         The 10-year ASCVD risk score ([place] MAGANA Jr., et al., 2013) is: 6%    Values used to calculate the score:      Age: 64 years      Sex: Female      Is Non- : No      Diabetic: No      Tobacco smoker: No      Systolic Blood Pressure: 131 mmHg      Is BP treated: No      HDL Cholesterol: 51 mg/dL      Total Cholesterol: 238 mg/dL    All pertinent labs, imaging, and EKGs reviewed.  Patient's most recent EKG tracing was personally interpreted by this  provider.    Assessment:       1. Mixed hyperlipidemia    2. Paroxysmal atrial fibrillation    3. New onset atrial fibrillation         Plan:     Symptoms consistent with symptomatic paroxysmal aFib  BP/Pulse OK today  Echocardiogram reviewed     Continue Eliquis 5mg PO BID  48 hour Holter monitor   Start metoprolol succinate 25 mg PO Daily AFTER Holter is palced - Educated on risks/benefits of medication   After metoprolol is started, pursue nuclear stress test   As long as nuclear stress test negative, initiate flecainide 100 mg PO BID - Educated on risks/benefits of medication   EKG 1 week after initiating flecainide to reassess intervals     Continue other cardiac medications  Mediterranean Diet/Cardiovascular Exercise Program    Patient queried and all questions were answered.    F/u in 3 months to reassess      Signed:    Collin Roberts MD  10/15/2020 10:58 AM

## 2020-10-15 ENCOUNTER — PATIENT MESSAGE (OUTPATIENT)
Dept: CARDIOLOGY | Facility: CLINIC | Age: 65
End: 2020-10-15

## 2020-10-15 ENCOUNTER — OFFICE VISIT (OUTPATIENT)
Dept: CARDIOLOGY | Facility: CLINIC | Age: 65
End: 2020-10-15
Payer: COMMERCIAL

## 2020-10-15 ENCOUNTER — PATIENT MESSAGE (OUTPATIENT)
Dept: FAMILY MEDICINE | Facility: CLINIC | Age: 65
End: 2020-10-15

## 2020-10-15 VITALS
HEART RATE: 80 BPM | WEIGHT: 148.56 LBS | DIASTOLIC BLOOD PRESSURE: 81 MMHG | SYSTOLIC BLOOD PRESSURE: 131 MMHG | BODY MASS INDEX: 23.88 KG/M2 | HEIGHT: 66 IN

## 2020-10-15 DIAGNOSIS — E78.2 MIXED HYPERLIPIDEMIA: Primary | ICD-10-CM

## 2020-10-15 DIAGNOSIS — R07.89 ATYPICAL CHEST PAIN: ICD-10-CM

## 2020-10-15 DIAGNOSIS — I48.91 NEW ONSET ATRIAL FIBRILLATION: ICD-10-CM

## 2020-10-15 DIAGNOSIS — I48.0 PAROXYSMAL ATRIAL FIBRILLATION: ICD-10-CM

## 2020-10-15 PROCEDURE — 3008F BODY MASS INDEX DOCD: CPT | Mod: CPTII,S$GLB,, | Performed by: INTERNAL MEDICINE

## 2020-10-15 PROCEDURE — 3008F PR BODY MASS INDEX (BMI) DOCUMENTED: ICD-10-PCS | Mod: CPTII,S$GLB,, | Performed by: INTERNAL MEDICINE

## 2020-10-15 PROCEDURE — 99204 PR OFFICE/OUTPT VISIT, NEW, LEVL IV, 45-59 MIN: ICD-10-PCS | Mod: 25,S$GLB,, | Performed by: INTERNAL MEDICINE

## 2020-10-15 PROCEDURE — 99999 PR PBB SHADOW E&M-EST. PATIENT-LVL III: CPT | Mod: PBBFAC,,, | Performed by: INTERNAL MEDICINE

## 2020-10-15 PROCEDURE — 3075F PR MOST RECENT SYSTOLIC BLOOD PRESS GE 130-139MM HG: ICD-10-PCS | Mod: CPTII,S$GLB,, | Performed by: INTERNAL MEDICINE

## 2020-10-15 PROCEDURE — 3079F DIAST BP 80-89 MM HG: CPT | Mod: CPTII,S$GLB,, | Performed by: INTERNAL MEDICINE

## 2020-10-15 PROCEDURE — 3079F PR MOST RECENT DIASTOLIC BLOOD PRESSURE 80-89 MM HG: ICD-10-PCS | Mod: CPTII,S$GLB,, | Performed by: INTERNAL MEDICINE

## 2020-10-15 PROCEDURE — 3075F SYST BP GE 130 - 139MM HG: CPT | Mod: CPTII,S$GLB,, | Performed by: INTERNAL MEDICINE

## 2020-10-15 PROCEDURE — 99204 OFFICE O/P NEW MOD 45 MIN: CPT | Mod: 25,S$GLB,, | Performed by: INTERNAL MEDICINE

## 2020-10-15 PROCEDURE — 99999 PR PBB SHADOW E&M-EST. PATIENT-LVL III: ICD-10-PCS | Mod: PBBFAC,,, | Performed by: INTERNAL MEDICINE

## 2020-10-15 RX ORDER — METOPROLOL SUCCINATE 25 MG/1
25 TABLET, EXTENDED RELEASE ORAL DAILY
Qty: 90 TABLET | Refills: 3 | Status: SHIPPED | OUTPATIENT
Start: 2020-10-15 | End: 2020-11-02

## 2020-10-15 NOTE — LETTER
October 15, 2020      [place]  [address] [address]  [facility] [address]  [facility] [address]           [facility]  [address] [place] [address]  Phone: [phone]          Patient: Tamica Cardona   MR Number: [id number]   YOB: 1955   Date of Visit: 10/15/2020       Dear Tamica Kent:    Thank you for referring aTmica Cardona to me for evaluation. Attached you will find relevant portions of my assessment and plan of care.    If you have questions, please do not hesitate to call me. I look forward to following Tamica Cardona along with you.    Sincerely,    Collin Roberts MD    Enclosure  CC:  No Recipients    If you would like to receive this communication electronically, please contact [email or web address] or [phone] to request more information on [facility] access.    For providers and/or their staff who would like to refer a patient to [facility], please contact us through our one-stop-shop provider referral line, [facility], at [phone].    If you feel you have received this communication in error or would no longer like to receive these types of communications, please e-mail [email or web address]

## 2020-10-19 ENCOUNTER — TELEPHONE (OUTPATIENT)
Dept: CARDIOLOGY | Facility: CLINIC | Age: 65
End: 2020-10-19

## 2020-10-19 DIAGNOSIS — I48.0 PAROXYSMAL ATRIAL FIBRILLATION: Primary | ICD-10-CM

## 2020-10-19 DIAGNOSIS — I48.91 NEW ONSET ATRIAL FIBRILLATION: ICD-10-CM

## 2020-10-19 RX ORDER — FLECAINIDE ACETATE 100 MG/1
100 TABLET ORAL EVERY 12 HOURS
Qty: 60 TABLET | Refills: 3 | Status: SHIPPED | OUTPATIENT
Start: 2020-10-19 | End: 2021-02-04

## 2020-10-22 ENCOUNTER — CLINICAL SUPPORT (OUTPATIENT)
Dept: CARDIOLOGY | Facility: CLINIC | Age: 65
End: 2020-10-22
Attending: INTERNAL MEDICINE
Payer: COMMERCIAL

## 2020-10-22 DIAGNOSIS — I48.0 PAROXYSMAL ATRIAL FIBRILLATION: ICD-10-CM

## 2020-10-22 PROCEDURE — 93224 HOLTER MONITOR - 48 HOUR (CUPID ONLY): ICD-10-PCS | Mod: S$GLB,,, | Performed by: INTERNAL MEDICINE

## 2020-10-22 PROCEDURE — 93224 XTRNL ECG REC UP TO 48 HRS: CPT | Mod: S$GLB,,, | Performed by: INTERNAL MEDICINE

## 2020-10-23 ENCOUNTER — PATIENT MESSAGE (OUTPATIENT)
Dept: CARDIOLOGY | Facility: CLINIC | Age: 65
End: 2020-10-23

## 2020-11-02 ENCOUNTER — OFFICE VISIT (OUTPATIENT)
Dept: CARDIOLOGY | Facility: CLINIC | Age: 65
End: 2020-11-02
Payer: MEDICARE

## 2020-11-02 VITALS
HEIGHT: 66 IN | BODY MASS INDEX: 23.74 KG/M2 | WEIGHT: 147.69 LBS | HEART RATE: 60 BPM | DIASTOLIC BLOOD PRESSURE: 79 MMHG | SYSTOLIC BLOOD PRESSURE: 138 MMHG

## 2020-11-02 DIAGNOSIS — E78.2 MIXED HYPERLIPIDEMIA: ICD-10-CM

## 2020-11-02 DIAGNOSIS — I48.0 PAROXYSMAL A-FIB: Primary | ICD-10-CM

## 2020-11-02 DIAGNOSIS — I48.0 PAROXYSMAL ATRIAL FIBRILLATION: Primary | ICD-10-CM

## 2020-11-02 PROCEDURE — 99214 OFFICE O/P EST MOD 30 MIN: CPT | Mod: S$PBB,,, | Performed by: INTERNAL MEDICINE

## 2020-11-02 PROCEDURE — 99999 PR PBB SHADOW E&M-EST. PATIENT-LVL III: ICD-10-PCS | Mod: PBBFAC,,, | Performed by: INTERNAL MEDICINE

## 2020-11-02 PROCEDURE — 93010 EKG 12-LEAD: ICD-10-PCS | Mod: S$PBB,,, | Performed by: INTERNAL MEDICINE

## 2020-11-02 PROCEDURE — 93010 ELECTROCARDIOGRAM REPORT: CPT | Mod: S$PBB,,, | Performed by: INTERNAL MEDICINE

## 2020-11-02 PROCEDURE — 99213 OFFICE O/P EST LOW 20 MIN: CPT | Mod: PBBFAC,PO,25 | Performed by: INTERNAL MEDICINE

## 2020-11-02 PROCEDURE — 93005 ELECTROCARDIOGRAM TRACING: CPT | Mod: PBBFAC,PO | Performed by: INTERNAL MEDICINE

## 2020-11-02 PROCEDURE — 99999 PR PBB SHADOW E&M-EST. PATIENT-LVL III: CPT | Mod: PBBFAC,,, | Performed by: INTERNAL MEDICINE

## 2020-11-02 PROCEDURE — 99214 PR OFFICE/OUTPT VISIT, EST, LEVL IV, 30-39 MIN: ICD-10-PCS | Mod: S$PBB,,, | Performed by: INTERNAL MEDICINE

## 2020-11-02 RX ORDER — METOPROLOL SUCCINATE 25 MG/1
12.5 TABLET, EXTENDED RELEASE ORAL DAILY
Qty: 45 TABLET | Refills: 3 | Status: SHIPPED | OUTPATIENT
Start: 2020-11-02 | End: 2021-08-27

## 2020-11-02 NOTE — PROGRESS NOTES
Subjective:    Patient ID:  Tamica Cardona is a 64 y.o. female who presents for follow-up of Atrial Fibrillation (Re-assess EKG intervals - initiated Metoprolol and flecainide on 10/26/20.  Pt states she is doing much better but states she feels fatigued ) and Results (Review holter, nuc)      Problem List Items Addressed This Visit        Cardiac/Vascular    Mixed hyperlipidemia    Paroxysmal atrial fibrillation - Primary          HPI    Patient was last seen on 10/15/2020 at which time she was evaluated for presumed episodes of paroxysmal atrial fibrillation.  She was continued on Eliquis and a Holter monitor was arranged.  Flecainide/metoprolol was also initiated with goals of rhythm control.  Nuclear stress test was ordered that showed no evidence of reversible ischemia.  Holter monitor is pending at this time.    On assessment today, the patient states that she is feeling much better, no more episodes of palpitations/CP. Slightly fatigued since starting on AV raj agents    No chest pain.  No shortness of breath.  Has not been back to gym in some time.    Further episodes of atrial fibrillation - No symptoms of them  Today's EKG shows sinus rhythm, intervals OK      Review of Systems   Constitution: Negative for decreased appetite, fever and malaise/fatigue.   Eyes: Negative for blurred vision.   Cardiovascular: Negative for chest pain, dyspnea on exertion, irregular heartbeat and leg swelling.   Respiratory: Negative for cough, hemoptysis, shortness of breath and wheezing.    Endocrine: Negative for cold intolerance and heat intolerance.   Hematologic/Lymphatic: Negative for bleeding problem.   Musculoskeletal: Negative for muscle weakness and myalgias.   Gastrointestinal: Negative for abdominal pain, constipation and diarrhea.   Genitourinary: Negative for bladder incontinence.   Neurological: Negative for dizziness and weakness.   Psychiatric/Behavioral: Negative for depression.        Objective:  "    Vitals:    11/02/20 0942   BP: 138/79   BP Location: Left arm   Patient Position: Sitting   BP Method: Medium (Automatic)   Pulse: 60   Weight: 67 kg (147 lb 11.3 oz)   Height: 5' 5.5" (1.664 m)        Physical Exam   Constitutional: She is oriented to person, place, and time. She appears well-developed and well-nourished. No distress.   HENT:   Head: Normocephalic and atraumatic.   Neck: Neck supple. No JVD present.   Cardiovascular: Normal rate, regular rhythm and normal heart sounds. Exam reveals no gallop and no friction rub.   No murmur heard.  Pulmonary/Chest: Effort normal and breath sounds normal. No respiratory distress. She has no wheezes. She has no rales.   Abdominal: Soft. Bowel sounds are normal. There is no abdominal tenderness. There is no rebound and no guarding.   Musculoskeletal:         General: No tenderness or edema.   Neurological: She is alert and oriented to person, place, and time.   Skin: Skin is warm and dry.   Psychiatric: Her behavior is normal.   Nursing note and vitals reviewed.          Current Outpatient Medications on File Prior to Visit   Medication Sig    acetaminophen (TYLENOL EXTRA STRENGTH) 500 MG tablet Take 750 mg by mouth every 6 (six) hours as needed for Pain.    apixaban (ELIQUIS) 5 mg Tab Take 1 tablet (5 mg total) by mouth 2 (two) times daily.    calcium carbonate (OS-DESTINY) 600 mg (1,500 mg) Tab Take 600 mg by mouth 2 (two) times daily with meals.    escitalopram oxalate (LEXAPRO) 10 MG tablet Take 1 tablet (10 mg total) by mouth once daily.    ferrous gluconate (FERGON) 325 MG tablet Take 45 mg by mouth daily with breakfast.     flecainide (TAMBOCOR) 100 MG Tab Take 1 tablet (100 mg total) by mouth every 12 (twelve) hours.    loratadine (CLARITIN) 10 mg tablet     meclizine (ANTIVERT) 25 mg tablet Take 1 tablet (25 mg total) by mouth 3 (three) times daily as needed.    melatonin 5 mg Cap     metoprolol succinate (TOPROL-XL) 25 MG 24 hr tablet Take 1 tablet " (25 mg total) by mouth once daily.    mometasone (ELOCON) 0.1 % ointment aaa bid x 1-2 weeks then prn    omega 3-dha-epa-fish oil 500-100-1,000 mg Cap     phenazopyridine (PYRIDIUM) 200 MG tablet Take 1 tablet (200 mg total) by mouth every 6 (six) hours as needed.    pseudoephedrine (SUDAFED) 60 MG tablet     vitamin D (VITAMIN D3) 1000 units Tab Take 1,000 Units by mouth once daily.    aspirin (ECOTRIN) 325 MG EC tablet Take 650 mg by mouth every 6 (six) hours as needed for Pain.     Current Facility-Administered Medications on File Prior to Visit   Medication    aminophylline injection 75 mg       Lipid Panel:   Lab Results   Component Value Date    CHOL 238 (H) 10/05/2020    HDL 51 10/05/2020    LDLCALC 152.8 10/05/2020    TRIG 171 (H) 10/05/2020    CHOLHDL 21.4 10/05/2020       The 10-year ASCVD risk score ([place] MAG[place] Jr., et al., 2013) is: 6.6%    Values used to calculate the score:      Age: 64 years      Sex: Female      Is Non- : No      Diabetic: No      Tobacco smoker: No      Systolic Blood Pressure: 138 mmHg      Is BP treated: No      HDL Cholesterol: 51 mg/dL      Total Cholesterol: 238 mg/dL    All pertinent labs, imaging, and EKGs reviewed.  Patient's most recent EKG tracing was personally interpreted by this provider.    Assessment:       1. Paroxysmal atrial fibrillation    2. Mixed hyperlipidemia         Plan:     Symptoms OK, but with some fatigue  BP/Pulse OK today    Continue flecainide 100 mg PO BID   Reduce metoprolol succinate to 12.5 mg PO daily to combat fatigue   Continue Eliquis 5 mg PO BID  Will follow-up Holter monitor     Continue other cardiac medications  Mediterranean Diet/Cardiovascular Exercise Program    Patient queried and all questions were answered.    F/u in 3 months to reassess fatigue symptoms on new dose of metoprolol and after she gets back to exercising      Signed:    Collin Roberts MD  11/2/2020 7:39 AM

## 2020-11-04 ENCOUNTER — PATIENT MESSAGE (OUTPATIENT)
Dept: CARDIOLOGY | Facility: CLINIC | Age: 65
End: 2020-11-04

## 2020-11-04 DIAGNOSIS — I48.0 PAROXYSMAL ATRIAL FIBRILLATION: Primary | ICD-10-CM

## 2020-11-04 LAB
OHS CV EVENT MONITOR DAY: 0
OHS CV HOLTER LENGTH DECIMAL HOURS: 48
OHS CV HOLTER LENGTH HOURS: 48
OHS CV HOLTER LENGTH MINUTES: 0

## 2020-11-05 ENCOUNTER — PATIENT MESSAGE (OUTPATIENT)
Dept: CARDIOLOGY | Facility: CLINIC | Age: 65
End: 2020-11-05

## 2020-12-07 ENCOUNTER — HOSPITAL ENCOUNTER (OUTPATIENT)
Dept: RADIOLOGY | Facility: HOSPITAL | Age: 65
Discharge: HOME | End: 2020-12-07
Attending: NURSE PRACTITIONER
Payer: MEDICARE

## 2020-12-07 DIAGNOSIS — Z12.31 SCREENING MAMMOGRAM, ENCOUNTER FOR: ICD-10-CM

## 2020-12-07 DIAGNOSIS — Z12.39 ENCOUNTER FOR SCREENING FOR MALIGNANT NEOPLASM OF BREAST, UNSPECIFIED SCREENING MODALITY: ICD-10-CM

## 2020-12-07 PROCEDURE — 77067 MAMMO DIGITAL SCREENING LEFT WITH TOMO: ICD-10-PCS | Mod: 26,,, | Performed by: RADIOLOGY

## 2020-12-07 PROCEDURE — 77067 SCR MAMMO BI INCL CAD: CPT | Mod: TC,PO

## 2020-12-07 PROCEDURE — 77063 BREAST TOMOSYNTHESIS BI: CPT | Mod: 26,,, | Performed by: RADIOLOGY

## 2020-12-07 PROCEDURE — 77063 MAMMO DIGITAL SCREENING LEFT WITH TOMO: ICD-10-PCS | Mod: 26,,, | Performed by: RADIOLOGY

## 2020-12-07 PROCEDURE — 77067 SCR MAMMO BI INCL CAD: CPT | Mod: 26,,, | Performed by: RADIOLOGY

## 2020-12-30 ENCOUNTER — PATIENT MESSAGE (OUTPATIENT)
Dept: FAMILY MEDICINE | Facility: CLINIC | Age: 65
End: 2020-12-30

## 2020-12-30 DIAGNOSIS — M79.673 HEEL PAIN, UNSPECIFIED LATERALITY: Primary | ICD-10-CM

## 2020-12-31 ENCOUNTER — PATIENT MESSAGE (OUTPATIENT)
Dept: FAMILY MEDICINE | Facility: CLINIC | Age: 65
End: 2020-12-31

## 2021-01-14 ENCOUNTER — PATIENT MESSAGE (OUTPATIENT)
Dept: FAMILY MEDICINE | Facility: CLINIC | Age: 66
End: 2021-01-14

## 2021-01-20 ENCOUNTER — OFFICE VISIT (OUTPATIENT)
Dept: PODIATRY | Facility: CLINIC | Age: 66
End: 2021-01-20
Payer: MEDICARE

## 2021-01-20 ENCOUNTER — HOSPITAL ENCOUNTER (OUTPATIENT)
Dept: RADIOLOGY | Facility: HOSPITAL | Age: 66
Discharge: HOME | End: 2021-01-20
Attending: PODIATRIST
Payer: MEDICARE

## 2021-01-20 VITALS
SYSTOLIC BLOOD PRESSURE: 130 MMHG | HEIGHT: 66 IN | BODY MASS INDEX: 23.74 KG/M2 | DIASTOLIC BLOOD PRESSURE: 82 MMHG | WEIGHT: 147.69 LBS | HEART RATE: 66 BPM

## 2021-01-20 DIAGNOSIS — M79.671 PAIN IN RIGHT FOOT: Primary | ICD-10-CM

## 2021-01-20 DIAGNOSIS — M24.573 EQUINUS CONTRACTURE OF ANKLE: ICD-10-CM

## 2021-01-20 DIAGNOSIS — M79.671 PAIN IN RIGHT FOOT: ICD-10-CM

## 2021-01-20 DIAGNOSIS — M77.31 CALCANEAL SPUR, RIGHT FOOT: ICD-10-CM

## 2021-01-20 DIAGNOSIS — M79.673 HEEL PAIN, UNSPECIFIED LATERALITY: ICD-10-CM

## 2021-01-20 DIAGNOSIS — M72.2 PLANTAR FASCIITIS: Primary | ICD-10-CM

## 2021-01-20 PROCEDURE — 99204 OFFICE O/P NEW MOD 45 MIN: CPT | Mod: S$PBB,,, | Performed by: PODIATRIST

## 2021-01-20 PROCEDURE — 99215 OFFICE O/P EST HI 40 MIN: CPT | Mod: PBBFAC,25,PN | Performed by: PODIATRIST

## 2021-01-20 PROCEDURE — 99999 PR PBB SHADOW E&M-EST. PATIENT-LVL V: CPT | Mod: PBBFAC,,, | Performed by: PODIATRIST

## 2021-01-20 PROCEDURE — 99999 PR PBB SHADOW E&M-EST. PATIENT-LVL V: ICD-10-PCS | Mod: PBBFAC,,, | Performed by: PODIATRIST

## 2021-01-20 PROCEDURE — 73630 X-RAY EXAM OF FOOT: CPT | Mod: 26,RT,, | Performed by: RADIOLOGY

## 2021-01-20 PROCEDURE — 73630 X-RAY EXAM OF FOOT: CPT | Mod: TC,PO,RT

## 2021-01-20 PROCEDURE — 99204 PR OFFICE/OUTPT VISIT, NEW, LEVL IV, 45-59 MIN: ICD-10-PCS | Mod: S$PBB,,, | Performed by: PODIATRIST

## 2021-01-20 PROCEDURE — 73630 XR FOOT COMPLETE 3 VIEW RIGHT: ICD-10-PCS | Mod: 26,RT,, | Performed by: RADIOLOGY

## 2021-02-04 ENCOUNTER — OFFICE VISIT (OUTPATIENT)
Dept: CARDIOLOGY | Facility: CLINIC | Age: 66
End: 2021-02-04
Payer: MEDICARE

## 2021-02-04 VITALS
HEART RATE: 58 BPM | SYSTOLIC BLOOD PRESSURE: 118 MMHG | DIASTOLIC BLOOD PRESSURE: 64 MMHG | HEIGHT: 65 IN | OXYGEN SATURATION: 97 % | BODY MASS INDEX: 25.01 KG/M2 | WEIGHT: 150.13 LBS

## 2021-02-04 DIAGNOSIS — I48.91 ATRIAL FIBRILLATION, UNSPECIFIED TYPE: Primary | ICD-10-CM

## 2021-02-04 DIAGNOSIS — I48.91 NEW ONSET ATRIAL FIBRILLATION: ICD-10-CM

## 2021-02-04 DIAGNOSIS — E78.2 MIXED HYPERLIPIDEMIA: ICD-10-CM

## 2021-02-04 DIAGNOSIS — Z20.822 ENCOUNTER FOR LABORATORY TESTING FOR COVID-19 VIRUS: ICD-10-CM

## 2021-02-04 DIAGNOSIS — Z01.818 PRE-OP TESTING: ICD-10-CM

## 2021-02-04 DIAGNOSIS — I48.0 PAROXYSMAL ATRIAL FIBRILLATION: Primary | ICD-10-CM

## 2021-02-04 PROCEDURE — 99215 OFFICE O/P EST HI 40 MIN: CPT | Mod: S$PBB,,, | Performed by: INTERNAL MEDICINE

## 2021-02-04 PROCEDURE — 99214 OFFICE O/P EST MOD 30 MIN: CPT | Mod: PBBFAC,PO | Performed by: INTERNAL MEDICINE

## 2021-02-04 PROCEDURE — 99999 PR PBB SHADOW E&M-EST. PATIENT-LVL IV: ICD-10-PCS | Mod: PBBFAC,,, | Performed by: INTERNAL MEDICINE

## 2021-02-04 PROCEDURE — 99215 PR OFFICE/OUTPT VISIT, EST, LEVL V, 40-54 MIN: ICD-10-PCS | Mod: S$PBB,,, | Performed by: INTERNAL MEDICINE

## 2021-02-04 PROCEDURE — 99999 PR PBB SHADOW E&M-EST. PATIENT-LVL IV: CPT | Mod: PBBFAC,,, | Performed by: INTERNAL MEDICINE

## 2021-02-04 RX ORDER — FLECAINIDE ACETATE 50 MG/1
50 TABLET ORAL EVERY 12 HOURS
Qty: 180 TABLET | Refills: 3 | Status: SHIPPED | OUTPATIENT
Start: 2021-02-04 | End: 2022-03-07

## 2021-02-05 ENCOUNTER — LAB VISIT (OUTPATIENT)
Dept: FAMILY MEDICINE | Facility: CLINIC | Age: 66
End: 2021-02-05
Payer: MEDICARE

## 2021-02-05 DIAGNOSIS — Z20.822 ENCOUNTER FOR LABORATORY TESTING FOR COVID-19 VIRUS: ICD-10-CM

## 2021-02-05 DIAGNOSIS — Z01.818 PRE-OP TESTING: ICD-10-CM

## 2021-02-05 PROCEDURE — U0003 INFECTIOUS AGENT DETECTION BY NUCLEIC ACID (DNA OR RNA); SEVERE ACUTE RESPIRATORY SYNDROME CORONAVIRUS 2 (SARS-COV-2) (CORONAVIRUS DISEASE [COVID-19]), AMPLIFIED PROBE TECHNIQUE, MAKING USE OF HIGH THROUGHPUT TECHNOLOGIES AS DESCRIBED BY CMS-2020-01-R: HCPCS

## 2021-02-06 LAB — SARS-COV-2 RNA RESP QL NAA+PROBE: NOT DETECTED

## 2021-02-08 ENCOUNTER — DOCUMENTATION ONLY (OUTPATIENT)
Dept: CARDIOLOGY | Facility: CLINIC | Age: 66
End: 2021-02-08

## 2021-02-08 DIAGNOSIS — Z95.818 STATUS POST PLACEMENT OF IMPLANTABLE LOOP RECORDER: Primary | ICD-10-CM

## 2021-02-08 DIAGNOSIS — I48.91 ATRIAL FIBRILLATION, UNSPECIFIED TYPE: ICD-10-CM

## 2021-02-15 ENCOUNTER — CLINICAL SUPPORT (OUTPATIENT)
Dept: CARDIOLOGY | Facility: CLINIC | Age: 66
End: 2021-02-15
Attending: INTERNAL MEDICINE
Payer: MEDICARE

## 2021-02-15 DIAGNOSIS — Z95.818 STATUS POST PLACEMENT OF IMPLANTABLE LOOP RECORDER: ICD-10-CM

## 2021-02-15 DIAGNOSIS — I48.91 ATRIAL FIBRILLATION, UNSPECIFIED TYPE: ICD-10-CM

## 2021-02-15 PROCEDURE — 93291 CARDIAC DEVICE CHECK - IN CLINIC & HOSPITAL: ICD-10-PCS | Mod: 26,S$PBB,, | Performed by: INTERNAL MEDICINE

## 2021-02-15 PROCEDURE — 93291 INTERROG DEV EVAL SCRMS IP: CPT | Mod: PBBFAC,PO | Performed by: INTERNAL MEDICINE

## 2021-02-22 ENCOUNTER — OFFICE VISIT (OUTPATIENT)
Dept: PODIATRY | Facility: CLINIC | Age: 66
End: 2021-02-22
Payer: MEDICARE

## 2021-02-22 VITALS
BODY MASS INDEX: 24.98 KG/M2 | DIASTOLIC BLOOD PRESSURE: 74 MMHG | WEIGHT: 149.94 LBS | SYSTOLIC BLOOD PRESSURE: 132 MMHG | HEART RATE: 63 BPM | HEIGHT: 65 IN

## 2021-02-22 DIAGNOSIS — M24.573 EQUINUS CONTRACTURE OF ANKLE: ICD-10-CM

## 2021-02-22 DIAGNOSIS — M79.671 PAIN IN RIGHT FOOT: ICD-10-CM

## 2021-02-22 DIAGNOSIS — M72.2 PLANTAR FASCIITIS: Primary | ICD-10-CM

## 2021-02-22 PROBLEM — M77.31 CALCANEAL SPUR, RIGHT FOOT: Status: ACTIVE | Noted: 2021-02-22

## 2021-02-22 PROBLEM — M79.673 HEEL PAIN: Status: ACTIVE | Noted: 2021-02-22

## 2021-02-22 PROCEDURE — 99999 PR PBB SHADOW E&M-EST. PATIENT-LVL IV: ICD-10-PCS | Mod: PBBFAC,,, | Performed by: PODIATRIST

## 2021-02-22 PROCEDURE — 99213 OFFICE O/P EST LOW 20 MIN: CPT | Mod: S$PBB,,, | Performed by: PODIATRIST

## 2021-02-22 PROCEDURE — 99213 PR OFFICE/OUTPT VISIT, EST, LEVL III, 20-29 MIN: ICD-10-PCS | Mod: S$PBB,,, | Performed by: PODIATRIST

## 2021-02-22 PROCEDURE — 99999 PR PBB SHADOW E&M-EST. PATIENT-LVL IV: CPT | Mod: PBBFAC,,, | Performed by: PODIATRIST

## 2021-02-22 PROCEDURE — 99214 OFFICE O/P EST MOD 30 MIN: CPT | Mod: PBBFAC,PN | Performed by: PODIATRIST

## 2021-03-09 ENCOUNTER — CLINICAL SUPPORT (OUTPATIENT)
Dept: CARDIOLOGY | Facility: CLINIC | Age: 66
End: 2021-03-09
Payer: MEDICARE

## 2021-03-09 DIAGNOSIS — Z95.818 PRESENCE OF OTHER CARDIAC IMPLANTS AND GRAFTS: ICD-10-CM

## 2021-03-09 PROCEDURE — 93298 REM INTERROG DEV EVAL SCRMS: CPT | Mod: ,,, | Performed by: INTERNAL MEDICINE

## 2021-03-09 PROCEDURE — 93298 CARDIAC DEVICE CHECK - REMOTE: ICD-10-PCS | Mod: ,,, | Performed by: INTERNAL MEDICINE

## 2021-03-27 PROBLEM — M79.671 PAIN IN RIGHT FOOT: Status: ACTIVE | Noted: 2021-03-27

## 2021-04-08 ENCOUNTER — CLINICAL SUPPORT (OUTPATIENT)
Dept: CARDIOLOGY | Facility: CLINIC | Age: 66
End: 2021-04-08
Payer: MEDICARE

## 2021-04-08 DIAGNOSIS — Z95.818 PRESENCE OF OTHER CARDIAC IMPLANTS AND GRAFTS: ICD-10-CM

## 2021-04-08 PROCEDURE — 93298 REM INTERROG DEV EVAL SCRMS: CPT | Mod: ,,, | Performed by: INTERNAL MEDICINE

## 2021-04-08 PROCEDURE — 93298 CARDIAC DEVICE CHECK - REMOTE: ICD-10-PCS | Mod: ,,, | Performed by: INTERNAL MEDICINE

## 2021-04-19 ENCOUNTER — PATIENT MESSAGE (OUTPATIENT)
Dept: CARDIOLOGY | Facility: CLINIC | Age: 66
End: 2021-04-19

## 2021-05-08 ENCOUNTER — CLINICAL SUPPORT (OUTPATIENT)
Dept: CARDIOLOGY | Facility: CLINIC | Age: 66
End: 2021-05-08
Payer: MEDICARE

## 2021-05-08 DIAGNOSIS — Z95.818 PRESENCE OF OTHER CARDIAC IMPLANTS AND GRAFTS: ICD-10-CM

## 2021-05-08 PROCEDURE — 93298 REM INTERROG DEV EVAL SCRMS: CPT | Mod: ,,, | Performed by: INTERNAL MEDICINE

## 2021-05-08 PROCEDURE — 93298 CARDIAC DEVICE CHECK - REMOTE: ICD-10-PCS | Mod: ,,, | Performed by: INTERNAL MEDICINE

## 2021-06-07 ENCOUNTER — HOSPITAL ENCOUNTER (OUTPATIENT)
Dept: CARDIOLOGY | Facility: HOSPITAL | Age: 66
Discharge: HOME | End: 2021-06-07
Payer: MEDICARE

## 2021-06-07 ENCOUNTER — CLINICAL SUPPORT (OUTPATIENT)
Dept: CARDIOLOGY | Facility: HOSPITAL | Age: 66
End: 2021-06-07
Payer: MEDICARE

## 2021-06-07 DIAGNOSIS — Z95.818 PRESENCE OF OTHER CARDIAC IMPLANTS AND GRAFTS: ICD-10-CM

## 2021-06-07 PROCEDURE — 93298 REM INTERROG DEV EVAL SCRMS: CPT | Mod: ,,, | Performed by: INTERNAL MEDICINE

## 2021-06-07 PROCEDURE — 93298 CARDIAC DEVICE CHECK - REMOTE: ICD-10-PCS | Mod: ,,, | Performed by: INTERNAL MEDICINE

## 2021-06-29 ENCOUNTER — PATIENT MESSAGE (OUTPATIENT)
Dept: CARDIOLOGY | Facility: CLINIC | Age: 66
End: 2021-06-29

## 2021-07-07 ENCOUNTER — HOSPITAL ENCOUNTER (OUTPATIENT)
Dept: CARDIOLOGY | Facility: HOSPITAL | Age: 66
Discharge: HOME | End: 2021-07-07
Payer: MEDICARE

## 2021-07-07 ENCOUNTER — CLINICAL SUPPORT (OUTPATIENT)
Dept: CARDIOLOGY | Facility: HOSPITAL | Age: 66
End: 2021-07-07
Payer: MEDICARE

## 2021-07-07 ENCOUNTER — TELEPHONE (OUTPATIENT)
Dept: CARDIOLOGY | Facility: HOSPITAL | Age: 66
End: 2021-07-07

## 2021-07-07 DIAGNOSIS — Z95.818 PRESENCE OF OTHER CARDIAC IMPLANTS AND GRAFTS: ICD-10-CM

## 2021-07-07 PROCEDURE — 93298 REM INTERROG DEV EVAL SCRMS: CPT | Mod: ,,, | Performed by: INTERNAL MEDICINE

## 2021-07-07 PROCEDURE — 93298 CARDIAC DEVICE CHECK - REMOTE: ICD-10-PCS | Mod: ,,, | Performed by: INTERNAL MEDICINE

## 2021-07-08 DIAGNOSIS — I48.91 ATRIAL FIBRILLATION, UNSPECIFIED TYPE: Primary | ICD-10-CM

## 2021-07-09 ENCOUNTER — TELEPHONE (OUTPATIENT)
Dept: PHARMACY | Facility: CLINIC | Age: 66
End: 2021-07-09

## 2021-07-09 ENCOUNTER — PATIENT MESSAGE (OUTPATIENT)
Dept: CARDIOLOGY | Facility: CLINIC | Age: 66
End: 2021-07-09

## 2021-07-14 ENCOUNTER — TELEPHONE (OUTPATIENT)
Dept: PHARMACY | Facility: AMBULARY SURGERY CENTER | Age: 66
End: 2021-07-14

## 2021-08-04 PROBLEM — I48.91 ATRIAL FIBRILLATION: Status: RESOLVED | Noted: 2021-02-04 | Resolved: 2021-08-04

## 2021-08-06 ENCOUNTER — TELEPHONE (OUTPATIENT)
Dept: CARDIOLOGY | Facility: CLINIC | Age: 66
End: 2021-08-06

## 2021-08-06 ENCOUNTER — CLINICAL SUPPORT (OUTPATIENT)
Dept: CARDIOLOGY | Facility: HOSPITAL | Age: 66
End: 2021-08-06
Payer: MEDICARE

## 2021-08-06 DIAGNOSIS — Z95.818 PRESENCE OF OTHER CARDIAC IMPLANTS AND GRAFTS: ICD-10-CM

## 2021-08-06 PROCEDURE — 93298 CARDIAC DEVICE CHECK - REMOTE: ICD-10-PCS | Mod: ,,, | Performed by: INTERNAL MEDICINE

## 2021-08-06 PROCEDURE — 93298 REM INTERROG DEV EVAL SCRMS: CPT | Mod: ,,, | Performed by: INTERNAL MEDICINE

## 2021-08-11 ENCOUNTER — PATIENT MESSAGE (OUTPATIENT)
Dept: CARDIOLOGY | Facility: CLINIC | Age: 66
End: 2021-08-11

## 2021-08-24 ENCOUNTER — OFFICE VISIT (OUTPATIENT)
Dept: OPTOMETRY | Facility: CLINIC | Age: 66
End: 2021-08-24
Payer: MEDICARE

## 2021-08-24 DIAGNOSIS — H31.003 CHORIORETINAL SCAR, BILATERAL: ICD-10-CM

## 2021-08-24 DIAGNOSIS — H25.13 NUCLEAR SCLEROSIS, BILATERAL: Primary | ICD-10-CM

## 2021-08-24 DIAGNOSIS — H52.203 MYOPIA WITH ASTIGMATISM AND PRESBYOPIA, BILATERAL: ICD-10-CM

## 2021-08-24 DIAGNOSIS — H52.4 MYOPIA WITH ASTIGMATISM AND PRESBYOPIA, BILATERAL: ICD-10-CM

## 2021-08-24 DIAGNOSIS — H52.13 MYOPIA WITH ASTIGMATISM AND PRESBYOPIA, BILATERAL: ICD-10-CM

## 2021-08-24 DIAGNOSIS — Z13.5 GLAUCOMA SCREENING: ICD-10-CM

## 2021-08-24 PROCEDURE — 92014 COMPRE OPH EXAM EST PT 1/>: CPT | Mod: S$PBB,,, | Performed by: OPTOMETRIST

## 2021-08-24 PROCEDURE — 99999 PR PBB SHADOW E&M-EST. PATIENT-LVL III: CPT | Mod: PBBFAC,,, | Performed by: OPTOMETRIST

## 2021-08-24 PROCEDURE — 92014 PR EYE EXAM, EST PATIENT,COMPREHESV: ICD-10-PCS | Mod: S$PBB,,, | Performed by: OPTOMETRIST

## 2021-08-24 PROCEDURE — 99999 PR PBB SHADOW E&M-EST. PATIENT-LVL III: ICD-10-PCS | Mod: PBBFAC,,, | Performed by: OPTOMETRIST

## 2021-08-24 PROCEDURE — 99213 OFFICE O/P EST LOW 20 MIN: CPT | Mod: PBBFAC,PO | Performed by: OPTOMETRIST

## 2021-08-27 ENCOUNTER — OFFICE VISIT (OUTPATIENT)
Dept: CARDIOLOGY | Facility: CLINIC | Age: 66
End: 2021-08-27
Payer: MEDICARE

## 2021-08-27 VITALS
HEART RATE: 63 BPM | HEIGHT: 65 IN | BODY MASS INDEX: 25.3 KG/M2 | DIASTOLIC BLOOD PRESSURE: 72 MMHG | WEIGHT: 151.88 LBS | SYSTOLIC BLOOD PRESSURE: 145 MMHG

## 2021-08-27 DIAGNOSIS — F51.8 OTHER SLEEP DISORDERS NOT DUE TO A SUBSTANCE OR KNOWN PHYSIOLOGICAL CONDITION: ICD-10-CM

## 2021-08-27 DIAGNOSIS — I48.0 PAROXYSMAL ATRIAL FIBRILLATION: ICD-10-CM

## 2021-08-27 DIAGNOSIS — R06.83 SNORING: Primary | ICD-10-CM

## 2021-08-27 DIAGNOSIS — D50.9 IRON DEFICIENCY ANEMIA, UNSPECIFIED IRON DEFICIENCY ANEMIA TYPE: ICD-10-CM

## 2021-08-27 DIAGNOSIS — Z85.3 HISTORY OF BREAST CANCER: ICD-10-CM

## 2021-08-27 DIAGNOSIS — E78.2 MIXED HYPERLIPIDEMIA: ICD-10-CM

## 2021-08-27 PROCEDURE — 99214 OFFICE O/P EST MOD 30 MIN: CPT | Mod: PBBFAC,PO | Performed by: PHYSICIAN ASSISTANT

## 2021-08-27 PROCEDURE — 99214 PR OFFICE/OUTPT VISIT, EST, LEVL IV, 30-39 MIN: ICD-10-PCS | Mod: S$PBB,,, | Performed by: PHYSICIAN ASSISTANT

## 2021-08-27 PROCEDURE — 99999 PR PBB SHADOW E&M-EST. PATIENT-LVL IV: ICD-10-PCS | Mod: PBBFAC,,, | Performed by: PHYSICIAN ASSISTANT

## 2021-08-27 PROCEDURE — 99214 OFFICE O/P EST MOD 30 MIN: CPT | Mod: S$PBB,,, | Performed by: PHYSICIAN ASSISTANT

## 2021-08-27 PROCEDURE — 99999 PR PBB SHADOW E&M-EST. PATIENT-LVL IV: CPT | Mod: PBBFAC,,, | Performed by: PHYSICIAN ASSISTANT

## 2021-08-27 RX ORDER — METOPROLOL SUCCINATE 25 MG/1
25 TABLET, EXTENDED RELEASE ORAL DAILY
Qty: 90 TABLET | Refills: 3 | Status: SHIPPED | OUTPATIENT
Start: 2021-08-27 | End: 2022-03-07

## 2021-09-05 ENCOUNTER — CLINICAL SUPPORT (OUTPATIENT)
Dept: CARDIOLOGY | Facility: HOSPITAL | Age: 66
End: 2021-09-05
Payer: MEDICARE

## 2021-09-05 DIAGNOSIS — Z95.818 PRESENCE OF OTHER CARDIAC IMPLANTS AND GRAFTS: ICD-10-CM

## 2021-09-05 PROCEDURE — 93298 REM INTERROG DEV EVAL SCRMS: CPT | Mod: ,,, | Performed by: INTERNAL MEDICINE

## 2021-09-05 PROCEDURE — 93298 CARDIAC DEVICE CHECK - REMOTE: ICD-10-PCS | Mod: ,,, | Performed by: INTERNAL MEDICINE

## 2021-09-20 ENCOUNTER — PATIENT MESSAGE (OUTPATIENT)
Dept: CARDIOLOGY | Facility: CLINIC | Age: 66
End: 2021-09-20

## 2021-10-05 ENCOUNTER — CLINICAL SUPPORT (OUTPATIENT)
Dept: CARDIOLOGY | Facility: HOSPITAL | Age: 66
End: 2021-10-05
Payer: MEDICARE

## 2021-10-05 DIAGNOSIS — Z95.818 PRESENCE OF OTHER CARDIAC IMPLANTS AND GRAFTS: ICD-10-CM

## 2021-10-05 PROCEDURE — 93298 CARDIAC DEVICE CHECK - REMOTE: ICD-10-PCS | Mod: ,,, | Performed by: INTERNAL MEDICINE

## 2021-10-05 PROCEDURE — 93298 REM INTERROG DEV EVAL SCRMS: CPT | Mod: ,,, | Performed by: INTERNAL MEDICINE

## 2021-10-19 ENCOUNTER — OFFICE VISIT (OUTPATIENT)
Dept: FAMILY MEDICINE | Facility: CLINIC | Age: 66
End: 2021-10-19
Payer: MEDICARE

## 2021-10-19 VITALS
WEIGHT: 144.38 LBS | HEIGHT: 65 IN | HEART RATE: 56 BPM | DIASTOLIC BLOOD PRESSURE: 68 MMHG | SYSTOLIC BLOOD PRESSURE: 120 MMHG | RESPIRATION RATE: 14 BRPM | TEMPERATURE: 99 F | BODY MASS INDEX: 24.06 KG/M2 | OXYGEN SATURATION: 98 %

## 2021-10-19 DIAGNOSIS — F32.A DEPRESSION, UNSPECIFIED DEPRESSION TYPE: ICD-10-CM

## 2021-10-19 DIAGNOSIS — I48.0 PAROXYSMAL ATRIAL FIBRILLATION: ICD-10-CM

## 2021-10-19 DIAGNOSIS — N39.0 URINARY TRACT INFECTION WITH HEMATURIA, SITE UNSPECIFIED: ICD-10-CM

## 2021-10-19 DIAGNOSIS — Z00.00 ANNUAL PHYSICAL EXAM: Primary | ICD-10-CM

## 2021-10-19 DIAGNOSIS — R31.9 URINARY TRACT INFECTION WITH HEMATURIA, SITE UNSPECIFIED: ICD-10-CM

## 2021-10-19 DIAGNOSIS — Z78.0 POST-MENOPAUSAL: ICD-10-CM

## 2021-10-19 DIAGNOSIS — M62.9 DISORDER OF MUSCLE, UNSPECIFIED: ICD-10-CM

## 2021-10-19 DIAGNOSIS — Z85.3 HISTORY OF BREAST CANCER: ICD-10-CM

## 2021-10-19 DIAGNOSIS — E78.2 MIXED HYPERLIPIDEMIA: ICD-10-CM

## 2021-10-19 DIAGNOSIS — Z12.31 ENCOUNTER FOR SCREENING MAMMOGRAM FOR MALIGNANT NEOPLASM OF BREAST: ICD-10-CM

## 2021-10-19 DIAGNOSIS — R30.0 DYSURIA: ICD-10-CM

## 2021-10-19 DIAGNOSIS — D50.8 OTHER IRON DEFICIENCY ANEMIA: ICD-10-CM

## 2021-10-19 LAB
ALBUMIN SERPL BCP-MCNC: 3.9 G/DL (ref 3.5–5.2)
ALP SERPL-CCNC: 77 U/L (ref 55–135)
ALT SERPL W/O P-5'-P-CCNC: 23 U/L (ref 10–44)
ANION GAP SERPL CALC-SCNC: 7 MMOL/L (ref 8–16)
AST SERPL-CCNC: 28 U/L (ref 10–40)
BASOPHILS # BLD AUTO: 0.06 K/UL (ref 0–0.2)
BASOPHILS NFR BLD: 1.3 % (ref 0–1.9)
BILIRUB SERPL-MCNC: 1.2 MG/DL (ref 0.1–1)
BILIRUB SERPL-MCNC: NEGATIVE MG/DL
BLOOD URINE, POC: 50
BUN SERPL-MCNC: 12 MG/DL (ref 8–23)
CALCIUM SERPL-MCNC: 10 MG/DL (ref 8.7–10.5)
CHLORIDE SERPL-SCNC: 106 MMOL/L (ref 95–110)
CHOLEST SERPL-MCNC: 240 MG/DL (ref 120–199)
CHOLEST/HDLC SERPL: 4.6 {RATIO} (ref 2–5)
CLARITY, POC UA: CLEAR
CO2 SERPL-SCNC: 27 MMOL/L (ref 23–29)
COLOR, POC UA: ABNORMAL
CREAT SERPL-MCNC: 1 MG/DL (ref 0.5–1.4)
DIFFERENTIAL METHOD: ABNORMAL
EOSINOPHIL # BLD AUTO: 0.1 K/UL (ref 0–0.5)
EOSINOPHIL NFR BLD: 2.9 % (ref 0–8)
ERYTHROCYTE [DISTWIDTH] IN BLOOD BY AUTOMATED COUNT: 13.2 % (ref 11.5–14.5)
EST. GFR  (AFRICAN AMERICAN): >60 ML/MIN/1.73 M^2
EST. GFR  (NON AFRICAN AMERICAN): 59.3 ML/MIN/1.73 M^2
FERRITIN SERPL-MCNC: 132 NG/ML (ref 20–300)
GLUCOSE SERPL-MCNC: 87 MG/DL (ref 70–110)
GLUCOSE UR QL STRIP: NORMAL
HCT VFR BLD AUTO: 45.4 % (ref 37–48.5)
HDLC SERPL-MCNC: 52 MG/DL (ref 40–75)
HDLC SERPL: 21.7 % (ref 20–50)
HGB BLD-MCNC: 15.3 G/DL (ref 12–16)
IMM GRANULOCYTES # BLD AUTO: 0.01 K/UL (ref 0–0.04)
IMM GRANULOCYTES NFR BLD AUTO: 0.2 % (ref 0–0.5)
IRON SERPL-MCNC: 99 UG/DL (ref 30–160)
KETONES UR QL STRIP: NEGATIVE
LDLC SERPL CALC-MCNC: 163.4 MG/DL (ref 63–159)
LEUKOCYTE ESTERASE URINE, POC: ABNORMAL
LYMPHOCYTES # BLD AUTO: 1.2 K/UL (ref 1–4.8)
LYMPHOCYTES NFR BLD: 26.7 % (ref 18–48)
MCH RBC QN AUTO: 31.1 PG (ref 27–31)
MCHC RBC AUTO-ENTMCNC: 33.7 G/DL (ref 32–36)
MCV RBC AUTO: 92 FL (ref 82–98)
MONOCYTES # BLD AUTO: 0.4 K/UL (ref 0.3–1)
MONOCYTES NFR BLD: 8.1 % (ref 4–15)
NEUTROPHILS # BLD AUTO: 2.7 K/UL (ref 1.8–7.7)
NEUTROPHILS NFR BLD: 60.8 % (ref 38–73)
NITRITE, POC UA: POSITIVE
NONHDLC SERPL-MCNC: 188 MG/DL
NRBC BLD-RTO: 0 /100 WBC
PH, POC UA: 5
PLATELET # BLD AUTO: 183 K/UL (ref 150–450)
PMV BLD AUTO: 11.6 FL (ref 9.2–12.9)
POTASSIUM SERPL-SCNC: 4.9 MMOL/L (ref 3.5–5.1)
PROT SERPL-MCNC: 7.3 G/DL (ref 6–8.4)
PROTEIN, POC: POSITIVE
RBC # BLD AUTO: 4.92 M/UL (ref 4–5.4)
SATURATED IRON: 29 % (ref 20–50)
SODIUM SERPL-SCNC: 140 MMOL/L (ref 136–145)
SPECIFIC GRAVITY, POC UA: 1.01
TOTAL IRON BINDING CAPACITY: 340 UG/DL (ref 250–450)
TRANSFERRIN SERPL-MCNC: 230 MG/DL (ref 200–375)
TRIGL SERPL-MCNC: 123 MG/DL (ref 30–150)
TSH SERPL DL<=0.005 MIU/L-ACNC: 2.7 UIU/ML (ref 0.4–4)
UROBILINOGEN, POC UA: NORMAL
WBC # BLD AUTO: 4.45 K/UL (ref 3.9–12.7)

## 2021-10-19 PROCEDURE — 84443 ASSAY THYROID STIM HORMONE: CPT | Performed by: NURSE PRACTITIONER

## 2021-10-19 PROCEDURE — 99214 OFFICE O/P EST MOD 30 MIN: CPT | Mod: 25,S$GLB,, | Performed by: NURSE PRACTITIONER

## 2021-10-19 PROCEDURE — 87086 URINE CULTURE/COLONY COUNT: CPT | Performed by: NURSE PRACTITIONER

## 2021-10-19 PROCEDURE — 82728 ASSAY OF FERRITIN: CPT | Performed by: NURSE PRACTITIONER

## 2021-10-19 PROCEDURE — 87088 URINE BACTERIA CULTURE: CPT | Performed by: NURSE PRACTITIONER

## 2021-10-19 PROCEDURE — 87077 CULTURE AEROBIC IDENTIFY: CPT | Performed by: NURSE PRACTITIONER

## 2021-10-19 PROCEDURE — G0008 FLU VACCINE - QUADRIVALENT - ADJUVANTED: ICD-10-PCS | Mod: S$GLB,,, | Performed by: NURSE PRACTITIONER

## 2021-10-19 PROCEDURE — 80053 COMPREHEN METABOLIC PANEL: CPT | Performed by: NURSE PRACTITIONER

## 2021-10-19 PROCEDURE — 81002 POCT URINE DIPSTICK WITHOUT MICROSCOPE: ICD-10-PCS | Mod: S$GLB,,, | Performed by: NURSE PRACTITIONER

## 2021-10-19 PROCEDURE — 87186 SC STD MICRODIL/AGAR DIL: CPT | Performed by: NURSE PRACTITIONER

## 2021-10-19 PROCEDURE — G0008 ADMIN INFLUENZA VIRUS VAC: HCPCS | Mod: S$GLB,,, | Performed by: NURSE PRACTITIONER

## 2021-10-19 PROCEDURE — 99214 PR OFFICE/OUTPT VISIT, EST, LEVL IV, 30-39 MIN: ICD-10-PCS | Mod: 25,S$GLB,, | Performed by: NURSE PRACTITIONER

## 2021-10-19 PROCEDURE — 80061 LIPID PANEL: CPT | Performed by: NURSE PRACTITIONER

## 2021-10-19 PROCEDURE — 90694 FLU VACCINE - QUADRIVALENT - ADJUVANTED: ICD-10-PCS | Mod: S$GLB,,, | Performed by: NURSE PRACTITIONER

## 2021-10-19 PROCEDURE — 84466 ASSAY OF TRANSFERRIN: CPT | Performed by: NURSE PRACTITIONER

## 2021-10-19 PROCEDURE — 90694 VACC AIIV4 NO PRSRV 0.5ML IM: CPT | Mod: S$GLB,,, | Performed by: NURSE PRACTITIONER

## 2021-10-19 PROCEDURE — 81002 URINALYSIS NONAUTO W/O SCOPE: CPT | Mod: S$GLB,,, | Performed by: NURSE PRACTITIONER

## 2021-10-19 PROCEDURE — 85025 COMPLETE CBC W/AUTO DIFF WBC: CPT | Performed by: NURSE PRACTITIONER

## 2021-10-19 RX ORDER — NITROFURANTOIN 25; 75 MG/1; MG/1
100 CAPSULE ORAL 2 TIMES DAILY
Qty: 14 CAPSULE | Refills: 0 | Status: SHIPPED | OUTPATIENT
Start: 2021-10-19 | End: 2021-12-27 | Stop reason: ALTCHOICE

## 2021-10-19 RX ORDER — ESCITALOPRAM OXALATE 10 MG/1
10 TABLET ORAL DAILY
Qty: 90 TABLET | Refills: 2 | Status: SHIPPED | OUTPATIENT
Start: 2021-10-19 | End: 2022-07-26 | Stop reason: SDUPTHER

## 2021-10-22 ENCOUNTER — PATIENT MESSAGE (OUTPATIENT)
Dept: FAMILY MEDICINE | Facility: CLINIC | Age: 66
End: 2021-10-22

## 2021-10-22 LAB — BACTERIA UR CULT: ABNORMAL

## 2021-10-23 ENCOUNTER — PATIENT MESSAGE (OUTPATIENT)
Dept: FAMILY MEDICINE | Facility: CLINIC | Age: 66
End: 2021-10-23
Payer: MEDICARE

## 2021-11-04 ENCOUNTER — CLINICAL SUPPORT (OUTPATIENT)
Dept: CARDIOLOGY | Facility: HOSPITAL | Age: 66
End: 2021-11-04
Payer: MEDICARE

## 2021-11-04 DIAGNOSIS — Z95.818 PRESENCE OF OTHER CARDIAC IMPLANTS AND GRAFTS: ICD-10-CM

## 2021-11-04 PROCEDURE — 93298 REM INTERROG DEV EVAL SCRMS: CPT | Mod: ,,, | Performed by: INTERNAL MEDICINE

## 2021-11-04 PROCEDURE — 93298 CARDIAC DEVICE CHECK - REMOTE: ICD-10-PCS | Mod: ,,, | Performed by: INTERNAL MEDICINE

## 2021-11-17 ENCOUNTER — IMMUNIZATION (OUTPATIENT)
Dept: FAMILY MEDICINE | Facility: CLINIC | Age: 66
End: 2021-11-17
Payer: MEDICARE

## 2021-11-17 DIAGNOSIS — Z23 NEED FOR VACCINATION: Primary | ICD-10-CM

## 2021-11-17 PROCEDURE — 0004A COVID-19, MRNA, LNP-S, PF, 30 MCG/0.3 ML DOSE VACCINE: CPT | Mod: PBBFAC,PO

## 2021-12-08 ENCOUNTER — HOSPITAL ENCOUNTER (OUTPATIENT)
Dept: RADIOLOGY | Facility: HOSPITAL | Age: 66
Discharge: HOME | End: 2021-12-08
Attending: NURSE PRACTITIONER
Payer: MEDICARE

## 2021-12-08 DIAGNOSIS — Z78.0 POST-MENOPAUSAL: ICD-10-CM

## 2021-12-08 DIAGNOSIS — Z12.31 ENCOUNTER FOR SCREENING MAMMOGRAM FOR MALIGNANT NEOPLASM OF BREAST: ICD-10-CM

## 2021-12-08 PROCEDURE — 77080 DEXA BONE DENSITY SPINE HIP: ICD-10-PCS | Mod: 26,,, | Performed by: RADIOLOGY

## 2021-12-08 PROCEDURE — 77063 MAMMO DIGITAL SCREENING LEFT WITH TOMO: ICD-10-PCS | Mod: 26,,, | Performed by: RADIOLOGY

## 2021-12-08 PROCEDURE — 77063 BREAST TOMOSYNTHESIS BI: CPT | Mod: 26,,, | Performed by: RADIOLOGY

## 2021-12-08 PROCEDURE — 77080 DXA BONE DENSITY AXIAL: CPT | Mod: 26,,, | Performed by: RADIOLOGY

## 2021-12-08 PROCEDURE — 77067 MAMMO DIGITAL SCREENING LEFT WITH TOMO: ICD-10-PCS | Mod: 26,,, | Performed by: RADIOLOGY

## 2021-12-08 PROCEDURE — 77080 DXA BONE DENSITY AXIAL: CPT | Mod: TC,PO

## 2021-12-08 PROCEDURE — 77067 SCR MAMMO BI INCL CAD: CPT | Mod: 26,,, | Performed by: RADIOLOGY

## 2021-12-08 PROCEDURE — 77067 SCR MAMMO BI INCL CAD: CPT | Mod: TC,PO

## 2021-12-13 ENCOUNTER — PATIENT MESSAGE (OUTPATIENT)
Dept: FAMILY MEDICINE | Facility: CLINIC | Age: 66
End: 2021-12-13
Payer: MEDICARE

## 2021-12-27 ENCOUNTER — PATIENT MESSAGE (OUTPATIENT)
Dept: FAMILY MEDICINE | Facility: CLINIC | Age: 66
End: 2021-12-27

## 2021-12-27 ENCOUNTER — OFFICE VISIT (OUTPATIENT)
Dept: FAMILY MEDICINE | Facility: CLINIC | Age: 66
End: 2021-12-27

## 2021-12-27 DIAGNOSIS — J20.9 BRONCHITIS WITH BRONCHOSPASM: ICD-10-CM

## 2021-12-27 DIAGNOSIS — R50.9 FEVER, UNSPECIFIED FEVER CAUSE: Primary | ICD-10-CM

## 2021-12-27 DIAGNOSIS — J01.00 ACUTE MAXILLARY SINUSITIS, RECURRENCE NOT SPECIFIED: ICD-10-CM

## 2021-12-27 LAB
CTP QC/QA: YES
CTP QC/QA: YES
POC MOLECULAR INFLUENZA A AGN: NEGATIVE
POC MOLECULAR INFLUENZA B AGN: NEGATIVE
SARS-COV-2 RDRP RESP QL NAA+PROBE: NEGATIVE

## 2021-12-27 PROCEDURE — 99213 PR OFFICE/OUTPT VISIT, EST, LEVL III, 20-29 MIN: ICD-10-PCS | Mod: 95,,, | Performed by: NURSE PRACTITIONER

## 2021-12-27 PROCEDURE — 99213 OFFICE O/P EST LOW 20 MIN: CPT | Mod: 95,,, | Performed by: NURSE PRACTITIONER

## 2021-12-27 RX ORDER — DOXYCYCLINE 100 MG/1
100 CAPSULE ORAL EVERY 12 HOURS
Qty: 14 CAPSULE | Refills: 0 | Status: SHIPPED | OUTPATIENT
Start: 2021-12-27 | End: 2022-06-08 | Stop reason: SDUPTHER

## 2021-12-28 ENCOUNTER — PATIENT MESSAGE (OUTPATIENT)
Dept: FAMILY MEDICINE | Facility: CLINIC | Age: 66
End: 2021-12-28
Payer: MEDICARE

## 2021-12-28 DIAGNOSIS — R06.02 SOB (SHORTNESS OF BREATH): Primary | ICD-10-CM

## 2021-12-29 ENCOUNTER — PATIENT MESSAGE (OUTPATIENT)
Dept: FAMILY MEDICINE | Facility: CLINIC | Age: 66
End: 2021-12-29
Payer: MEDICARE

## 2021-12-29 ENCOUNTER — HOSPITAL ENCOUNTER (OUTPATIENT)
Dept: RADIOLOGY | Facility: HOSPITAL | Age: 66
Discharge: HOME | End: 2021-12-29
Attending: NURSE PRACTITIONER
Payer: MEDICARE

## 2021-12-29 DIAGNOSIS — R06.02 SOB (SHORTNESS OF BREATH): ICD-10-CM

## 2021-12-29 PROCEDURE — 71046 X-RAY EXAM CHEST 2 VIEWS: CPT | Mod: TC,FY,PO

## 2021-12-29 PROCEDURE — 71046 XR CHEST PA AND LATERAL: ICD-10-PCS | Mod: 26,,, | Performed by: RADIOLOGY

## 2021-12-29 PROCEDURE — 71046 X-RAY EXAM CHEST 2 VIEWS: CPT | Mod: 26,,, | Performed by: RADIOLOGY

## 2022-01-03 ENCOUNTER — CLINICAL SUPPORT (OUTPATIENT)
Dept: CARDIOLOGY | Facility: HOSPITAL | Age: 67
End: 2022-01-03
Payer: MEDICARE

## 2022-01-03 DIAGNOSIS — Z95.818 PRESENCE OF OTHER CARDIAC IMPLANTS AND GRAFTS: ICD-10-CM

## 2022-01-03 PROCEDURE — 93298 CARDIAC DEVICE CHECK - REMOTE: ICD-10-PCS | Mod: ,,, | Performed by: INTERNAL MEDICINE

## 2022-01-03 PROCEDURE — 93298 REM INTERROG DEV EVAL SCRMS: CPT | Mod: ,,, | Performed by: INTERNAL MEDICINE

## 2022-01-11 ENCOUNTER — PATIENT MESSAGE (OUTPATIENT)
Dept: CARDIOLOGY | Facility: CLINIC | Age: 67
End: 2022-01-11
Payer: MEDICARE

## 2022-01-31 ENCOUNTER — TELEPHONE (OUTPATIENT)
Dept: CARDIOLOGY | Facility: HOSPITAL | Age: 67
End: 2022-01-31
Payer: MEDICARE

## 2022-01-31 NOTE — TELEPHONE ENCOUNTER
"AFl w/ RVR noted during ILR device remote check:    Overall burden:  0.2% since 10/18/21    Max duration seen: 2 hrs. 42 mins (mean rate 133 bpm)          Most recent episode: 1/31/22    Ventricular rates:  Needs improvement        Anticoagulation status:  Xarelto    Meds:  Flecainide 50 mg bid  Toprol XL 25 mg daily        Patient symptoms: Pt. reports she felt palpitations, a "weird" feeling then lightheaded. She said she tried to lay down and relax, but she continued to feel palpitations. She said, she eventually fell asleep. She said, it started not long after receiving a phone call from her mother that she was not feeling well. She said, she was concerned about her mother.    Message sent to Dr. Roberts for review            "

## 2022-02-02 ENCOUNTER — CLINICAL SUPPORT (OUTPATIENT)
Dept: CARDIOLOGY | Facility: HOSPITAL | Age: 67
End: 2022-02-02
Payer: MEDICARE

## 2022-02-02 DIAGNOSIS — Z95.818 PRESENCE OF OTHER CARDIAC IMPLANTS AND GRAFTS: ICD-10-CM

## 2022-02-02 PROCEDURE — 93298 REM INTERROG DEV EVAL SCRMS: CPT | Mod: ,,, | Performed by: INTERNAL MEDICINE

## 2022-02-02 PROCEDURE — 93298 CARDIAC DEVICE CHECK - REMOTE: ICD-10-PCS | Mod: ,,, | Performed by: INTERNAL MEDICINE

## 2022-03-04 ENCOUNTER — CLINICAL SUPPORT (OUTPATIENT)
Dept: CARDIOLOGY | Facility: HOSPITAL | Age: 67
End: 2022-03-04
Payer: MEDICARE

## 2022-03-04 DIAGNOSIS — Z95.818 PRESENCE OF OTHER CARDIAC IMPLANTS AND GRAFTS: ICD-10-CM

## 2022-03-07 ENCOUNTER — OFFICE VISIT (OUTPATIENT)
Dept: CARDIOLOGY | Facility: CLINIC | Age: 67
End: 2022-03-07
Payer: MEDICARE

## 2022-03-07 VITALS
BODY MASS INDEX: 25.49 KG/M2 | DIASTOLIC BLOOD PRESSURE: 70 MMHG | SYSTOLIC BLOOD PRESSURE: 135 MMHG | HEART RATE: 54 BPM | WEIGHT: 153 LBS | HEIGHT: 65 IN

## 2022-03-07 DIAGNOSIS — I48.0 PAROXYSMAL ATRIAL FIBRILLATION: Primary | ICD-10-CM

## 2022-03-07 DIAGNOSIS — E78.2 MIXED HYPERLIPIDEMIA: Primary | ICD-10-CM

## 2022-03-07 DIAGNOSIS — I48.0 PAROXYSMAL ATRIAL FIBRILLATION: ICD-10-CM

## 2022-03-07 PROCEDURE — 93005 ELECTROCARDIOGRAM TRACING: CPT | Mod: PO

## 2022-03-07 PROCEDURE — 99213 OFFICE O/P EST LOW 20 MIN: CPT | Mod: PBBFAC,PO | Performed by: INTERNAL MEDICINE

## 2022-03-07 PROCEDURE — 99999 PR PBB SHADOW E&M-EST. PATIENT-LVL III: ICD-10-PCS | Mod: PBBFAC,,, | Performed by: INTERNAL MEDICINE

## 2022-03-07 PROCEDURE — 99999 PR PBB SHADOW E&M-EST. PATIENT-LVL III: CPT | Mod: PBBFAC,,, | Performed by: INTERNAL MEDICINE

## 2022-03-07 PROCEDURE — 93010 ELECTROCARDIOGRAM REPORT: CPT | Mod: ,,, | Performed by: INTERNAL MEDICINE

## 2022-03-07 PROCEDURE — 99214 OFFICE O/P EST MOD 30 MIN: CPT | Mod: S$PBB,,, | Performed by: INTERNAL MEDICINE

## 2022-03-07 PROCEDURE — 99214 PR OFFICE/OUTPT VISIT, EST, LEVL IV, 30-39 MIN: ICD-10-PCS | Mod: S$PBB,,, | Performed by: INTERNAL MEDICINE

## 2022-03-07 PROCEDURE — 93010 EKG 12-LEAD: ICD-10-PCS | Mod: ,,, | Performed by: INTERNAL MEDICINE

## 2022-03-07 RX ORDER — METOPROLOL TARTRATE 25 MG/1
25 TABLET, FILM COATED ORAL 2 TIMES DAILY PRN
Qty: 60 TABLET | Refills: 11 | Status: SHIPPED | OUTPATIENT
Start: 2022-03-07 | End: 2022-07-26 | Stop reason: ALTCHOICE

## 2022-03-07 NOTE — PROGRESS NOTES
"Subjective:    Patient ID:  Tamica Cardona is a 66 y.o. female who presents for follow-up of Follow-up      Problem List Items Addressed This Visit        Cardiac/Vascular    Mixed hyperlipidemia - Primary    Paroxysmal atrial fibrillation          HPI    Patient was last seen on 02/04/2021 at which time she was doing well from a cardiac standpoint and was without palpitations for last 2 months.  Loop recorder was scheduled.  Flecainide was restarted at 50 mg PO BID.  Metoprolol succinate 12.5 mg PO Daily was started.    On assessment today, the patient states that she is having issues with fatigue.    Recently started using CPAP and doing better.    No chest pain.  No shortness of breath.  Exercising 3 days a week    Issues with increased dose of flecainide - Having fatigue  Back on Xarelto - Yes    Palpitations - Rare  Most recent loop recorder interrogation shows AFib burden of 0.9%    Review of Systems   Constitutional: Negative for decreased appetite, fever and malaise/fatigue.   Eyes: Negative for blurred vision.   Cardiovascular: Negative for chest pain, dyspnea on exertion, irregular heartbeat and leg swelling.   Respiratory: Negative for cough, hemoptysis, shortness of breath and wheezing.    Endocrine: Negative for cold intolerance and heat intolerance.   Hematologic/Lymphatic: Negative for bleeding problem.   Musculoskeletal: Negative for muscle weakness and myalgias.   Gastrointestinal: Negative for abdominal pain, constipation and diarrhea.   Genitourinary: Negative for bladder incontinence.   Neurological: Negative for dizziness and weakness.   Psychiatric/Behavioral: Negative for depression.        Objective:     Vitals:    03/07/22 0941   BP: 135/70   BP Location: Left arm   Patient Position: Sitting   BP Method: Medium (Automatic)   Pulse: (!) 54   Weight: 69.4 kg (153 lb)   Height: 5' 5" (1.651 m)        Physical Exam  Vitals and nursing note reviewed.   Constitutional:       General: She is not " in acute distress.     Appearance: She is well-developed.   HENT:      Head: Normocephalic and atraumatic.   Neck:      Vascular: No JVD.   Cardiovascular:      Rate and Rhythm: Regular rhythm. Bradycardia present.      Heart sounds: Normal heart sounds. No murmur heard.    No friction rub. No gallop.   Pulmonary:      Effort: Pulmonary effort is normal. No respiratory distress.      Breath sounds: Normal breath sounds. No wheezing or rales.   Abdominal:      General: Bowel sounds are normal.      Palpations: Abdomen is soft.      Tenderness: There is no abdominal tenderness. There is no guarding or rebound.   Musculoskeletal:         General: No tenderness.      Cervical back: Neck supple.   Skin:     General: Skin is warm and dry.   Neurological:      Mental Status: She is alert and oriented to person, place, and time.   Psychiatric:         Behavior: Behavior normal.             Current Outpatient Medications on File Prior to Visit   Medication Sig    acetaminophen (TYLENOL) 500 MG tablet Take 750 mg by mouth every 6 (six) hours as needed for Pain.    calcium carbonate (OS-DESTINY) 600 mg (1,500 mg) Tab Take 600 mg by mouth 2 (two) times daily with meals.    EScitalopram oxalate (LEXAPRO) 10 MG tablet Take 1 tablet (10 mg total) by mouth once daily.    ferrous gluconate (FERGON) 325 MG tablet Take 45 mg by mouth daily with breakfast.    loratadine (CLARITIN) 10 mg tablet Take 10 mg by mouth daily as needed.     meclizine (ANTIVERT) 25 mg tablet Take 1 tablet (25 mg total) by mouth 3 (three) times daily as needed.    metoprolol succinate (TOPROL-XL) 25 MG 24 hr tablet Take 1 tablet (25 mg total) by mouth once daily.    mometasone (ELOCON) 0.1 % ointment aaa bid x 1-2 weeks then prn (Patient taking differently: Apply 1 application topically once daily. aaa bid x 1-2 weeks then prn)    omega 3-dha-epa-fish oil 500-100-1,000 mg Cap Take 1 capsule by mouth once daily.     phenazopyridine (PYRIDIUM) 200 MG tablet  Take 1 tablet (200 mg total) by mouth every 6 (six) hours as needed.    rivaroxaban (XARELTO) 20 mg Tab Take 1 tablet (20 mg total) by mouth once daily.    vitamin D (VITAMIN D3) 1000 units Tab Take 1,000 Units by mouth once daily.    doxycycline (MONODOX) 100 MG capsule Take 1 capsule (100 mg total) by mouth every 12 (twelve) hours.    flecainide (TAMBOCOR) 50 MG Tab Take 1 tablet (50 mg total) by mouth every 12 (twelve) hours.     No current facility-administered medications on file prior to visit.       Lipid Panel:   Lab Results   Component Value Date    CHOL 240 (H) 10/19/2021    HDL 52 10/19/2021    LDLCALC 163.4 (H) 10/19/2021    TRIG 123 10/19/2021    CHOLHDL 21.7 10/19/2021       The 10-year ASCVD risk score (Ace MAGANA Jr., et al., 2013) is: 7.6%    Values used to calculate the score:      Age: 66 years      Sex: Female      Is Non- : No      Diabetic: No      Tobacco smoker: No      Systolic Blood Pressure: 135 mmHg      Is BP treated: No      HDL Cholesterol: 52 mg/dL      Total Cholesterol: 240 mg/dL    All pertinent labs, imaging, and EKGs reviewed.  Patient's most recent EKG tracing was personally interpreted by this provider.    Assessment:       1. Mixed hyperlipidemia    2. Paroxysmal atrial fibrillation         Plan:     Symptoms of some fatigue, bradycardic today  BP/Pulse OK today  Most recent echocardiogram reviewed personally     Recently got CPAP, going well, this will minimize her risk of aFib in the future  HR steve on flecainide and metoprolol  Stop flecainide, stop metoprolol   PRN metoprolol 25mg PO BID PRN aFib episodes  Monitor for recurrence of aFib, if aFib recurs, will consider referral to EP for evaluation for ablation in the setting of aFib with intolerance of Flecainide  Continue Xarelto 20 mg PO Daily to be taken with the largest meal of the day   Continue following loop recorder, if aFib is quiet, could consider getting of Xarelto at next  visit    Continue other cardiac medications  Mediterranean Diet/Cardiovascular Exercise Program    Patient queried and all questions were answered.    F/u in 6 months to reassess      Signed:    Collin Roberts MD  3/7/2022 8:05 AM

## 2022-03-09 ENCOUNTER — PATIENT MESSAGE (OUTPATIENT)
Dept: CARDIOLOGY | Facility: CLINIC | Age: 67
End: 2022-03-09
Payer: MEDICARE

## 2022-03-09 DIAGNOSIS — I48.0 PAROXYSMAL ATRIAL FIBRILLATION: Primary | ICD-10-CM

## 2022-03-10 ENCOUNTER — PATIENT MESSAGE (OUTPATIENT)
Dept: CARDIOLOGY | Facility: CLINIC | Age: 67
End: 2022-03-10
Payer: MEDICARE

## 2022-03-10 DIAGNOSIS — I48.0 PAROXYSMAL ATRIAL FIBRILLATION: Primary | ICD-10-CM

## 2022-03-10 RX ORDER — METOPROLOL SUCCINATE 25 MG/1
12.5 TABLET, EXTENDED RELEASE ORAL DAILY
Qty: 45 TABLET | Refills: 3 | Status: SHIPPED | OUTPATIENT
Start: 2022-03-10 | End: 2022-06-30 | Stop reason: SDUPTHER

## 2022-03-24 ENCOUNTER — PES CALL (OUTPATIENT)
Dept: ADMINISTRATIVE | Facility: CLINIC | Age: 67
End: 2022-03-24
Payer: MEDICARE

## 2022-03-28 ENCOUNTER — PATIENT MESSAGE (OUTPATIENT)
Dept: FAMILY MEDICINE | Facility: CLINIC | Age: 67
End: 2022-03-28
Payer: MEDICARE

## 2022-03-28 RX ORDER — NITROFURANTOIN 25; 75 MG/1; MG/1
100 CAPSULE ORAL 2 TIMES DAILY
Qty: 14 CAPSULE | Refills: 0 | Status: SHIPPED | OUTPATIENT
Start: 2022-03-28 | End: 2022-07-26 | Stop reason: ALTCHOICE

## 2022-04-03 ENCOUNTER — CLINICAL SUPPORT (OUTPATIENT)
Dept: CARDIOLOGY | Facility: HOSPITAL | Age: 67
End: 2022-04-03
Payer: MEDICARE

## 2022-04-03 DIAGNOSIS — Z95.818 PRESENCE OF OTHER CARDIAC IMPLANTS AND GRAFTS: ICD-10-CM

## 2022-04-03 PROCEDURE — 93298 CARDIAC DEVICE CHECK - REMOTE: ICD-10-PCS | Mod: ,,, | Performed by: INTERNAL MEDICINE

## 2022-04-03 PROCEDURE — 93298 REM INTERROG DEV EVAL SCRMS: CPT | Mod: ,,, | Performed by: INTERNAL MEDICINE

## 2022-04-20 ENCOUNTER — PATIENT OUTREACH (OUTPATIENT)
Dept: ADMINISTRATIVE | Facility: OTHER | Age: 67
End: 2022-04-20
Payer: MEDICARE

## 2022-04-20 NOTE — PROGRESS NOTES
Health Maintenance Due   Topic Date Due    Shingles Vaccine (2 of 3) 12/14/2016    Pneumococcal Vaccines (Age 65+) (2 - PPSV23 or PCV20) 11/28/2021     Updates were requested from care everywhere.  Chart was reviewed for overdue Proactive [facility] Encounters ([facility]AS[facility]) topics (CRS, Breast Cancer Screening, Eye exam)  Health Maintenance has been updated.  LINKS immunization registry triggered.  Immunizations were reconciled.

## 2022-04-23 PROBLEM — G47.33 OBSTRUCTIVE SLEEP APNEA SYNDROME: Status: ACTIVE | Noted: 2022-04-23

## 2022-04-23 PROBLEM — I49.5 TACHY-BRADY SYNDROME: Status: ACTIVE | Noted: 2022-04-23

## 2022-04-23 NOTE — PROGRESS NOTES
Subjective:    Patient ID:  Tamica Cardona is a 66 y.o. female who presents for evaluation of PAF      HPI 67 yo female with atrial fibrillation, tachy-steve syndrome, YURI (on CPAP), Depression, Breast cancer (rt side, s/p mastectomy + chemo).  Primary cardiologist is Dr. Roberts.  Has noted palpitations over the past 3 years  Ultimately diagnosed with AF, noted on treadmill test.  Placed on Flecainide and metoprolol.  Noted fatigue and bradycardia. Medications reduced, Flecainide ultimately discontinued.  Subsequently diagnosed with sleep apnea, and now on CPAP.  Toprol decreased to 12.5 mg daily >> had more symptoms >> she resumed Toprol 25 mg daily.    ILR implanted 2/8/21  Echo 10/12/20 EF 55% KANDIS 19 mild to mod MR  AF burden < 1%, with longest episode being 2 hours 42 minutes.    CHADSVASc is 2, on Xarelto    Notes significant stress (Mom is in an assisted living facility, building a house).    Review of Systems   Constitutional: Negative. Negative for fever and malaise/fatigue.   HENT: Negative for congestion and sore throat.    Cardiovascular: Positive for palpitations. Negative for chest pain, dyspnea on exertion, irregular heartbeat, leg swelling, near-syncope, orthopnea, paroxysmal nocturnal dyspnea and syncope.   Respiratory: Negative for cough and shortness of breath.    Gastrointestinal: Negative for abdominal pain, constipation and diarrhea.   Neurological: Negative for dizziness, light-headedness and weakness.   Psychiatric/Behavioral: Negative for depression. The patient is not nervous/anxious.         Objective:    Physical Exam  Constitutional:       Appearance: She is well-developed.   Eyes:      General: No scleral icterus.     Conjunctiva/sclera: Conjunctivae normal.   Neck:      Vascular: No JVD.      Trachea: No tracheal deviation.   Cardiovascular:      Rate and Rhythm: Regular rhythm. Bradycardia present.      Chest Wall: PMI is not displaced.   Pulmonary:      Effort: Pulmonary  effort is normal. No respiratory distress.      Breath sounds: Normal breath sounds.   Abdominal:      Palpations: Abdomen is soft.      Tenderness: There is no abdominal tenderness.   Musculoskeletal:         General: No tenderness.   Skin:     General: Skin is warm and dry.      Findings: No rash.   Neurological:      Mental Status: She is alert and oriented to person, place, and time.   Psychiatric:         Behavior: Behavior normal.           Assessment:       1. Paroxysmal atrial fibrillation    2. History of breast cancer    3. Chronic low back pain with sciatica, sciatica laterality unspecified, unspecified back pain laterality    4. Depression, unspecified depression type    5. Obstructive sleep apnea syndrome    6. Tachy-steve syndrome         Plan:       Overall burden of AF is very low.  Her bigger challenge at this point is anxiety related to external stressors.  Recommend focusing on management of anxiety, which I suspect will reduce her AF burden even more.  F/u in 6 months. If/when AF burden increases, consider PVI.

## 2022-04-25 ENCOUNTER — OFFICE VISIT (OUTPATIENT)
Dept: CARDIOLOGY | Facility: CLINIC | Age: 67
End: 2022-04-25
Payer: MEDICARE

## 2022-04-25 VITALS
BODY MASS INDEX: 24.52 KG/M2 | DIASTOLIC BLOOD PRESSURE: 72 MMHG | WEIGHT: 152.56 LBS | HEIGHT: 66 IN | SYSTOLIC BLOOD PRESSURE: 132 MMHG | HEART RATE: 63 BPM

## 2022-04-25 DIAGNOSIS — G89.29 CHRONIC LOW BACK PAIN WITH SCIATICA, SCIATICA LATERALITY UNSPECIFIED, UNSPECIFIED BACK PAIN LATERALITY: ICD-10-CM

## 2022-04-25 DIAGNOSIS — Z85.3 HISTORY OF BREAST CANCER: ICD-10-CM

## 2022-04-25 DIAGNOSIS — I48.0 PAROXYSMAL ATRIAL FIBRILLATION: Primary | ICD-10-CM

## 2022-04-25 DIAGNOSIS — I49.5 TACHY-BRADY SYNDROME: ICD-10-CM

## 2022-04-25 DIAGNOSIS — G47.33 OBSTRUCTIVE SLEEP APNEA SYNDROME: ICD-10-CM

## 2022-04-25 DIAGNOSIS — F32.A DEPRESSION, UNSPECIFIED DEPRESSION TYPE: ICD-10-CM

## 2022-04-25 DIAGNOSIS — M54.40 CHRONIC LOW BACK PAIN WITH SCIATICA, SCIATICA LATERALITY UNSPECIFIED, UNSPECIFIED BACK PAIN LATERALITY: ICD-10-CM

## 2022-04-25 PROCEDURE — 99205 PR OFFICE/OUTPT VISIT, NEW, LEVL V, 60-74 MIN: ICD-10-PCS | Mod: S$PBB,,, | Performed by: INTERNAL MEDICINE

## 2022-04-25 PROCEDURE — 99999 PR PBB SHADOW E&M-EST. PATIENT-LVL III: ICD-10-PCS | Mod: PBBFAC,,, | Performed by: INTERNAL MEDICINE

## 2022-04-25 PROCEDURE — 99205 OFFICE O/P NEW HI 60 MIN: CPT | Mod: S$PBB,,, | Performed by: INTERNAL MEDICINE

## 2022-04-25 PROCEDURE — 99213 OFFICE O/P EST LOW 20 MIN: CPT | Mod: PBBFAC,PO | Performed by: INTERNAL MEDICINE

## 2022-04-25 PROCEDURE — 99999 PR PBB SHADOW E&M-EST. PATIENT-LVL III: CPT | Mod: PBBFAC,,, | Performed by: INTERNAL MEDICINE

## 2022-05-03 ENCOUNTER — CLINICAL SUPPORT (OUTPATIENT)
Dept: CARDIOLOGY | Facility: HOSPITAL | Age: 67
End: 2022-05-03
Payer: MEDICARE

## 2022-05-03 DIAGNOSIS — Z95.818 PRESENCE OF OTHER CARDIAC IMPLANTS AND GRAFTS: ICD-10-CM

## 2022-05-30 ENCOUNTER — TELEPHONE (OUTPATIENT)
Dept: FAMILY MEDICINE | Facility: CLINIC | Age: 67
End: 2022-05-30
Payer: MEDICARE

## 2022-05-30 NOTE — TELEPHONE ENCOUNTER
Pt stated that she was exposed to her grand daughter that has Covid and now pt has cough, congestion and sore throat. Pt was scheduled with NP at the [facility].

## 2022-05-31 ENCOUNTER — PATIENT MESSAGE (OUTPATIENT)
Dept: FAMILY MEDICINE | Facility: CLINIC | Age: 67
End: 2022-05-31
Payer: MEDICARE

## 2022-06-02 ENCOUNTER — CLINICAL SUPPORT (OUTPATIENT)
Dept: CARDIOLOGY | Facility: HOSPITAL | Age: 67
End: 2022-06-02
Payer: MEDICARE

## 2022-06-02 DIAGNOSIS — Z95.818 PRESENCE OF OTHER CARDIAC IMPLANTS AND GRAFTS: ICD-10-CM

## 2022-06-02 PROCEDURE — 93298 CARDIAC DEVICE CHECK - REMOTE: ICD-10-PCS | Mod: ,,, | Performed by: INTERNAL MEDICINE

## 2022-06-02 PROCEDURE — 93298 REM INTERROG DEV EVAL SCRMS: CPT | Mod: ,,, | Performed by: INTERNAL MEDICINE

## 2022-06-08 ENCOUNTER — PATIENT MESSAGE (OUTPATIENT)
Dept: FAMILY MEDICINE | Facility: CLINIC | Age: 67
End: 2022-06-08
Payer: MEDICARE

## 2022-06-08 RX ORDER — DOXYCYCLINE HYCLATE 100 MG
100 TABLET ORAL 2 TIMES DAILY
Qty: 20 TABLET | Refills: 0 | Status: SHIPPED | OUTPATIENT
Start: 2022-06-08 | End: 2022-07-26 | Stop reason: ALTCHOICE

## 2022-06-30 DIAGNOSIS — I48.0 PAROXYSMAL ATRIAL FIBRILLATION: ICD-10-CM

## 2022-06-30 RX ORDER — METOPROLOL SUCCINATE 25 MG/1
25 TABLET, EXTENDED RELEASE ORAL DAILY
Qty: 90 TABLET | Refills: 3 | Status: SHIPPED | OUTPATIENT
Start: 2022-06-30 | End: 2023-01-24

## 2022-07-02 ENCOUNTER — CLINICAL SUPPORT (OUTPATIENT)
Dept: CARDIOLOGY | Facility: HOSPITAL | Age: 67
End: 2022-07-02
Payer: MEDICARE

## 2022-07-02 DIAGNOSIS — Z95.818 PRESENCE OF OTHER CARDIAC IMPLANTS AND GRAFTS: ICD-10-CM

## 2022-07-13 ENCOUNTER — TELEPHONE (OUTPATIENT)
Dept: CARDIOLOGY | Facility: HOSPITAL | Age: 67
End: 2022-07-13
Payer: MEDICARE

## 2022-07-13 NOTE — TELEPHONE ENCOUNTER
atrial fibrillation with RVR noted on billable report:    Overall burden:  0.5% April 25, 22-July 12, 22, no lifetime burden available    Patient symptoms: patient recalls being under stress moving in June and staying with daughter in [place], states she is aware she is in AF and if it is a prolonged period of time, she lays down and subsides  She is currently feeling better and stress has subsided and back to baseline and is moved in      Max duration seen: several days with multiple episodes noted suggesting possible continuous episode, longest single episode 1hr 8mins median Vrate 125bpm on 6/21/22      Most recent episode: 7/5/22 2mins median vrate 78bpm      Ventricular rates:  Needs improvement while in AF    Anticoagulation status:  Xarelto 20mg daily  Toprol XL 25mg daily

## 2022-07-19 ENCOUNTER — PATIENT MESSAGE (OUTPATIENT)
Dept: FAMILY MEDICINE | Facility: CLINIC | Age: 67
End: 2022-07-19
Payer: MEDICARE

## 2022-07-19 ENCOUNTER — TELEPHONE (OUTPATIENT)
Dept: CARDIOLOGY | Facility: HOSPITAL | Age: 67
End: 2022-07-19
Payer: MEDICARE

## 2022-07-19 NOTE — TELEPHONE ENCOUNTER
AF w/ RVR  noted during  ILR device remote check:    AT/AF burden: 3.6% since 7/12    Max duration seen: 2 hrs. 44 mins.  Mean V rate 130 bpm        Most recent episode: 7/17/22    Ventricular rates: Needs improvement      V rates not in AF:        Anticoagulation status:  Xarelto      Meds:  Xarelto 20 mg daily  Toprol XL 25 mg daily    Patient symptoms: Pt. Reports she was under a lot of stress this weekend and she knows her stress/anxiety triggers her AF. She reports feeling nervousness, fast/pounding heartbeat, she said it felt like her heart was going to come out of her chest and a little shortness of breath. She had allergy symptoms, so she used Flonase Friday.  She reports she is home now, so she got rid of the stress and she is feeling fine      Message sent to Dr. Roberts for review

## 2022-07-20 ENCOUNTER — PATIENT MESSAGE (OUTPATIENT)
Dept: FAMILY MEDICINE | Facility: CLINIC | Age: 67
End: 2022-07-20
Payer: MEDICARE

## 2022-07-26 ENCOUNTER — OFFICE VISIT (OUTPATIENT)
Dept: FAMILY MEDICINE | Facility: CLINIC | Age: 67
End: 2022-07-26
Payer: MEDICARE

## 2022-07-26 VITALS
SYSTOLIC BLOOD PRESSURE: 128 MMHG | WEIGHT: 152.69 LBS | HEART RATE: 50 BPM | BODY MASS INDEX: 24.54 KG/M2 | OXYGEN SATURATION: 97 % | HEIGHT: 66 IN | DIASTOLIC BLOOD PRESSURE: 64 MMHG | TEMPERATURE: 98 F | RESPIRATION RATE: 20 BRPM

## 2022-07-26 DIAGNOSIS — F41.9 ANXIETY: Primary | ICD-10-CM

## 2022-07-26 DIAGNOSIS — I48.0 PAROXYSMAL ATRIAL FIBRILLATION: ICD-10-CM

## 2022-07-26 PROCEDURE — 99214 OFFICE O/P EST MOD 30 MIN: CPT | Mod: S$GLB,,, | Performed by: NURSE PRACTITIONER

## 2022-07-26 PROCEDURE — 99214 PR OFFICE/OUTPT VISIT, EST, LEVL IV, 30-39 MIN: ICD-10-PCS | Mod: S$GLB,,, | Performed by: NURSE PRACTITIONER

## 2022-07-26 RX ORDER — TRIAMCINOLONE ACETONIDE 55 UG/1
SPRAY, METERED NASAL
COMMUNITY
Start: 2022-02-01 | End: 2022-09-07

## 2022-07-26 RX ORDER — ESCITALOPRAM OXALATE 20 MG/1
20 TABLET ORAL DAILY
Qty: 90 TABLET | Refills: 2 | Status: SHIPPED | OUTPATIENT
Start: 2022-07-26 | End: 2022-10-10 | Stop reason: SDUPTHER

## 2022-07-26 RX ORDER — BUSPIRONE HYDROCHLORIDE 10 MG/1
10 TABLET ORAL 2 TIMES DAILY
Qty: 60 TABLET | Refills: 2 | Status: SHIPPED | OUTPATIENT
Start: 2022-07-26 | End: 2022-08-25

## 2022-07-26 RX ORDER — ASCORBIC ACID 500 MG
500 TABLET ORAL DAILY
COMMUNITY

## 2022-07-26 NOTE — PROGRESS NOTES
"Subjective:       Patient ID: Tamica Cardona is a 66 y.o. female.    Chief Complaint: Anxiety    HPI here with concerns for anxiety. States she has had an issue for some time but thought it was related to the fact that they were building a house and that when that was finished she would get back to normal. States that has not happened. States that they moved in about 3 weeks ago and her anxiety is worse than ever. States she is more short tempered. States she finds that the simplest thing like watching her grandchildren causes her anxiety. She has been on Lexapro 10mg for a long time. She also feels that when she is anxious it throws her into atrial fib. She is followed by Cardiology. She is on metoprolol XL 25mg daily. She is also on Xarelto. She has good BP control. We did discuss that sometimes atrial fib can cause increased anxiety due to the "shaky" feeling in chest, SOB, etc.  She wanted to know how she would know if she was in atrial fib.  Advised to check her BP/pulse and if machine cannot calculate her pulse then usually means she is in atrial fib.     We discussed options for treating her anxiety. Have decided to increase the Lexapro to 20 mg and add Buspar 10mg BID. She will follow up if this does not help. States despite the anxiety she is sleeping well at night. Denies thoughts of wanting to harm herself or others. States she has follow up with Cardiology coming up soon.     No other concerns. See ROS.    The following portion of the patients history was reviewed and updated as appropriate: allergies, current medications, past medical and surgical history. Past social history and problem list reviewed. Family PMH and Past social history reviewed. Tobacco, Illicit drug use reviewed.      Review of patient's allergies indicates:   Allergen Reactions    Thimerosal Swelling    Erythromycin base      Other reaction(s): Vomiting  Other reaction(s): Stomach upset  Other reaction(s): Nausea    Penicillins "      Other reaction(s): convulsions         Current Outpatient Medications:     acetaminophen (TYLENOL) 500 MG tablet, Take 750 mg by mouth every 6 (six) hours as needed for Pain., Disp: , Rfl:     ascorbic acid, vitamin C, (VITAMIN C) 500 MG tablet, Take 500 mg by mouth once daily., Disp: , Rfl:     calcium carbonate (OS-DESTINY) 600 mg (1,500 mg) Tab, Take 600 mg by mouth 2 (two) times daily with meals., Disp: , Rfl:     EScitalopram oxalate (LEXAPRO) 10 MG tablet, Take 1 tablet (10 mg total) by mouth once daily., Disp: 90 tablet, Rfl: 2    ferrous gluconate (FERGON) 325 MG tablet, Take 45 mg by mouth daily with breakfast., Disp: , Rfl:     loratadine (CLARITIN) 10 mg tablet, Take 10 mg by mouth daily as needed. , Disp: , Rfl:     meclizine (ANTIVERT) 25 mg tablet, Take 1 tablet (25 mg total) by mouth 3 (three) times daily as needed., Disp: 30 tablet, Rfl: 1    metoprolol succinate (TOPROL-XL) 25 MG 24 hr tablet, Take 1 tablet (25 mg total) by mouth once daily., Disp: 90 tablet, Rfl: 3    omega 3-dha-epa-fish oil 500-100-1,000 mg Cap, Take 1 capsule by mouth once daily. , Disp: , Rfl:     phenazopyridine (PYRIDIUM) 200 MG tablet, Take 1 tablet (200 mg total) by mouth every 6 (six) hours as needed., Disp: 30 tablet, Rfl: 2    rivaroxaban (XARELTO) 20 mg Tab, Take 1 tablet (20 mg total) by mouth once daily., Disp: 30 tablet, Rfl: 11    triamcinolone (NASACORT) 55 mcg nasal inhaler, , Disp: , Rfl:     vitamin D (VITAMIN D3) 1000 units Tab, Take 1,000 Units by mouth once daily., Disp: , Rfl:     doxycycline (VIBRA-TABS) 100 MG tablet, Take 1 tablet (100 mg total) by mouth 2 (two) times daily., Disp: 20 tablet, Rfl: 0    metoprolol tartrate (LOPRESSOR) 25 MG tablet, Take 1 tablet (25 mg total) by mouth 2 (two) times daily as needed (aFib episodes)., Disp: 60 tablet, Rfl: 11    mometasone (ELOCON) 0.1 % ointment, aaa bid x 1-2 weeks then prn (Patient taking differently: Apply 1 application topically once  daily. aaa bid x 1-2 weeks then prn), Disp: 45 g, Rfl: 6    nitrofurantoin, macrocrystal-monohydrate, (MACROBID) 100 MG capsule, Take 1 capsule (100 mg total) by mouth 2 (two) times daily., Disp: 14 capsule, Rfl: 0    Past Medical History:   Diagnosis Date    Allergy     Anxiety     Atrial fibrillation 10/12/2020    Breast cancer 11/2003    right    Depression     General anesthetics causing adverse effect in therapeutic use     slow to wake-- easily sedated    History of UTI     Interstitial cystitis 2003    Neutropenia        Past Surgical History:   Procedure Laterality Date    BREAST BIOPSY Left 2006    benign    CYSTOSCOPY  2003    EYE SURGERY  January 2015    HYSTERECTOMY      TVH, ovaries intact. Uterine prolapse    INSERTION OF IMPLANTABLE LOOP RECORDER Left 2/8/2021    Procedure: Insertion, Implantable Loop Recorder;  Surgeon: Collin Roberts MD;  Location: [facility];  Service: Cardiology;  Laterality: Left;    KNEE ARTHROSCOPY W/ MENISCECTOMY  1992    LT    left breast biopsy  2006    benign    MASTECTOMY, RADICAL  12/2003    RT    RETINAL DETACHMENT SURGERY Right        Social History     Socioeconomic History    Marital status:    Tobacco Use    Smoking status: Never Smoker    Smokeless tobacco: Never Used   Substance and Sexual Activity    Alcohol use: No    Drug use: No     Social Determinants of Health     Financial Resource Strain: Low Risk     Difficulty of Paying Living Expenses: Not hard at all   Food Insecurity: No Food Insecurity    Worried About Running Out of Food in the Last Year: Never true    Ran Out of Food in the Last Year: Never true   Transportation Needs: No Transportation Needs    Lack of Transportation (Medical): No    Lack of Transportation (Non-Medical): No   Physical Activity: Insufficiently Active    Days of Exercise per Week: 2 days    Minutes of Exercise per Session: 20 min   Stress: Stress Concern Present    Feeling of Stress : Very  "much   Social Connections: Unknown    Frequency of Communication with Friends and Family: More than three times a week    Frequency of Social Gatherings with Friends and Family: Twice a week    Active Member of Clubs or Organizations: Yes    Attends Club or Organization Meetings: More than 4 times per year    Marital Status:    Housing Stability: High Risk    Unable to Pay for Housing in the Last Year: No    Number of Places Lived in the Last Year: 3    Unstable Housing in the Last Year: No     Review of Systems   Constitutional: Negative for fatigue and fever.   HENT: Negative for congestion, postnasal drip, rhinorrhea and voice change.    Eyes: Negative for visual disturbance.   Respiratory: Negative for cough, chest tightness, shortness of breath and wheezing.    Cardiovascular: Positive for palpitations. Negative for chest pain and leg swelling.   Gastrointestinal: Negative for abdominal pain, blood in stool, constipation, diarrhea, nausea and vomiting.   Genitourinary: Negative for difficulty urinating and dysuria.   Musculoskeletal: Negative for arthralgias, back pain and gait problem.   Skin: Negative for rash and wound.   Neurological: Negative for dizziness, weakness and headaches.   Hematological: Negative for adenopathy. Does not bruise/bleed easily.   Psychiatric/Behavioral: Positive for agitation. Negative for decreased concentration, dysphoric mood and sleep disturbance. The patient is nervous/anxious.        Objective:      /64   Pulse (!) 50   Temp 97.9 °F (36.6 °C)   Resp 20   Ht 5' 5.5" (1.664 m)   Wt 69.3 kg (152 lb 10.7 oz)   SpO2 97%   BMI 25.02 kg/m²       Physical Exam  Constitutional:       General: She is not in acute distress.     Appearance: Normal appearance. She is well-developed and normal weight.   HENT:      Head: Normocephalic.      Mouth/Throat:      Mouth: Mucous membranes are moist.      Pharynx: Oropharynx is clear.   Eyes:      Conjunctiva/sclera: " Conjunctivae normal.      Pupils: Pupils are equal, round, and reactive to light.   Neck:      Thyroid: No thyromegaly.      Vascular: No carotid bruit.      Trachea: Trachea normal. No tracheal tenderness or tracheal deviation.   Cardiovascular:      Rate and Rhythm: Normal rate and regular rhythm.      Pulses: Normal pulses.           Carotid pulses are 2+ on the right side and 2+ on the left side.       Radial pulses are 2+ on the right side and 2+ on the left side.      Heart sounds: Normal heart sounds. No murmur heard.    No gallop.   Pulmonary:      Effort: Pulmonary effort is normal. No respiratory distress.      Breath sounds: Normal breath sounds. No stridor. No wheezing, rhonchi or rales.   Abdominal:      General: Bowel sounds are normal.      Palpations: Abdomen is soft. There is no splenomegaly or mass.      Tenderness: There is no abdominal tenderness. There is no rebound.   Musculoskeletal:         General: Normal range of motion.      Cervical back: Full passive range of motion without pain, normal range of motion and neck supple. No edema.      Right lower leg: No edema.      Left lower leg: No edema.      Comments: Gait and coordination normal.  strong, equal. Upper and lower extremity strength normal.    Skin:     General: Skin is warm and dry.      Capillary Refill: Capillary refill takes less than 2 seconds.      Findings: No lesion or rash.   Neurological:      General: No focal deficit present.      Mental Status: She is alert and oriented to person, place, and time.   Psychiatric:         Attention and Perception: Attention and perception normal.         Mood and Affect: Affect normal. Mood is anxious.         Speech: Speech normal.         Behavior: Behavior normal.         Assessment:       1. Anxiety    2. Paroxysmal atrial fibrillation        Plan:       Anxiety: increase lexapro to 20mg. Start on Buspar 10mg BID. Follow up in 3 weeks, sooner if not improving.    Paroxysmal atrial  fibrillation: continue current medications. Pulse at 50 with Metoprolol XL.  Denies CP or SOB. Follow up with Cardiology.    Other orders  -     EScitalopram oxalate (LEXAPRO) 20 MG tablet; Take 1 tablet (20 mg total) by mouth once daily.  Dispense: 90 tablet; Refill: 2  -     busPIRone (BUSPAR) 10 MG tablet; Take 1 tablet (10 mg total) by mouth 2 (two) times daily.  Dispense: 60 tablet; Refill: 2    I spent a total of 30  minutes on the day of the visit.     This includes face to face time with the patient, as well as non-face to face time preparing for and completing the visit (review of prior diagnostic testing and clinical notes, obtaining or reviewing history, documenting clinical information in the EMR, independently interpreting and communicating results to the patient/family and coordinating ongoing care).        Continue current medication  Take medications only as prescribed  Healthy diet, exercise  Adequate rest  Adequate hydration  Avoid allergens  Avoid excessive caffeine     follow up in October for annual exam.

## 2022-08-01 ENCOUNTER — CLINICAL SUPPORT (OUTPATIENT)
Dept: CARDIOLOGY | Facility: HOSPITAL | Age: 67
End: 2022-08-01
Payer: MEDICARE

## 2022-08-01 DIAGNOSIS — Z95.818 PRESENCE OF OTHER CARDIAC IMPLANTS AND GRAFTS: ICD-10-CM

## 2022-08-01 PROCEDURE — 93298 REM INTERROG DEV EVAL SCRMS: CPT | Mod: ,,, | Performed by: INTERNAL MEDICINE

## 2022-08-01 PROCEDURE — 93298 CARDIAC DEVICE CHECK - REMOTE: ICD-10-PCS | Mod: ,,, | Performed by: INTERNAL MEDICINE

## 2022-08-19 ENCOUNTER — PATIENT MESSAGE (OUTPATIENT)
Dept: FAMILY MEDICINE | Facility: CLINIC | Age: 67
End: 2022-08-19
Payer: MEDICARE

## 2022-08-25 ENCOUNTER — OFFICE VISIT (OUTPATIENT)
Dept: OPTOMETRY | Facility: CLINIC | Age: 67
End: 2022-08-25
Payer: MEDICARE

## 2022-08-25 DIAGNOSIS — H31.003 CHORIORETINAL SCAR, BILATERAL: ICD-10-CM

## 2022-08-25 DIAGNOSIS — Z13.5 GLAUCOMA SCREENING: ICD-10-CM

## 2022-08-25 DIAGNOSIS — H52.4 MYOPIA WITH ASTIGMATISM AND PRESBYOPIA, BILATERAL: ICD-10-CM

## 2022-08-25 DIAGNOSIS — H25.13 NUCLEAR SCLEROSIS, BILATERAL: Primary | ICD-10-CM

## 2022-08-25 DIAGNOSIS — H52.13 MYOPIA WITH ASTIGMATISM AND PRESBYOPIA, BILATERAL: ICD-10-CM

## 2022-08-25 DIAGNOSIS — H52.203 MYOPIA WITH ASTIGMATISM AND PRESBYOPIA, BILATERAL: ICD-10-CM

## 2022-08-25 PROCEDURE — 92014 PR EYE EXAM, EST PATIENT,COMPREHESV: ICD-10-PCS | Mod: S$PBB,,, | Performed by: OPTOMETRIST

## 2022-08-25 PROCEDURE — 99213 OFFICE O/P EST LOW 20 MIN: CPT | Mod: PBBFAC,PO | Performed by: OPTOMETRIST

## 2022-08-25 PROCEDURE — 92015 PR REFRACTION: ICD-10-PCS | Mod: ,,, | Performed by: OPTOMETRIST

## 2022-08-25 PROCEDURE — 92014 COMPRE OPH EXAM EST PT 1/>: CPT | Mod: S$PBB,,, | Performed by: OPTOMETRIST

## 2022-08-25 PROCEDURE — 99999 PR PBB SHADOW E&M-EST. PATIENT-LVL III: CPT | Mod: PBBFAC,,, | Performed by: OPTOMETRIST

## 2022-08-25 PROCEDURE — 92015 DETERMINE REFRACTIVE STATE: CPT | Mod: ,,, | Performed by: OPTOMETRIST

## 2022-08-25 PROCEDURE — 99999 PR PBB SHADOW E&M-EST. PATIENT-LVL III: ICD-10-PCS | Mod: PBBFAC,,, | Performed by: OPTOMETRIST

## 2022-08-25 NOTE — PATIENT INSTRUCTIONS
"DRY EYES -- BURNING OR TERESA SYMPTOMS:  Use Over The Counter artificial tears as needed for dry eye symptoms.   Some common brands include:  Systane, Optive, Refresh, and Thera-Tears.  These drops can be used as frequently as desired, but may be most helpful use during long periods of concentrated work.  For example, reading / working at the computer. Start with 3-4x per day.     Nighttime Ophthalmic gel or ointments are available: Refresh PM, Genteal, and Lacrilube.    Avoid drops that "get redness out" (Visine, Murine, Clear Eyes), as these may contain medication that could further irritate the eyes, especially with chronic use.    ALLERGY EYES -- ITCHING SYMPTOMS:  Over the counter medications include--Pataday, Zaditor, and Alaway.  Use as directed 1-2 drops daily for symptoms of itching / watering eyes.  These drops will not help for dry eye or exposure symptoms.    REDNESS RELIEF:  Lumify---is a good redness reliever that will not cause irritation if used chronically.        FLASHES / FLOATERS / POSTERIOR VITREOUS DETACHMENT    Call the clinic if you have any further changes in symptoms.  Including:  Increased numbers of floaters or flashing lights, dimness or darkness that moves through or stays constant in your vision, or any pain in the eye (s).    You may sometimes see small specks or clouds moving in your field of vision.  They are called FLOATERS.  You can often see them when looking at a plain background, like a blank wall or blue neal.  Floaters are actually tiny clumps of gel or cells inside the VITREOUS, the clear jelly-like fluid that fills the inside of your eye.    While these objects look like they are in front of your eye, they are actually floating inside.  What you see are the shadows they cast on the RETINA, the nerve layer at the back of the eye that senses light and allows you to see.      POSTERIOR VITREOUS DETACHMENT    The appearance of new floaters may be alarming.  If you suddenly " develop new floaters, you should contact your eye care professional  right away.    The retina can tear if the shrinking vitreous pulls away from the wall of the eye.  This sometimes causes a small amount of bleeding in the eye that may appear as new floaters.    A torn retina is always a serious problem, since it can lead to a retinal detachment.  You should see your eye care professional as soon as possible if:    even one new floater appears suddenly;  you see sudden flashes of light;  you notice other symptoms, like the loss of side vision, or a curtain closes down in your vision        POSTERIOR VITREOUS DETACHMENT is more common for people who:    are nearsighted;  have had cataract surgery;  have had YAG laser surgery of the eye;  have had inflammation inside the eye;  are over age 60.      While some floaters may remain visible, many of them will fade over time and become less noticeable.  Even if you've had some floaters for years, you should have your eyes checked as soon as possible if you notice new ones.    FLASHING LIGHTS    When the vitreous gel rubs or pulls on the retina, you may see what look like flashing lights or lightning streaks.  These flashes can appear off and on for several weeks or months.      Some people experience flashes of light that appear as jagged lines or heat waves in both eyes, lasting 10-20 minutes.  These flashes are caused by a spasm of blood vessels in the brain, which is called a migraine.    If a headache follows these flashes, it's called a migraine headache.  If   no headache occurs, these flashes are called Ophthalmic or Ocular Migraine.           Early Cataracts--not visually significant for surgery consultation.    What Are Cataracts?  A clear lens in the eye focuses light. This lets the eye see images sharply. With age, the lens slowly becomes cloudy. The cloudy lens is a cataract. A cataract scatters light and makes it hard for the eye to focus. Cataracts often  form in both eyes. But one lens may cloud faster than the other.      The Aging of Your Lens    Your lens may cloud so slowly that you don`t notice any vision changes at first. But as the cataract gets worse, the eye has a harder time focusing. In early stages, glasses may help you see better. As the lens gets cloudier, your doctor may recommend surgery to restore your vision.

## 2022-08-25 NOTE — PROGRESS NOTES
HPI     Pt here for annual exam to monitor cataracts. Last exam- 08/24/2021    Pt sts [facility] OU is ok. Pt wearing glasses from 4 years ago. Pt sts she only   uses glasses for driving at night. Pt has rx for sunglasses but those do   not feel right. Pt has floater OD, comes and goes. Pt denies flashes/pain.   Pt uses allergy gtt PRN.         Last edited by Neva Mooney on 8/25/2022 10:43 AM. (History)        ROS     Positive for: Eyes    Negative for: Constitutional, Gastrointestinal, Neurological, Skin,   Genitourinary, Musculoskeletal, HENT, Endocrine, Cardiovascular,   Respiratory, Psychiatric, Allergic/Imm, Heme/Lymph    Last edited by MICH Garcia, OD on 8/25/2022 11:02 AM. (History)        Assessment /Plan     For exam results, see Encounter Report.    Nuclear sclerosis, bilateral    Chorioretinal scar, bilateral    Glaucoma screening    Myopia with astigmatism and presbyopia, bilateral      1. Early vis sig NS w/ mild psc----OD >OS   Not ready for consult, but probable in 1-2 yrs   Cautions driving, avoid night time  Gave info  2. Longstanding barrier laser repair peripheral OU  Stable w/ no progression  RD precautions given and reviewed. Patient knows to call/ message if any further changes in symptoms occur.  3. Not suspect  4. Updated specs rx, gave copy, fill prn    Discussed and educated patient on current findings /plan.  RTC 1 year, prn if any changes / issues

## 2022-08-31 ENCOUNTER — CLINICAL SUPPORT (OUTPATIENT)
Dept: CARDIOLOGY | Facility: HOSPITAL | Age: 67
End: 2022-08-31
Payer: MEDICARE

## 2022-08-31 DIAGNOSIS — Z95.818 PRESENCE OF OTHER CARDIAC IMPLANTS AND GRAFTS: ICD-10-CM

## 2022-09-07 ENCOUNTER — OFFICE VISIT (OUTPATIENT)
Dept: CARDIOLOGY | Facility: CLINIC | Age: 67
End: 2022-09-07
Payer: MEDICARE

## 2022-09-07 VITALS
BODY MASS INDEX: 23.92 KG/M2 | HEIGHT: 66 IN | SYSTOLIC BLOOD PRESSURE: 122 MMHG | DIASTOLIC BLOOD PRESSURE: 71 MMHG | HEART RATE: 51 BPM | WEIGHT: 148.81 LBS

## 2022-09-07 DIAGNOSIS — E78.2 MIXED HYPERLIPIDEMIA: ICD-10-CM

## 2022-09-07 DIAGNOSIS — I48.0 PAROXYSMAL ATRIAL FIBRILLATION: Primary | ICD-10-CM

## 2022-09-07 DIAGNOSIS — I49.5 TACHY-BRADY SYNDROME: ICD-10-CM

## 2022-09-07 PROCEDURE — 99999 PR PBB SHADOW E&M-EST. PATIENT-LVL III: CPT | Mod: PBBFAC,,, | Performed by: INTERNAL MEDICINE

## 2022-09-07 PROCEDURE — 99214 OFFICE O/P EST MOD 30 MIN: CPT | Mod: S$PBB,,, | Performed by: INTERNAL MEDICINE

## 2022-09-07 PROCEDURE — 99213 OFFICE O/P EST LOW 20 MIN: CPT | Mod: PBBFAC,PO | Performed by: INTERNAL MEDICINE

## 2022-09-07 PROCEDURE — 93010 ELECTROCARDIOGRAM REPORT: CPT | Mod: ,,, | Performed by: INTERNAL MEDICINE

## 2022-09-07 PROCEDURE — 99214 PR OFFICE/OUTPT VISIT, EST, LEVL IV, 30-39 MIN: ICD-10-PCS | Mod: S$PBB,,, | Performed by: INTERNAL MEDICINE

## 2022-09-07 PROCEDURE — 99999 PR PBB SHADOW E&M-EST. PATIENT-LVL III: ICD-10-PCS | Mod: PBBFAC,,, | Performed by: INTERNAL MEDICINE

## 2022-09-07 PROCEDURE — 93005 ELECTROCARDIOGRAM TRACING: CPT | Mod: PO

## 2022-09-07 PROCEDURE — 93010 EKG 12-LEAD: ICD-10-PCS | Mod: ,,, | Performed by: INTERNAL MEDICINE

## 2022-09-07 RX ORDER — FLUTICASONE PROPIONATE 50 MCG
SPRAY, SUSPENSION (ML) NASAL
COMMUNITY
Start: 2022-08-19

## 2022-09-07 NOTE — PROGRESS NOTES
"Subjective:    Patient ID:  Tamica Cardona is a 66 y.o. female who presents for follow-up of Hyperlipidemia and Atrial Fibrillation (6mth f/u)      Problem List Items Addressed This Visit          Cardiac/Vascular    Mixed hyperlipidemia    Paroxysmal atrial fibrillation - Primary    Tachy-steve syndrome       HPI    Patient was last seen on 3/7/22 at which time she was managing lung with CPAP.  Heart rate was bradycardic, so flecainide was stopped, metoprolol was stopped.  P.r.n. metoprolol was started for AFib episodes.  If recurrent AFib occurred while on CPAP therapy, consideration was made to refer for ablation.  Xarelto was continued.    Most recent loop recorder interrogation shows recurrent AFib with some tachycardic episodes.    On assessment today, the patient states that she is still having symptomatic aFib episodes. Almost daily, but definitely worse with stress.    No chest pain.  Some shortness of breath with Afib episodes     Objective:     Vitals:    09/07/22 0938   BP: 122/71   BP Location: Left arm   Patient Position: Sitting   BP Method: Medium (Automatic)   Pulse: (!) 51   Weight: 67.5 kg (148 lb 13 oz)   Height: 5' 5.5" (1.664 m)        Physical Exam  Vitals and nursing note reviewed.   Constitutional:       General: She is not in acute distress.     Appearance: She is well-developed.   HENT:      Head: Normocephalic and atraumatic.   Neck:      Vascular: No JVD.   Cardiovascular:      Rate and Rhythm: Regular rhythm. Bradycardia present.      Heart sounds: Normal heart sounds. No murmur heard.    No friction rub. No gallop.   Pulmonary:      Effort: Pulmonary effort is normal. No respiratory distress.      Breath sounds: Normal breath sounds. No wheezing or rales.   Abdominal:      General: Bowel sounds are normal.      Palpations: Abdomen is soft.      Tenderness: There is no abdominal tenderness. There is no guarding or rebound.   Musculoskeletal:         General: No tenderness.      " Cervical back: Neck supple.   Skin:     General: Skin is warm and dry.   Neurological:      Mental Status: She is alert and oriented to person, place, and time.   Psychiatric:         Behavior: Behavior normal.           Current Outpatient Medications on File Prior to Visit   Medication Sig    acetaminophen (TYLENOL) 500 MG tablet Take 750 mg by mouth every 6 (six) hours as needed for Pain.    ascorbic acid, vitamin C, (VITAMIN C) 500 MG tablet Take 500 mg by mouth once daily.    calcium carbonate (OS-DESTINY) 600 mg (1,500 mg) Tab Take 600 mg by mouth 2 (two) times daily with meals.    EScitalopram oxalate (LEXAPRO) 20 MG tablet Take 1 tablet (20 mg total) by mouth once daily.    ferrous gluconate (FERGON) 325 MG tablet Take 45 mg by mouth daily with breakfast.    fluticasone propionate (FLONASE) 50 mcg/actuation nasal spray     loratadine (CLARITIN) 10 mg tablet Take 10 mg by mouth daily as needed.     meclizine (ANTIVERT) 25 mg tablet Take 1 tablet (25 mg total) by mouth 3 (three) times daily as needed.    metoprolol succinate (TOPROL-XL) 25 MG 24 hr tablet Take 1 tablet (25 mg total) by mouth once daily.    omega 3-dha-epa-fish oil 500-100-1,000 mg Cap Take 1 capsule by mouth once daily.     vitamin D (VITAMIN D3) 1000 units Tab Take 1,000 Units by mouth once daily.    XARELTO 20 mg Tab TAKE 1 TABLET BY MOUTH ONCE DAILY WITH  DINNER  OR  EVENING  MEAL    phenazopyridine (PYRIDIUM) 200 MG tablet Take 1 tablet (200 mg total) by mouth every 6 (six) hours as needed. (Patient not taking: Reported on 9/7/2022)    [DISCONTINUED] triamcinolone (NASACORT) 55 mcg nasal inhaler      No current facility-administered medications on file prior to visit.       Lipid Panel:   Lab Results   Component Value Date    CHOL 240 (H) 10/19/2021    HDL 52 10/19/2021    LDLCALC 163.4 (H) 10/19/2021    TRIG 123 10/19/2021    CHOLHDL 21.7 10/19/2021       The 10-year ASCVD risk score (Mandy ANDREWS, et al., 2019) is: 6.3%    Values used to  calculate the score:      Age: 66 years      Sex: Female      Is Non- : No      Diabetic: No      Tobacco smoker: No      Systolic Blood Pressure: 122 mmHg      Is BP treated: No      HDL Cholesterol: 52 mg/dL      Total Cholesterol: 240 mg/dL    All pertinent labs, imaging, and EKGs reviewed.  Patient's most recent EKG tracing was personally interpreted by this provider.    Most Recent Echocardiogram Results  Results for orders placed in visit on 10/12/20    Echo Color Flow Doppler? Yes    Interpretation Summary  · There is left ventricular concentric remodeling.  · The left ventricle is normal in size with normal systolic function. The estimated ejection fraction is 55%.  · Grade I diastolic dysfunction.  · The estimated PA systolic pressure is 25 mmHg.  · Normal right ventricular systolic function.  · Normal central venous pressure (3 mmHg).  · Mild-to-moderate mitral regurgitation.  · Mild tricuspid regurgitation.  · Mild-to-moderate aortic regurgitation.        Most Recent EKG  Results for orders placed or performed in visit on 03/07/22   IN OFFICE EKG 12-LEAD (to [facility])    Collection Time: 03/07/22  9:48 AM    Narrative    Test Reason : I48.0,    Vent. Rate : 051 BPM     Atrial Rate : 051 BPM     P-R Int : 130 ms          QRS Dur : 072 ms      QT Int : 480 ms       P-R-T Axes : 026 -22 024 degrees     QTc Int : 442 ms    Sinus bradycardia  Otherwise normal ECG  When compared with ECG of 02-NOV-2020 10:47,  No significant change was found  Confirmed by ROSIE PEARL MD ([id number]) on 3/8/2022 12:58:47 PM    Referred By:             Confirmed By:ROSIE PEARL MD       No valid procedures specified.   Results for orders placed during the hospital encounter of 10/19/20    Nuclear Stress - Cardiology Interpreted    Interpretation Summary    The study shows normal myocardial perfusion.    The perfusion scan is free of evidence from myocardial ischemia or injury.    There is a trivial intensity  defect in the anteroapical wall of the left ventricle, secondary to breast attenuation.    Visually estimated ejection fraction is normal at rest.    The EKG portion of this study is negative for ischemia.    The patient reported no chest pain during the stress test.    Arrhythmias during stress: rare PACs.    There are no prior studies for comparison.    No valid procedures specified.    Assessment:       1. Paroxysmal atrial fibrillation    2. Mixed hyperlipidemia    3. Tachy-steve syndrome         Plan:     Symptoms of symptomatic, recurrent aFib, she states it has improved some  BP OK, pulse borderline  Most recent echocardiogram reviewed personally     She is not ready to move forward with ablation at this time, if symptoms worsen, she will let us know  Continue CPAP   Continue Xarelto 20 mg PO Daily to be taken with the largest meal of the day   Continue metoprolol succinate 25 mg PO Daily     Continue other cardiac medications  Mediterranean Diet/Cardiovascular Exercise Program    Patient queried and all questions were answered.    F/u in 6 months to reassess      Signed:    Collin Roberts MD  9/7/2022 7:44 AM

## 2022-09-25 ENCOUNTER — PATIENT MESSAGE (OUTPATIENT)
Dept: FAMILY MEDICINE | Facility: CLINIC | Age: 67
End: 2022-09-25
Payer: MEDICARE

## 2022-09-26 ENCOUNTER — PATIENT MESSAGE (OUTPATIENT)
Dept: FAMILY MEDICINE | Facility: CLINIC | Age: 67
End: 2022-09-26

## 2022-09-26 ENCOUNTER — E-VISIT (OUTPATIENT)
Dept: FAMILY MEDICINE | Facility: CLINIC | Age: 67
End: 2022-09-26
Payer: MEDICARE

## 2022-09-26 DIAGNOSIS — N39.0 URINARY TRACT INFECTION WITHOUT HEMATURIA, SITE UNSPECIFIED: ICD-10-CM

## 2022-09-26 DIAGNOSIS — R30.0 DYSURIA: Primary | ICD-10-CM

## 2022-09-26 PROCEDURE — 99421 PR E&M, ONLINE DIGIT, EST, < 7 DAYS, 5-10 MINS: ICD-10-PCS | Mod: S$GLB,,, | Performed by: NURSE PRACTITIONER

## 2022-09-26 PROCEDURE — 99421 OL DIG E/M SVC 5-10 MIN: CPT | Mod: S$GLB,,, | Performed by: NURSE PRACTITIONER

## 2022-09-26 RX ORDER — NITROFURANTOIN 25; 75 MG/1; MG/1
100 CAPSULE ORAL 2 TIMES DAILY
Qty: 14 CAPSULE | Refills: 0 | Status: SHIPPED | OUTPATIENT
Start: 2022-09-26 | End: 2022-10-10 | Stop reason: ALTCHOICE

## 2022-09-26 NOTE — PROGRESS NOTES
Patient ID: Tamica Cardona is a 66 y.o. female.    Chief Complaint: UTI symptoms    The patient initiated a request through [employer] on 9/26/2022 for evaluation and management with a chief complaint of UTI     I evaluated the questionnaire submission on 09/26/2022    Oh[facility] Peq Evisit Uti Questionnaire    9/26/2022  4:15 PM CDT - Filed by Patient   Do you agree to participate in an E-Visit? Yes   If you have any of the following problems, please present to your local ER or call 911:  I acknowledge   What is the main issue that you would like for your doctor to address today? Painful, frequent, odorous urination   Are you able to take your vital signs? No   What symptoms do you currently have? Pain while passing urine   When did your symptoms first appear? 9/23/2022   List what you have done or taken to help your symptoms. Ive taken over the counter urinary pain relief medication.  Phenazopyridine hydrochloride   Please indicate whether you have had the following symptoms during the past 24 hours     Urgent urination (a sudden and uncontrollable urge to urinate) Severe   Frequent urination of small amounts of urine (going to the toilet very often) Severe   Burning pain when urinating Severe   Incomplete bladder emptying (still feel like you need to urinate again after urination) None   Pain not associated with urination in the lower abdomen below the belly button) None   What does your urine look like? I am not sure   Blood seen in the urine (without menstrual cycle) None   Flank pain (pain in one or both sides of the lower back) None   Abnormal Vaginal Discharge (abnormal amount, color and/or odor) None   Discharge from the urethra (urinary opening) without urination None   High body temperature/fever? None-<99.5   Please rate how much discomfort you have experience because of the symptoms in the past 24 hours: Severe   Please indicate how the symptoms have interfered with your every day activities/work in the past  24 hours: Moderate   Please indicate how these symptoms have interfered with your social activities (visiting people, meeting with friends, etc.) in the past 24 hours? Moderate   Are you a diabetic? No   If you are female, please indicate whether you have the following at the time of completion of this questionnaire: Signs of menopause syndrome (hot flashes)   Is there a chance you could be pregnant? No   Provide any information you feel is important to your history not asked above I had a hysterectomy.  Urine has foul odor.   Please attach any relevant images or files (if you have performed a home test for UTI, please submit a photo of results)           Active Problem List with Overview Notes    Diagnosis Date Noted    Obstructive sleep apnea syndrome 04/23/2022    Tachy-steve syndrome 04/23/2022    Pain in right foot 03/27/2021    Plantar fasciitis 02/22/2021    Heel pain 02/22/2021    Equinus contracture of ankle 02/22/2021    Calcaneal spur, right foot 02/22/2021    Atypical chest pain 10/15/2020    Paroxysmal atrial fibrillation 10/14/2020    Anxiety disorder, unspecified 06/16/2020    Neutropenia     Chronic low back pain 02/01/2019    Plaque psoriasis 10/03/2016     [facility]      Nonintractable episodic headache 10/03/2016    Bilateral hearing loss 10/03/2016     R>L, uses hearing aids      History of breast cancer 11/03/2015    Mixed hyperlipidemia 08/18/2015    Iron deficiency anemia 08/18/2015    Osteopenia 08/18/2015    Retinal tear 02/27/2015    Retinal hole of left eye 02/27/2015    Interstitial cystitis 08/11/2014    Visual field scotoma 04/18/2012    Depression 04/18/2012      Recent Labs Obtained:  No visits with results within 7 Day(s) from this visit.   Latest known visit with results is:   Office Visit on 12/27/2021   Component Date Value Ref Range Status    POC Rapid COVID 12/27/2021 Negative  Negative Final     Acceptable 12/27/2021 Yes   Final    POC Molecular  Influenza A Ag 2021 Negative  Negative, Not Reported Final    POC Molecular Influenza B Ag 2021 Negative  Negative, Not Reported Final     Acceptable 2021 Yes   Final       Encounter Diagnoses   Name Primary?    Dysuria Yes    Urinary tract infection without hematuria, site unspecified         No orders of the defined types were placed in this encounter.     Medications Ordered This Encounter   Medications    nitrofurantoin, macrocrystal-monohydrate, (MACROBID) 100 MG capsule     Sig: Take 1 capsule (100 mg total) by mouth 2 (two) times daily.     Dispense:  14 capsule     Refill:  0        E-Visit Time Trackin minutes      Diagnoses and all orders for this visit:    Dysuria: currently taking Pyridium.     Urinary tract infection without hematuria, site unspecified: unable to do UA due to use of Azo.  If not improving with treatment will order urine culture.    Other orders  -     nitrofurantoin, macrocrystal-monohydrate, (MACROBID) 100 MG capsule; Take 1 capsule (100 mg total) by mouth 2 (two) times daily.

## 2022-09-30 ENCOUNTER — CLINICAL SUPPORT (OUTPATIENT)
Dept: CARDIOLOGY | Facility: HOSPITAL | Age: 67
End: 2022-09-30
Payer: MEDICARE

## 2022-09-30 DIAGNOSIS — Z95.818 PRESENCE OF OTHER CARDIAC IMPLANTS AND GRAFTS: ICD-10-CM

## 2022-09-30 PROCEDURE — 93298 CARDIAC DEVICE CHECK - REMOTE: ICD-10-PCS | Mod: ,,, | Performed by: INTERNAL MEDICINE

## 2022-09-30 PROCEDURE — 93298 REM INTERROG DEV EVAL SCRMS: CPT | Mod: ,,, | Performed by: INTERNAL MEDICINE

## 2022-10-10 ENCOUNTER — OFFICE VISIT (OUTPATIENT)
Dept: FAMILY MEDICINE | Facility: CLINIC | Age: 67
End: 2022-10-10
Payer: MEDICARE

## 2022-10-10 VITALS
OXYGEN SATURATION: 95 % | HEART RATE: 55 BPM | HEIGHT: 66 IN | DIASTOLIC BLOOD PRESSURE: 60 MMHG | RESPIRATION RATE: 18 BRPM | TEMPERATURE: 98 F | WEIGHT: 152 LBS | BODY MASS INDEX: 24.43 KG/M2 | SYSTOLIC BLOOD PRESSURE: 100 MMHG

## 2022-10-10 DIAGNOSIS — Z85.3 HISTORY OF BREAST CANCER: ICD-10-CM

## 2022-10-10 DIAGNOSIS — Z23 IMMUNIZATION DUE: ICD-10-CM

## 2022-10-10 DIAGNOSIS — G47.33 OBSTRUCTIVE SLEEP APNEA SYNDROME: ICD-10-CM

## 2022-10-10 DIAGNOSIS — Z12.31 ENCOUNTER FOR SCREENING MAMMOGRAM FOR MALIGNANT NEOPLASM OF BREAST: ICD-10-CM

## 2022-10-10 DIAGNOSIS — G25.0 BENIGN FAMILIAL TREMOR: ICD-10-CM

## 2022-10-10 DIAGNOSIS — I48.0 PAROXYSMAL ATRIAL FIBRILLATION: ICD-10-CM

## 2022-10-10 DIAGNOSIS — F41.9 ANXIETY DISORDER, UNSPECIFIED TYPE: ICD-10-CM

## 2022-10-10 DIAGNOSIS — Z00.00 ANNUAL PHYSICAL EXAM: Primary | ICD-10-CM

## 2022-10-10 DIAGNOSIS — E78.2 MIXED HYPERLIPIDEMIA: ICD-10-CM

## 2022-10-10 PROBLEM — R07.89 ATYPICAL CHEST PAIN: Status: RESOLVED | Noted: 2020-10-15 | Resolved: 2022-10-10

## 2022-10-10 LAB
ALBUMIN SERPL BCP-MCNC: 3.7 G/DL (ref 3.5–5.2)
ALP SERPL-CCNC: 92 U/L (ref 55–135)
ALT SERPL W/O P-5'-P-CCNC: 25 U/L (ref 10–44)
ANION GAP SERPL CALC-SCNC: 6 MMOL/L (ref 8–16)
AST SERPL-CCNC: 20 U/L (ref 10–40)
BASOPHILS # BLD AUTO: 0.04 K/UL (ref 0–0.2)
BASOPHILS NFR BLD: 1 % (ref 0–1.9)
BILIRUB SERPL-MCNC: 1.3 MG/DL (ref 0.1–1)
BUN SERPL-MCNC: 10 MG/DL (ref 8–23)
CALCIUM SERPL-MCNC: 9.5 MG/DL (ref 8.7–10.5)
CHLORIDE SERPL-SCNC: 108 MMOL/L (ref 95–110)
CHOLEST SERPL-MCNC: 268 MG/DL (ref 120–199)
CHOLEST/HDLC SERPL: 4.8 {RATIO} (ref 2–5)
CO2 SERPL-SCNC: 27 MMOL/L (ref 23–29)
CREAT SERPL-MCNC: 0.9 MG/DL (ref 0.5–1.4)
DIFFERENTIAL METHOD: ABNORMAL
EOSINOPHIL # BLD AUTO: 0.1 K/UL (ref 0–0.5)
EOSINOPHIL NFR BLD: 1.8 % (ref 0–8)
ERYTHROCYTE [DISTWIDTH] IN BLOOD BY AUTOMATED COUNT: 13.4 % (ref 11.5–14.5)
EST. GFR  (NO RACE VARIABLE): >60 ML/MIN/1.73 M^2
GLUCOSE SERPL-MCNC: 84 MG/DL (ref 70–110)
HCT VFR BLD AUTO: 43.5 % (ref 37–48.5)
HDLC SERPL-MCNC: 56 MG/DL (ref 40–75)
HDLC SERPL: 20.9 % (ref 20–50)
HGB BLD-MCNC: 13.9 G/DL (ref 12–16)
IMM GRANULOCYTES # BLD AUTO: 0 K/UL (ref 0–0.04)
IMM GRANULOCYTES NFR BLD AUTO: 0 % (ref 0–0.5)
LDLC SERPL CALC-MCNC: 187.2 MG/DL (ref 63–159)
LYMPHOCYTES # BLD AUTO: 1.1 K/UL (ref 1–4.8)
LYMPHOCYTES NFR BLD: 29.3 % (ref 18–48)
MCH RBC QN AUTO: 30.3 PG (ref 27–31)
MCHC RBC AUTO-ENTMCNC: 32 G/DL (ref 32–36)
MCV RBC AUTO: 95 FL (ref 82–98)
MONOCYTES # BLD AUTO: 0.3 K/UL (ref 0.3–1)
MONOCYTES NFR BLD: 7.3 % (ref 4–15)
NEUTROPHILS # BLD AUTO: 2.3 K/UL (ref 1.8–7.7)
NEUTROPHILS NFR BLD: 60.6 % (ref 38–73)
NONHDLC SERPL-MCNC: 212 MG/DL
NRBC BLD-RTO: 0 /100 WBC
PLATELET # BLD AUTO: 185 K/UL (ref 150–450)
PMV BLD AUTO: 11.4 FL (ref 9.2–12.9)
POTASSIUM SERPL-SCNC: 4.3 MMOL/L (ref 3.5–5.1)
PROT SERPL-MCNC: 6.5 G/DL (ref 6–8.4)
RBC # BLD AUTO: 4.58 M/UL (ref 4–5.4)
SODIUM SERPL-SCNC: 141 MMOL/L (ref 136–145)
TRIGL SERPL-MCNC: 124 MG/DL (ref 30–150)
TSH SERPL DL<=0.005 MIU/L-ACNC: 2.84 UIU/ML (ref 0.4–4)
WBC # BLD AUTO: 3.82 K/UL (ref 3.9–12.7)

## 2022-10-10 PROCEDURE — 80061 LIPID PANEL: CPT | Performed by: NURSE PRACTITIONER

## 2022-10-10 PROCEDURE — G0009 PNEUMOCOCCAL CONJUGATE VACCINE 20-VALENT: ICD-10-PCS | Mod: S$GLB,,, | Performed by: NURSE PRACTITIONER

## 2022-10-10 PROCEDURE — G0009 ADMIN PNEUMOCOCCAL VACCINE: HCPCS | Mod: S$GLB,,, | Performed by: NURSE PRACTITIONER

## 2022-10-10 PROCEDURE — 90677 PNEUMOCOCCAL CONJUGATE VACCINE 20-VALENT: ICD-10-PCS | Mod: S$GLB,,, | Performed by: NURSE PRACTITIONER

## 2022-10-10 PROCEDURE — 36410 PR VENIPUNC NEED PHYS SKILL,DX OR RX: ICD-10-PCS | Mod: S$GLB,,, | Performed by: NURSE PRACTITIONER

## 2022-10-10 PROCEDURE — 84443 ASSAY THYROID STIM HORMONE: CPT | Performed by: NURSE PRACTITIONER

## 2022-10-10 PROCEDURE — 90682 RIV4 VACC RECOMBINANT DNA IM: CPT | Mod: S$GLB,,, | Performed by: NURSE PRACTITIONER

## 2022-10-10 PROCEDURE — 36410 VNPNXR 3YR/> PHY/QHP DX/THER: CPT | Mod: S$GLB,,, | Performed by: NURSE PRACTITIONER

## 2022-10-10 PROCEDURE — 99397 PER PM REEVAL EST PAT 65+ YR: CPT | Mod: GZ,25,S$GLB, | Performed by: NURSE PRACTITIONER

## 2022-10-10 PROCEDURE — 85025 COMPLETE CBC W/AUTO DIFF WBC: CPT | Performed by: NURSE PRACTITIONER

## 2022-10-10 PROCEDURE — G0008 FLU VACCINE - QUADRIVALENT (RECOMBINANT) PRESERVATIVE FREE: ICD-10-PCS | Mod: S$GLB,,, | Performed by: NURSE PRACTITIONER

## 2022-10-10 PROCEDURE — 80053 COMPREHEN METABOLIC PANEL: CPT | Performed by: NURSE PRACTITIONER

## 2022-10-10 PROCEDURE — 90677 PCV20 VACCINE IM: CPT | Mod: S$GLB,,, | Performed by: NURSE PRACTITIONER

## 2022-10-10 PROCEDURE — 99397 PR PREVENTIVE VISIT,EST,65 & OVER: ICD-10-PCS | Mod: GZ,25,S$GLB, | Performed by: NURSE PRACTITIONER

## 2022-10-10 PROCEDURE — G0008 ADMIN INFLUENZA VIRUS VAC: HCPCS | Mod: S$GLB,,, | Performed by: NURSE PRACTITIONER

## 2022-10-10 PROCEDURE — 90682 FLU VACCINE - QUADRIVALENT (RECOMBINANT) PRESERVATIVE FREE: ICD-10-PCS | Mod: S$GLB,,, | Performed by: NURSE PRACTITIONER

## 2022-10-10 RX ORDER — ESCITALOPRAM OXALATE 10 MG/1
15 TABLET ORAL DAILY
Qty: 180 TABLET | Refills: 2 | Status: SHIPPED | OUTPATIENT
Start: 2022-10-10 | End: 2023-11-01 | Stop reason: SDUPTHER

## 2022-10-10 NOTE — PROGRESS NOTES
Subjective:       Patient ID: Tamica Cardona is a 66 y.o. female.    Chief Complaint: Annual Exam    HPI here for annual exam. She has several chronic issues to review. Taking medication as prescribed.  She is due for labs. Due for pneumonia vaccine and mammogram. See ROS.     The following portion of the patients history was reviewed and updated as appropriate: allergies, current medications, past medical and surgical history. Past social history and problem list reviewed. Family PMH and Past social history reviewed. Tobacco, Illicit drug use reviewed.      Review of patient's allergies indicates:   Allergen Reactions    Thimerosal Swelling    Erythromycin base      Other reaction(s): Vomiting  Other reaction(s): Stomach upset  Other reaction(s): Nausea    Penicillins      Other reaction(s): convulsions         Current Outpatient Medications:     acetaminophen (TYLENOL) 500 MG tablet, Take 750 mg by mouth every 6 (six) hours as needed for Pain., Disp: , Rfl:     ascorbic acid, vitamin C, (VITAMIN C) 500 MG tablet, Take 500 mg by mouth once daily., Disp: , Rfl:     calcium carbonate (OS-DESTINY) 600 mg (1,500 mg) Tab, Take 600 mg by mouth 2 (two) times daily with meals., Disp: , Rfl:     EScitalopram oxalate (LEXAPRO) 20 MG tablet, Take 1 tablet (20 mg total) by mouth once daily., Disp: 90 tablet, Rfl: 2    ferrous gluconate (FERGON) 325 MG tablet, Take 45 mg by mouth daily with breakfast., Disp: , Rfl:     fluticasone propionate (FLONASE) 50 mcg/actuation nasal spray, , Disp: , Rfl:     loratadine (CLARITIN) 10 mg tablet, Take 10 mg by mouth daily as needed. , Disp: , Rfl:     meclizine (ANTIVERT) 25 mg tablet, Take 1 tablet (25 mg total) by mouth 3 (three) times daily as needed., Disp: 30 tablet, Rfl: 1    metoprolol succinate (TOPROL-XL) 25 MG 24 hr tablet, Take 1 tablet (25 mg total) by mouth once daily., Disp: 90 tablet, Rfl: 3    nitrofurantoin, macrocrystal-monohydrate, (MACROBID) 100 MG capsule, Take 1  capsule (100 mg total) by mouth 2 (two) times daily., Disp: 14 capsule, Rfl: 0    omega 3-dha-epa-fish oil 500-100-1,000 mg Cap, Take 1 capsule by mouth once daily. , Disp: , Rfl:     phenazopyridine (PYRIDIUM) 200 MG tablet, Take 1 tablet (200 mg total) by mouth every 6 (six) hours as needed. (Patient not taking: Reported on 9/7/2022), Disp: 30 tablet, Rfl: 2    rivaroxaban (XARELTO) 20 mg Tab, Take 1 tablet (20 mg total) by mouth before dinner., Disp: 30 tablet, Rfl: 11    vitamin D (VITAMIN D3) 1000 units Tab, Take 1,000 Units by mouth once daily., Disp: , Rfl:     Past Medical History:   Diagnosis Date    Allergy     Anxiety     Atrial fibrillation 10/12/2020    Breast cancer 11/2003    right    Depression     General anesthetics causing adverse effect in therapeutic use     slow to wake-- easily sedated    History of UTI     Interstitial cystitis 2003    Neutropenia        Past Surgical History:   Procedure Laterality Date    BREAST BIOPSY Left 2006    benign    CYSTOSCOPY  2003    EYE SURGERY  January 2015    HYSTERECTOMY      TVH, ovaries intact. Uterine prolapse    INSERTION OF IMPLANTABLE LOOP RECORDER Left 2/8/2021    Procedure: Insertion, Implantable Loop Recorder;  Surgeon: Collin Roberts MD;  Location: [facility];  Service: Cardiology;  Laterality: Left;    KNEE ARTHROSCOPY W/ MENISCECTOMY  1992    LT    left breast biopsy  2006    benign    MASTECTOMY, RADICAL  12/2003    RT    RETINAL DETACHMENT SURGERY Right        Social History     Socioeconomic History    Marital status:    Tobacco Use    Smoking status: Never    Smokeless tobacco: Never   Substance and Sexual Activity    Alcohol use: No    Drug use: No     Social Determinants of Health     Financial Resource Strain: Low Risk     Difficulty of Paying Living Expenses: Not hard at all   Food Insecurity: No Food Insecurity    Worried About Running Out of Food in the Last Year: Never true    Ran Out of Food in the Last Year: Never true    Transportation Needs: No Transportation Needs    Lack of Transportation (Medical): No    Lack of Transportation (Non-Medical): No   Physical Activity: Insufficiently Active    Days of Exercise per Week: 3 days    Minutes of Exercise per Session: 30 min   Stress: No Stress Concern Present    Feeling of Stress : Only a little   Social Connections: Unknown    Frequency of Communication with Friends and Family: More than three times a week    Frequency of Social Gatherings with Friends and Family: More than three times a week    Active Member of Clubs or Organizations: Yes    Attends Club or Organization Meetings: More than 4 times per year    Marital Status:    Housing Stability: Low Risk     Unable to Pay for Housing in the Last Year: No    Number of Places Lived in the Last Year: 2    Unstable Housing in the Last Year: No       Review of Systems   Constitutional:  Negative for fatigue and fever.   HENT:  Negative for congestion, postnasal drip, rhinorrhea and voice change.    Eyes:  Negative for visual disturbance.   Respiratory:  Negative for cough, chest tightness, shortness of breath and wheezing.    Cardiovascular:  Negative for chest pain, palpitations and leg swelling.   Gastrointestinal:  Negative for abdominal pain, blood in stool, constipation, diarrhea, nausea and vomiting.   Genitourinary:  Negative for difficulty urinating and dysuria.   Musculoskeletal:  Negative for arthralgias, back pain and gait problem.   Skin:  Negative for rash and wound.   Neurological:  Positive for tremors (mild, states her father had them. hands only). Negative for dizziness, weakness and headaches.   Hematological:  Negative for adenopathy. Does not bruise/bleed easily.   Psychiatric/Behavioral:  Negative for decreased concentration, dysphoric mood and sleep disturbance. The patient is nervous/anxious (much improved with lexapro. did not tolerate the 20mg. finds 15mg works best for her).      Objective:      /60  "  Pulse (!) 55   Temp 98.2 °F (36.8 °C) (Temporal)   Resp 18   Ht 5' 5.5" (1.664 m)   Wt 68.9 kg (152 lb 0.1 oz)   SpO2 95%   BMI 24.91 kg/m²      Physical Exam  Constitutional:       General: She is not in acute distress.     Appearance: Normal appearance. She is well-developed and normal weight.   HENT:      Head: Normocephalic.      Nose: Nose normal.      Mouth/Throat:      Mouth: Mucous membranes are moist.      Pharynx: Oropharynx is clear.   Eyes:      Conjunctiva/sclera: Conjunctivae normal.      Pupils: Pupils are equal, round, and reactive to light.   Neck:      Thyroid: No thyromegaly.      Vascular: No carotid bruit.      Trachea: Trachea normal. No tracheal tenderness or tracheal deviation.   Cardiovascular:      Rate and Rhythm: Normal rate and regular rhythm.      Pulses: Normal pulses.           Carotid pulses are 2+ on the right side and 2+ on the left side.       Radial pulses are 2+ on the right side and 2+ on the left side.      Heart sounds: Normal heart sounds. No murmur heard.    No gallop.   Pulmonary:      Effort: Pulmonary effort is normal. No respiratory distress.      Breath sounds: Normal breath sounds. No stridor. No wheezing, rhonchi or rales.   Abdominal:      General: Bowel sounds are normal.      Palpations: Abdomen is soft. There is no splenomegaly or mass.      Tenderness: There is no abdominal tenderness. There is no rebound.   Musculoskeletal:         General: Normal range of motion.      Cervical back: Full passive range of motion without pain, normal range of motion and neck supple. No edema.      Right lower leg: No edema.      Left lower leg: No edema.      Comments: Gait and coordination normal.  strong, equal. Upper and lower extremity strength normal.    Skin:     General: Skin is warm and dry.      Capillary Refill: Capillary refill takes less than 2 seconds.      Findings: No lesion or rash.   Neurological:      General: No focal deficit present.      Mental " Status: She is alert and oriented to person, place, and time.      Motor: Tremor (very fine tremor to hands with extension of arms.) present. No weakness, atrophy or abnormal muscle tone.   Psychiatric:         Attention and Perception: Attention and perception normal.         Mood and Affect: Mood and affect normal.         Speech: Speech normal.         Behavior: Behavior normal.       Assessment:       1. Annual physical exam    2. Mixed hyperlipidemia    3. Paroxysmal atrial fibrillation    4. Encounter for screening mammogram for malignant neoplasm of breast    5. Immunization due    6. Anxiety disorder, unspecified type    7. History of breast cancer    8. Obstructive sleep apnea syndrome    9. Benign familial tremor        Plan:       Annual physical exam    Mixed hyperlipidemia: due for labs.   -     CBC Auto Differential  -     Comprehensive Metabolic Panel  -     Lipid Panel  -     TSH    Paroxysmal atrial fibrillation: followed by Cardiology. On Xarelto.     Encounter for screening mammogram for malignant neoplasm of breast  -     -     Mammo Digital Screening Bilat w/ [facility]o; Future; Expected date: 10/10/2022    Immunization due  -     (In Office Administered) Pneumococcal Conjugate Vaccine (20 Valent) (IM)    Anxiety disorder, unspecified type: good with Lexapro 15mg daily.    History of breast cancer: followed by Oncology. S/p right mastectomy. Due for mammogram.     Obstructive sleep apnea syndrome: compliant with cpap use    Benign familial tremor: does not want referral to Neurology at this time. If symptoms worsen follow up    Other orders  -     Influenza - Quadrivalent (Recombinant) (PF)  -     EScitalopram oxalate (LEXAPRO) 10 MG tablet; Take 1.5 tablets (15 mg total) by mouth once daily.  Dispense: 180 tablet; Refill: 2     Continue current medication  Take medications only as prescribed  Healthy diet, exercise  Adequate rest  Adequate hydration  Avoid allergens  Avoid excessive caffeine       Follow up 6 months.

## 2022-10-10 NOTE — PROGRESS NOTES
Venipuncture performed with 23 gauge butterfly, x's 1 attempt,  to L Antecubital vein.  Specimens collected per orders.      Pressure dressing applied to site, instructed patient to remove dressing in 10-15 minutes, OK to re-adjust dressing if pressure causing any discomfort, to observe closely for numbness and/or discoloration to hand or fingers, and to notify provider if bleeding persists after applying constant pressure lasting 30 minutes.                '

## 2022-10-12 ENCOUNTER — PATIENT MESSAGE (OUTPATIENT)
Dept: FAMILY MEDICINE | Facility: CLINIC | Age: 67
End: 2022-10-12
Payer: MEDICARE

## 2022-10-12 RX ORDER — ROSUVASTATIN CALCIUM 10 MG/1
10 TABLET, COATED ORAL DAILY
Qty: 90 TABLET | Refills: 3 | Status: SHIPPED | OUTPATIENT
Start: 2022-10-12 | End: 2023-10-23

## 2022-10-25 ENCOUNTER — TELEPHONE (OUTPATIENT)
Dept: CARDIOLOGY | Facility: HOSPITAL | Age: 67
End: 2022-10-25
Payer: MEDICARE

## 2022-10-25 NOTE — TELEPHONE ENCOUNTER
"AF with RVR [employer] ILR >1hr and HR >110 noted during device remote check:    Patient symptoms: patient states, some days are worse than others, "when I get it, I am lightheaded", denies any chest pain, c/o SOB and palpitations, 10/22 patient states she was driving back from [place] and was rushed to get home and felt stressed, patient states she notices when she is in AF, some episodes are short    Current burden:  0.8%    Max duration seen: 1hr 24mins on 10/22/22, median V rate 130bpm, multiple episodes noted     Ventricular rates:   overall Vrates controlled, need improvement while in AF        Anticoagulation status:  Xarelto 20mg evening  Toprol-XL 25mg daily    Presenting:                 "

## 2022-10-25 NOTE — TELEPHONE ENCOUNTER
Call placed to patient to discuss referral to Dr. Mejia for PVI  Patient is unsure if she would like to proceed with PVI, LOV 4/2022 with Dr. Mejia, explained that he recommended 6mth f/u  Patient wishes to schedule with KERMIT MENARD for the moment and will consider her options   Patient will schedule through [employer] when she has her calender available

## 2022-10-30 ENCOUNTER — CLINICAL SUPPORT (OUTPATIENT)
Dept: CARDIOLOGY | Facility: HOSPITAL | Age: 67
End: 2022-10-30
Payer: MEDICARE

## 2022-10-30 DIAGNOSIS — Z95.818 PRESENCE OF OTHER CARDIAC IMPLANTS AND GRAFTS: ICD-10-CM

## 2022-11-04 ENCOUNTER — OFFICE VISIT (OUTPATIENT)
Dept: CARDIOLOGY | Facility: CLINIC | Age: 67
End: 2022-11-04
Payer: MEDICARE

## 2022-11-04 VITALS
HEART RATE: 63 BPM | SYSTOLIC BLOOD PRESSURE: 122 MMHG | DIASTOLIC BLOOD PRESSURE: 77 MMHG | BODY MASS INDEX: 24.91 KG/M2 | WEIGHT: 155 LBS | HEIGHT: 66 IN

## 2022-11-04 DIAGNOSIS — E78.2 MIXED HYPERLIPIDEMIA: ICD-10-CM

## 2022-11-04 DIAGNOSIS — G47.33 OBSTRUCTIVE SLEEP APNEA SYNDROME: ICD-10-CM

## 2022-11-04 DIAGNOSIS — I48.0 PAROXYSMAL ATRIAL FIBRILLATION: Primary | ICD-10-CM

## 2022-11-04 PROCEDURE — 99212 OFFICE O/P EST SF 10 MIN: CPT | Mod: PBBFAC,PO | Performed by: PHYSICIAN ASSISTANT

## 2022-11-04 PROCEDURE — 99999 PR PBB SHADOW E&M-EST. PATIENT-LVL II: ICD-10-PCS | Mod: PBBFAC,,, | Performed by: PHYSICIAN ASSISTANT

## 2022-11-04 PROCEDURE — 99214 PR OFFICE/OUTPT VISIT, EST, LEVL IV, 30-39 MIN: ICD-10-PCS | Mod: S$PBB,,, | Performed by: PHYSICIAN ASSISTANT

## 2022-11-04 PROCEDURE — 99999 PR PBB SHADOW E&M-EST. PATIENT-LVL II: CPT | Mod: PBBFAC,,, | Performed by: PHYSICIAN ASSISTANT

## 2022-11-04 PROCEDURE — 99214 OFFICE O/P EST MOD 30 MIN: CPT | Mod: S$PBB,,, | Performed by: PHYSICIAN ASSISTANT

## 2022-11-04 RX ORDER — FLECAINIDE ACETATE 100 MG/1
100 TABLET ORAL EVERY 12 HOURS PRN
Qty: 60 TABLET | Refills: 11 | Status: SHIPPED | OUTPATIENT
Start: 2022-11-04 | End: 2023-11-04

## 2022-11-04 NOTE — PROGRESS NOTES
Subjective:    Patient ID:  Tamica Cardona is a 66 y.o. female who presents for follow-up of atrial fibrillation.       HPI  Ms. Cardona is a very pleasant lady who follows with Dr. Roberts. She has also seen Dr. Mejia in the past. She presents today to discuss her atrial fib. Last week, she was having very frequent episodes; this week is much better. She was previously on flecainide 50mg BID, but it was causing bradycardia and making her very sleepy. She is now maintained on Toprol XL 25mg daily alone. She is anticoagulated with Xarelto.     Her last interrogation showed an AF burden of 0.6%.     At her last visit with Dr. Roberts, he recommended she return to see Dr. Mejia to discuss RFA, but she would prefer to avoid procedures if possible and would like to pursue medical tx.     She has been started on Crestor for HLD.     She is compliant with her CPAP.     Review of Systems   Constitutional: Negative for chills, diaphoresis, fever, weight gain and weight loss.   HENT:  Negative for sore throat.    Eyes:  Negative for blurred vision, vision loss in left eye, vision loss in right eye and visual disturbance.   Cardiovascular:  Positive for palpitations. Negative for chest pain, claudication, dyspnea on exertion, leg swelling, near-syncope, orthopnea, paroxysmal nocturnal dyspnea and syncope.   Respiratory:  Negative for cough, hemoptysis, shortness of breath, sputum production and wheezing.    Endocrine: Negative for cold intolerance and heat intolerance.   Hematologic/Lymphatic: Negative for adenopathy. Does not bruise/bleed easily.   Skin:  Negative for rash.   Musculoskeletal:  Negative for falls, muscle weakness and myalgias.   Gastrointestinal:  Negative for abdominal pain, change in bowel habit, constipation, diarrhea, melena and nausea.   Genitourinary:  Negative for bladder incontinence.   Neurological:  Negative for dizziness, focal weakness, headaches, light-headedness, numbness and  "weakness.   Psychiatric/Behavioral:  Negative for altered mental status.        Vitals:    11/04/22 1341   BP: 122/77   BP Location: Left arm   Patient Position: Sitting   BP Method: Medium (Automatic)   Pulse: 63   Weight: 70.3 kg (154 lb 15.7 oz)   Height: 5' 5.5" (1.664 m)   Body mass index is 25.4 kg/m².    Objective:    Physical Exam  Constitutional:       General: She is not in acute distress.     Appearance: She is well-developed.   HENT:      Head: Normocephalic and atraumatic.   Eyes:      General: No scleral icterus.     Conjunctiva/sclera: Conjunctivae normal.      Pupils: Pupils are equal, round, and reactive to light.   Neck:      Vascular: No JVD.      Trachea: No tracheal deviation.   Cardiovascular:      Rate and Rhythm: Normal rate and regular rhythm.      Heart sounds: No murmur heard.    No friction rub. No gallop.   Pulmonary:      Effort: Pulmonary effort is normal. No respiratory distress.      Breath sounds: Normal breath sounds. No wheezing or rales.   Chest:      Chest wall: No tenderness.   Abdominal:      General: Bowel sounds are normal. There is no distension.      Palpations: Abdomen is soft.      Tenderness: There is no abdominal tenderness.   Musculoskeletal:         General: No tenderness.      Cervical back: Neck supple.   Skin:     General: Skin is warm and dry.      Findings: No erythema or rash.   Neurological:      Mental Status: She is alert and oriented to person, place, and time.   Psychiatric:         Behavior: Behavior normal.         Assessment:       Problem List Items Addressed This Visit          Cardiology Problems    Mixed hyperlipidemia    Paroxysmal atrial fibrillation - Primary       Other    Obstructive sleep apnea syndrome      Plan:     When she is not having frequent AF episodes, she feels quite well, and overall, her burden is low. We discussed resuming flecainide as well as amiodarone (would prefer not to given the side effect profile) and the possibility of " "Tikosyn or Sotalol (and  the hospital admission those would require. For now, she feels using flecainide as a "pill in the pocket" makes the most sense for her. Will use 100mg BID dose given her previous issues with bradycardia and fatigue when taking 50mg BID.   Continue Xarelto 20mg Daily.   Risk factor modification including diet and exercise.   Continue compliance with CPAP.   Avoidance of triggers.   F/U with Dr. Roberts as scheduled.         "

## 2022-11-29 ENCOUNTER — CLINICAL SUPPORT (OUTPATIENT)
Dept: CARDIOLOGY | Facility: HOSPITAL | Age: 67
End: 2022-11-29
Payer: MEDICARE

## 2022-11-29 DIAGNOSIS — Z95.818 PRESENCE OF OTHER CARDIAC IMPLANTS AND GRAFTS: ICD-10-CM

## 2022-11-29 PROCEDURE — 93298 CARDIAC DEVICE CHECK - REMOTE: ICD-10-PCS | Mod: ,,, | Performed by: INTERNAL MEDICINE

## 2022-11-29 PROCEDURE — 93298 REM INTERROG DEV EVAL SCRMS: CPT | Mod: ,,, | Performed by: INTERNAL MEDICINE

## 2022-12-12 ENCOUNTER — HOSPITAL ENCOUNTER (OUTPATIENT)
Dept: RADIOLOGY | Facility: HOSPITAL | Age: 67
Discharge: HOME | End: 2022-12-12
Attending: NURSE PRACTITIONER
Payer: MEDICARE

## 2022-12-12 DIAGNOSIS — Z12.31 ENCOUNTER FOR SCREENING MAMMOGRAM FOR MALIGNANT NEOPLASM OF BREAST: ICD-10-CM

## 2022-12-12 PROCEDURE — 77063 MAMMO DIGITAL SCREENING LEFT WITH TOMO: ICD-10-PCS | Mod: 26,,, | Performed by: RADIOLOGY

## 2022-12-12 PROCEDURE — 77067 MAMMO DIGITAL SCREENING LEFT WITH TOMO: ICD-10-PCS | Mod: 26,,, | Performed by: RADIOLOGY

## 2022-12-12 PROCEDURE — 77063 BREAST TOMOSYNTHESIS BI: CPT | Mod: 26,,, | Performed by: RADIOLOGY

## 2022-12-12 PROCEDURE — 77063 BREAST TOMOSYNTHESIS BI: CPT | Mod: TC,PO

## 2022-12-12 PROCEDURE — 77067 SCR MAMMO BI INCL CAD: CPT | Mod: 26,,, | Performed by: RADIOLOGY

## 2022-12-13 ENCOUNTER — PATIENT MESSAGE (OUTPATIENT)
Dept: FAMILY MEDICINE | Facility: CLINIC | Age: 67
End: 2022-12-13
Payer: MEDICARE

## 2022-12-14 ENCOUNTER — TELEPHONE (OUTPATIENT)
Dept: CARDIOLOGY | Facility: HOSPITAL | Age: 67
End: 2022-12-14
Payer: MEDICARE

## 2022-12-14 NOTE — TELEPHONE ENCOUNTER
"ILR Report  Monitoring period: 11/22/22-12/11/22  Battery Status: good    Device Defined Counters:  Tachy Events:  EGMs illustrate AF with RVR longest lasting 4mins 12secs    Cecilio Events:x 14  EGMs illustrate  daytime SB/ sinus arrhythmia with PACs and PVCs longest lasting 3mins 40secs on 12/13 @ 20:54 median V rate 34 bpm. Pt. Reports she goes to sleep @ night between 9-11 pm            AT/AF burden: 0.8% from 11/22-21/11      AF Events:  EGMs illustrate AF with RVR longest lasting 1hr 48mins median V rate 136 bpm on 12/4 @ 04:33    Heart rate histograms:          BP-122/75       Meds:  Xarelto 20 mg daily  Toprol XL 25 mg daily  Flecainide 100 mg bid prn        Symptoms: She reports she did not recall feeling anything on 12/4, but she was at her son's house  puppy sitting and states she was probably getting up with the puppy. She reports she sometimes gets dizzy/lightheaded and she has to stop what she is doing and put her head down or hold onto something.     Pt. Reports she is not interested in a procedure at this time, she states "she is doing pretty well with medication"    Next f/u 4/26/23    Message sent to Dr. Roberts for review of AF w/ RVR/ bradycardia  "

## 2022-12-29 ENCOUNTER — CLINICAL SUPPORT (OUTPATIENT)
Dept: CARDIOLOGY | Facility: HOSPITAL | Age: 67
End: 2022-12-29
Payer: MEDICARE

## 2022-12-29 DIAGNOSIS — Z95.818 PRESENCE OF OTHER CARDIAC IMPLANTS AND GRAFTS: ICD-10-CM

## 2023-01-03 ENCOUNTER — OFFICE VISIT (OUTPATIENT)
Dept: OPTOMETRY | Facility: CLINIC | Age: 68
End: 2023-01-03
Payer: MEDICARE

## 2023-01-03 DIAGNOSIS — H57.12 EYE PAIN, LEFT: Primary | ICD-10-CM

## 2023-01-03 PROCEDURE — 99213 PR OFFICE/OUTPT VISIT, EST, LEVL III, 20-29 MIN: ICD-10-PCS | Mod: S$PBB,,, | Performed by: OPTOMETRIST

## 2023-01-03 PROCEDURE — 99999 PR PBB SHADOW E&M-EST. PATIENT-LVL III: ICD-10-PCS | Mod: PBBFAC,,, | Performed by: OPTOMETRIST

## 2023-01-03 PROCEDURE — 99213 OFFICE O/P EST LOW 20 MIN: CPT | Mod: PBBFAC,PO | Performed by: OPTOMETRIST

## 2023-01-03 PROCEDURE — 99213 OFFICE O/P EST LOW 20 MIN: CPT | Mod: S$PBB,,, | Performed by: OPTOMETRIST

## 2023-01-03 PROCEDURE — 99999 PR PBB SHADOW E&M-EST. PATIENT-LVL III: CPT | Mod: PBBFAC,,, | Performed by: OPTOMETRIST

## 2023-01-03 NOTE — PROGRESS NOTES
"HPI    Urgent care-eye pain OS    Pt complains of eye pain on and off x 7 weeks. Complains of pressure pain   inside OS. Using otc allergy gtts prn. States pain has subsided last 3   days.   Last edited by Lora Moreland on 1/3/2023  9:26 AM.        ROS    Positive for: Eyes  Negative for: Constitutional, Gastrointestinal, Neurological, Skin,   Genitourinary, Musculoskeletal, HENT, Endocrine, Cardiovascular,   Respiratory, Psychiatric, Allergic/Imm, Heme/Lymph  Last edited by MICH Garcia, OD on 1/3/2023  9:42 AM.        Assessment /Plan     For exam results, see Encounter Report.    Eye pain, left      OS discomfort "inside" OS near nasal canthus  No K/conj / tarsal fb or abrasion noted  IOP/ motilities/ pupils normal   VA stable  No uveitis     Gave suggestions for otc ATs   Otc sinus / nsaids if flare up    Message if not effective ---plan for annual exam end of August 2023                   "

## 2023-01-03 NOTE — PATIENT INSTRUCTIONS
"DRY EYES -- BURNING OR TERESA SYMPTOMS:  Use Over The Counter artificial tears as needed for dry eye symptoms.   Some common brands include:  Systane, Optive, Refresh, and Thera-Tears.  These drops can be used as frequently as desired, but may be most helpful use during long periods of concentrated work.  For example, reading / working at the computer. Start with 3-4x per day.     Nighttime Ophthalmic gel or ointments are available: Refresh PM, Genteal, and Lacrilube.    Avoid drops that "get redness out" (Visine, Murine, Clear Eyes), as these may contain medication that could further irritate the eyes, especially with chronic use.    ALLERGY EYES -- ITCHING SYMPTOMS:  Over the counter medications include--Pataday, Zaditor, and Alaway.  Use as directed 1-2 drops daily for symptoms of itching / watering eyes.  These drops will not help for dry eye or exposure symptoms.    REDNESS RELIEF:  Lumify---is a good redness reliever that will not cause irritation if used chronically.          "

## 2023-01-11 ENCOUNTER — TELEPHONE (OUTPATIENT)
Dept: CARDIOLOGY | Facility: HOSPITAL | Age: 68
End: 2023-01-11
Payer: MEDICARE

## 2023-01-12 ENCOUNTER — TELEPHONE (OUTPATIENT)
Dept: CARDIOLOGY | Facility: HOSPITAL | Age: 68
End: 2023-01-12

## 2023-01-12 NOTE — TELEPHONE ENCOUNTER
"ILR Report  Monitoring period: 12/10/2022- 01/09/2023    Battery Status: Good     Device Defined Counters:    Cecilio Events: 151  EGMs illustrates undersensing, PVCs, PACs, Nocturnal SB, SB, longest available 15mins 20secs  in duration on 12/15/22 @ 15:20 median V rate 36 bpm           AF Events: 38  EGMs illustrates AF/AFL, AF/AFL with RVR, longest available 46mins in duration median V rate 128 bpm  AF burden 0.8%          HR histogram not in AF:    V rates during AF:          Meds:  Flecainide 100 mg bid as needed  Xarelto 20 mg daily  Toprol XL 25 mg daily    Most recent /63 P-83       Symptoms: Pt. Reports she feels like she she has been having less fast heart beats, but does notice when she has a slow heart rate. She feels lightheaded and weak. She says she will sit or lay down for a few minutes then get up and go about her day.Pt. states, " I feel like I am satisfied where I am with medications and the meds are helping me. I know what to do when my heart  rate gets fast or slow, I know what to do"      Message sent to Dr. Roberts for review      "

## 2023-01-23 ENCOUNTER — TELEPHONE (OUTPATIENT)
Dept: CARDIOLOGY | Facility: HOSPITAL | Age: 68
End: 2023-01-23
Payer: MEDICARE

## 2023-01-23 DIAGNOSIS — I48.0 PAROXYSMAL ATRIAL FIBRILLATION: ICD-10-CM

## 2023-01-24 RX ORDER — METOPROLOL SUCCINATE 25 MG/1
12.5 TABLET, EXTENDED RELEASE ORAL DAILY
Qty: 90 TABLET | Refills: 3
Start: 2023-01-24 | End: 2023-08-08

## 2023-01-24 NOTE — TELEPHONE ENCOUNTER
Called pt. And notified her per Dr. Boland decrease Toprol to 12.5 mg daily and notify clinic for lightheadedness, dizziness, shortness of breath, chest pain, pre syncope or syncope. Pt. Verbalized understanding.

## 2023-01-28 ENCOUNTER — CLINICAL SUPPORT (OUTPATIENT)
Dept: CARDIOLOGY | Facility: HOSPITAL | Age: 68
End: 2023-01-28
Payer: MEDICARE

## 2023-01-28 DIAGNOSIS — Z95.818 PRESENCE OF OTHER CARDIAC IMPLANTS AND GRAFTS: ICD-10-CM

## 2023-01-28 PROCEDURE — 93298 REM INTERROG DEV EVAL SCRMS: CPT | Mod: ,,, | Performed by: INTERNAL MEDICINE

## 2023-01-28 PROCEDURE — 93298 CARDIAC DEVICE CHECK - REMOTE: ICD-10-PCS | Mod: ,,, | Performed by: INTERNAL MEDICINE

## 2023-02-13 ENCOUNTER — TELEPHONE (OUTPATIENT)
Dept: CARDIOLOGY | Facility: HOSPITAL | Age: 68
End: 2023-02-13

## 2023-02-13 NOTE — TELEPHONE ENCOUNTER
ILR Report  Monitoring period:1/27/23-2/9/23    Battery Status:Good    Device Defined Counters:  Tachy Event:1  EGMs illustrate AF w/ RVR longest available 48secs in duration    Cecilio Events: 24  EGMs illustrate SB during wake hours, longest available 6mins 30secs in duration on 1/31/2023 @ 20:06 median V rate 36 bpm              AF Events:12  EGMs illustrate AF/AF w/ RVR longest available 5 hrs. (Median V rate 130 bpm) in duration.            AF burden: 1.3%    V rates during AF:      V rates not in AF:          Meds:  Toprol XL 12.5 mg daily (decreased on 1/23 for bradycardia)  Flecainide 100 mg bid PRN  Xarelto 20 mg before dinner    Most recent BP-115/73      Symptoms: pt. Asymptomatic. She said she did visit with her mom and they had a [employer] party and she had 1/2 glass of wine. Pt. Denies feeling lightheaded, dizzy, short of breath. Instructed to notify clinic if symptoms develop. She verbalized understanding      Message sent to Dr. Roberts for review AF w/ RVR/ bradycardia

## 2023-02-27 ENCOUNTER — CLINICAL SUPPORT (OUTPATIENT)
Dept: CARDIOLOGY | Facility: HOSPITAL | Age: 68
End: 2023-02-27
Payer: MEDICARE

## 2023-02-27 DIAGNOSIS — Z95.818 PRESENCE OF OTHER CARDIAC IMPLANTS AND GRAFTS: ICD-10-CM

## 2023-03-01 ENCOUNTER — TELEPHONE (OUTPATIENT)
Dept: CARDIOLOGY | Facility: HOSPITAL | Age: 68
End: 2023-03-01
Payer: MEDICARE

## 2023-03-01 NOTE — TELEPHONE ENCOUNTER
AF w/ RVR noted during ILR remote check:    AT/AF burden:  1.5% 2/13/2023-3/1/2023    Max duration seen: 3 hrs. 2 mins. Median V rate 128 bpm        Most recent episode: 2/22/2023    Ventricular rates:  Needs improvement    V rates not in AF:        Anticoagulation status:  Xarelto    Meds:  Toprol XL 12.5 mg daily (decreased on 1/23 for bradycardia)  Flecainide 100 mg bid PRN  Xarelto 20 mg before dinner      Patient symptoms:She reports she doesn't recall when but she said she remember her heart rate was fast and she had to rest. She also reports she had problems with her CPAP machine and she went to her sleep doctor today to adjust it. She reports overall she has been feeling well and she feels that the medications are working    Most recent /62      Message sent to Dr. Roberts for review

## 2023-03-21 ENCOUNTER — TELEPHONE (OUTPATIENT)
Dept: CARDIOLOGY | Facility: HOSPITAL | Age: 68
End: 2023-03-21
Payer: MEDICARE

## 2023-03-21 NOTE — TELEPHONE ENCOUNTER
AF w/RVR  noted during ILR device remote check:    Overall burden: 2.6% 3/18/2023-3/20/2023    Max duration seen: 1 hr. 42 mins. On 3/17/2023 median V rate 118 bpm              Most recent episode: 3/21/2023    Ventricular rates: Needs improvement  V rates during AF:    V rates not in AF:        Anticoagulation status:  Xarelto    Meds:  Toprol XL 12.5 mg daily (decreased on 1/23 for bradycardia)  Flecainide 100 mg bid PRN  Xarelto 20 mg before dinner    Most recent BP- 130/78      Patient symptoms: She reports she felt her heart beating fast for 3 nights in a row. Her heart rate was 120 when she checked it.She said she was just reading or watching TV @ the time. She said she might have had more tea than normal this weekend. When she felt her heart rate high she took a Flecainide and palpitations subsided approx. 30 mins. Later. She said she cut back on tea today and yesterday.       She reports she really does not want to make any changes now, but will if it gets worse.     Message sent to Dr. Roberts for review

## 2023-03-29 ENCOUNTER — CLINICAL SUPPORT (OUTPATIENT)
Dept: CARDIOLOGY | Facility: HOSPITAL | Age: 68
End: 2023-03-29
Payer: MEDICARE

## 2023-03-29 DIAGNOSIS — Z95.818 PRESENCE OF OTHER CARDIAC IMPLANTS AND GRAFTS: ICD-10-CM

## 2023-03-29 PROCEDURE — 93298 CARDIAC DEVICE CHECK - REMOTE: ICD-10-PCS | Mod: ,,, | Performed by: INTERNAL MEDICINE

## 2023-03-29 PROCEDURE — 93298 REM INTERROG DEV EVAL SCRMS: CPT | Mod: ,,, | Performed by: INTERNAL MEDICINE

## 2023-04-21 ENCOUNTER — OFFICE VISIT (OUTPATIENT)
Dept: FAMILY MEDICINE | Facility: CLINIC | Age: 68
End: 2023-04-21
Payer: MEDICARE

## 2023-04-21 VITALS
SYSTOLIC BLOOD PRESSURE: 122 MMHG | OXYGEN SATURATION: 98 % | HEART RATE: 62 BPM | HEIGHT: 65 IN | RESPIRATION RATE: 18 BRPM | WEIGHT: 145.81 LBS | BODY MASS INDEX: 24.29 KG/M2 | TEMPERATURE: 97 F | DIASTOLIC BLOOD PRESSURE: 72 MMHG

## 2023-04-21 DIAGNOSIS — Z79.899 ENCOUNTER FOR LONG-TERM CURRENT USE OF MEDICATION: ICD-10-CM

## 2023-04-21 DIAGNOSIS — F41.9 ANXIETY DISORDER, UNSPECIFIED TYPE: ICD-10-CM

## 2023-04-21 DIAGNOSIS — J30.9 ALLERGIC RHINITIS, UNSPECIFIED SEASONALITY, UNSPECIFIED TRIGGER: ICD-10-CM

## 2023-04-21 DIAGNOSIS — Z85.3 HISTORY OF BREAST CANCER: ICD-10-CM

## 2023-04-21 DIAGNOSIS — D70.9 NEUTROPENIA, UNSPECIFIED TYPE: ICD-10-CM

## 2023-04-21 DIAGNOSIS — G47.33 OBSTRUCTIVE SLEEP APNEA SYNDROME: ICD-10-CM

## 2023-04-21 DIAGNOSIS — E78.2 MIXED HYPERLIPIDEMIA: Primary | ICD-10-CM

## 2023-04-21 DIAGNOSIS — H90.3 SENSORINEURAL HEARING LOSS (SNHL) OF BOTH EARS: ICD-10-CM

## 2023-04-21 DIAGNOSIS — I48.0 PAROXYSMAL ATRIAL FIBRILLATION: ICD-10-CM

## 2023-04-21 DIAGNOSIS — D50.9 IRON DEFICIENCY ANEMIA, UNSPECIFIED IRON DEFICIENCY ANEMIA TYPE: ICD-10-CM

## 2023-04-21 DIAGNOSIS — F32.A DEPRESSION, UNSPECIFIED DEPRESSION TYPE: ICD-10-CM

## 2023-04-21 LAB
ALBUMIN SERPL BCP-MCNC: 3.7 G/DL (ref 3.5–5.2)
ALP SERPL-CCNC: 81 U/L (ref 55–135)
ALT SERPL W/O P-5'-P-CCNC: 18 U/L (ref 10–44)
ANION GAP SERPL CALC-SCNC: 10 MMOL/L (ref 8–16)
AST SERPL-CCNC: 18 U/L (ref 10–40)
BILIRUB SERPL-MCNC: 1 MG/DL (ref 0.1–1)
BUN SERPL-MCNC: 8 MG/DL (ref 8–23)
CALCIUM SERPL-MCNC: 9.2 MG/DL (ref 8.7–10.5)
CHLORIDE SERPL-SCNC: 109 MMOL/L (ref 95–110)
CHOLEST SERPL-MCNC: 142 MG/DL (ref 120–199)
CHOLEST/HDLC SERPL: 2.9 {RATIO} (ref 2–5)
CO2 SERPL-SCNC: 23 MMOL/L (ref 23–29)
CREAT SERPL-MCNC: 0.8 MG/DL (ref 0.5–1.4)
EST. GFR  (NO RACE VARIABLE): >60 ML/MIN/1.73 M^2
GLUCOSE SERPL-MCNC: 86 MG/DL (ref 70–110)
HDLC SERPL-MCNC: 49 MG/DL (ref 40–75)
HDLC SERPL: 34.5 % (ref 20–50)
IRON SERPL-MCNC: 101 UG/DL (ref 30–160)
LDLC SERPL CALC-MCNC: 78.6 MG/DL (ref 63–159)
NONHDLC SERPL-MCNC: 93 MG/DL
POTASSIUM SERPL-SCNC: 4.4 MMOL/L (ref 3.5–5.1)
PROT SERPL-MCNC: 6.6 G/DL (ref 6–8.4)
SATURATED IRON: 33 % (ref 20–50)
SODIUM SERPL-SCNC: 142 MMOL/L (ref 136–145)
TOTAL IRON BINDING CAPACITY: 309 UG/DL (ref 250–450)
TRANSFERRIN SERPL-MCNC: 209 MG/DL (ref 200–375)
TRIGL SERPL-MCNC: 72 MG/DL (ref 30–150)
TSH SERPL DL<=0.005 MIU/L-ACNC: 2.04 UIU/ML (ref 0.4–4)

## 2023-04-21 PROCEDURE — 36415 PR COLLECTION VENOUS BLOOD,VENIPUNCTURE: ICD-10-PCS | Mod: S$GLB,,, | Performed by: NURSE PRACTITIONER

## 2023-04-21 PROCEDURE — 99214 PR OFFICE/OUTPT VISIT, EST, LEVL IV, 30-39 MIN: ICD-10-PCS | Mod: S$GLB,,, | Performed by: NURSE PRACTITIONER

## 2023-04-21 PROCEDURE — 80053 COMPREHEN METABOLIC PANEL: CPT | Performed by: NURSE PRACTITIONER

## 2023-04-21 PROCEDURE — 99214 OFFICE O/P EST MOD 30 MIN: CPT | Mod: S$GLB,,, | Performed by: NURSE PRACTITIONER

## 2023-04-21 PROCEDURE — 84466 ASSAY OF TRANSFERRIN: CPT | Performed by: NURSE PRACTITIONER

## 2023-04-21 PROCEDURE — 80061 LIPID PANEL: CPT | Performed by: NURSE PRACTITIONER

## 2023-04-21 PROCEDURE — 36415 COLL VENOUS BLD VENIPUNCTURE: CPT | Mod: S$GLB,,, | Performed by: NURSE PRACTITIONER

## 2023-04-21 PROCEDURE — 84443 ASSAY THYROID STIM HORMONE: CPT | Performed by: NURSE PRACTITIONER

## 2023-04-21 NOTE — PROGRESS NOTES
Venipuncture performed with 23 gauge butterfly, x's 1 attempt,  to L Antecubital vein.  Specimens collected per orders.      Pressure dressing applied to site, instructed patient to remove dressing in 10-15 minutes, OK to re-adjust dressing if pressure causing any discomfort, to observe closely for numbness and/or discoloration to hand or fingers, and to notify provider if bleeding persists after applying constant pressure lasting 30 minutes.

## 2023-04-21 NOTE — PROGRESS NOTES
Subjective:       Patient ID: Tamica Cardona is a 67 y.o. female.    Chief Complaint: Follow-up, pressure in ears, and Fatigue    HPI here for 6 month follow up.     Day 10 of URI. Pressure on her ears still.  States other symptoms have improved except for her ears. Not taking any antihistamines. Mild cough from PND. No fever. Does not feel antibiotics are needed.     Saw sleep medicine two weeks ago and had Cpap setting changed. States has been feeling more fatigued. She has history of KEMAL, will check labs.  has not been able to wear her Cpap well due to the sinus congestion she was having, but that is greatly improved.     She is tolerating the statin well. Had high LDL at 187.  Will draw labs today.     Multiple chronic issues to review. See ROS/assessment and plan      The following portion of the patients history was reviewed and updated as appropriate: allergies, current medications, past medical and surgical history. Past social history and problem list reviewed. Family PMH and Past social history reviewed. Tobacco, Illicit drug use reviewed.      Review of patient's allergies indicates:   Allergen Reactions    Thimerosal Swelling    Erythromycin base      Other reaction(s): Vomiting  Other reaction(s): Stomach upset  Other reaction(s): Nausea    Penicillins      Other reaction(s): convulsions         Current Outpatient Medications:     acetaminophen (TYLENOL) 500 MG tablet, Take 750 mg by mouth every 6 (six) hours as needed for Pain., Disp: , Rfl:     ascorbic acid, vitamin C, (VITAMIN C) 500 MG tablet, Take 500 mg by mouth once daily., Disp: , Rfl:     calcium carbonate (OS-DESTINY) 600 mg (1,500 mg) Tab, Take 600 mg by mouth 2 (two) times daily with meals., Disp: , Rfl:     EScitalopram oxalate (LEXAPRO) 10 MG tablet, Take 1.5 tablets (15 mg total) by mouth once daily., Disp: 180 tablet, Rfl: 2    ferrous gluconate (FERGON) 325 MG tablet, Take 45 mg by mouth daily with breakfast., Disp: , Rfl:      flecainide (TAMBOCOR) 100 MG Tab, Take 1 tablet (100 mg total) by mouth every 12 (twelve) hours as needed., Disp: 60 tablet, Rfl: 11    fluticasone propionate (FLONASE) 50 mcg/actuation nasal spray, , Disp: , Rfl:     loratadine (CLARITIN) 10 mg tablet, Take 10 mg by mouth daily as needed. , Disp: , Rfl:     meclizine (ANTIVERT) 25 mg tablet, Take 1 tablet (25 mg total) by mouth 3 (three) times daily as needed., Disp: 30 tablet, Rfl: 1    metoprolol succinate (TOPROL-XL) 25 MG 24 hr tablet, Take 0.5 tablets (12.5 mg total) by mouth once daily., Disp: 90 tablet, Rfl: 3    omega 3-dha-epa-fish oil 500-100-1,000 mg Cap, Take 1 capsule by mouth once daily. , Disp: , Rfl:     rivaroxaban (XARELTO) 20 mg Tab, Take 1 tablet (20 mg total) by mouth before dinner., Disp: 30 tablet, Rfl: 11    rosuvastatin (CRESTOR) 10 MG tablet, Take 1 tablet (10 mg total) by mouth once daily., Disp: 90 tablet, Rfl: 3    vitamin D (VITAMIN D3) 1000 units Tab, Take 1,000 Units by mouth once daily., Disp: , Rfl:     Past Medical History:   Diagnosis Date    Allergy     Anxiety     Atrial fibrillation 10/12/2020    Breast cancer 11/2003    right    Depression     General anesthetics causing adverse effect in therapeutic use     slow to wake-- easily sedated    History of UTI     Interstitial cystitis 2003    Neutropenia        Past Surgical History:   Procedure Laterality Date    BREAST BIOPSY Left 2006    benign    CYSTOSCOPY  2003    EYE SURGERY  01/2015    HYSTERECTOMY      TVH, ovaries intact. Uterine prolapse    INSERTION OF IMPLANTABLE LOOP RECORDER Left 02/08/2021    Procedure: Insertion, Implantable Loop Recorder;  Surgeon: Collin Roberts MD;  Location: [facility];  Service: Cardiology;  Laterality: Left;    KNEE ARTHROSCOPY W/ MENISCECTOMY  1992    LT    left breast biopsy  2006    benign    MASTECTOMY, RADICAL Right 12/2003    RETINAL DETACHMENT SURGERY Right        Social History     Socioeconomic History    Marital status:     Tobacco Use    Smoking status: Never    Smokeless tobacco: Never   Substance and Sexual Activity    Alcohol use: No    Drug use: No     Social Determinants of Health     Financial Resource Strain: Low Risk     Difficulty of Paying Living Expenses: Not hard at all   Food Insecurity: No Food Insecurity    Worried About Running Out of Food in the Last Year: Never true    Ran Out of Food in the Last Year: Never true   Transportation Needs: No Transportation Needs    Lack of Transportation (Medical): No    Lack of Transportation (Non-Medical): No   Physical Activity: Insufficiently Active    Days of Exercise per Week: 2 days    Minutes of Exercise per Session: 40 min   Stress: No Stress Concern Present    Feeling of Stress : Only a little   Social Connections: Unknown    Frequency of Communication with Friends and Family: More than three times a week    Frequency of Social Gatherings with Friends and Family: Twice a week    Active Member of Clubs or Organizations: Yes    Attends Club or Organization Meetings: More than 4 times per year    Marital Status:    Housing Stability: Low Risk     Unable to Pay for Housing in the Last Year: No    Number of Places Lived in the Last Year: 2    Unstable Housing in the Last Year: No     Review of Systems   Constitutional:  Positive for fatigue. Negative for fever.   HENT:  Negative for congestion, postnasal drip, rhinorrhea, sinus pressure, sore throat and voice change.         Bilateral ear pressure   Eyes:  Negative for visual disturbance.   Respiratory:  Positive for cough (mild. clear mucous). Negative for chest tightness, shortness of breath and wheezing.    Cardiovascular:  Negative for chest pain, palpitations and leg swelling.   Gastrointestinal:  Negative for abdominal pain, blood in stool, constipation, diarrhea, nausea and vomiting.   Genitourinary:  Negative for difficulty urinating and dysuria.   Musculoskeletal:  Positive for arthralgias (no new issues.  "stable). Negative for back pain and gait problem.   Neurological:  Negative for dizziness, weakness and headaches.   Hematological:  Negative for adenopathy. Does not bruise/bleed easily.   Psychiatric/Behavioral:  Negative for decreased concentration, dysphoric mood and sleep disturbance. The patient is not nervous/anxious.      Objective:      /72 (BP Location: Left arm, Patient Position: Sitting)   Pulse 62   Temp 97.3 °F (36.3 °C)   Resp 18   Ht 5' 5" (1.651 m)   Wt 66.2 kg (145 lb 13.4 oz)   SpO2 98%   BMI 24.27 kg/m²      Physical Exam  Vitals reviewed.   Constitutional:       General: She is not in acute distress.     Appearance: Normal appearance. She is well-developed and normal weight.   HENT:      Head: Normocephalic.      Right Ear: Ear canal and external ear normal. Tympanic membrane is injected.      Left Ear: Ear canal and external ear normal. Tympanic membrane is injected.      Nose: Rhinorrhea present. No congestion.      Right Sinus: No maxillary sinus tenderness.      Left Sinus: No maxillary sinus tenderness.      Mouth/Throat:      Mouth: Mucous membranes are moist.      Pharynx: Oropharynx is clear.   Eyes:      Conjunctiva/sclera: Conjunctivae normal.      Pupils: Pupils are equal, round, and reactive to light.   Neck:      Thyroid: No thyromegaly.      Trachea: Trachea normal. No tracheal tenderness or tracheal deviation.   Cardiovascular:      Rate and Rhythm: Normal rate and regular rhythm.      Pulses: Normal pulses.           Carotid pulses are 2+ on the right side and 2+ on the left side.       Radial pulses are 2+ on the right side and 2+ on the left side.      Heart sounds: Normal heart sounds. No murmur heard.    No gallop.   Pulmonary:      Effort: Pulmonary effort is normal. No respiratory distress.      Breath sounds: Normal breath sounds. No stridor. No wheezing, rhonchi or rales.   Abdominal:      General: Bowel sounds are normal.      Palpations: Abdomen is soft. " There is no splenomegaly or mass.      Tenderness: There is no abdominal tenderness. There is no guarding or rebound.   Musculoskeletal:         General: Normal range of motion.      Cervical back: Full passive range of motion without pain, normal range of motion and neck supple. No edema.      Right lower leg: No edema.      Left lower leg: No edema.      Comments: Gait and coordination normal.  strong, equal. Upper and lower extremity strength normal.    Skin:     General: Skin is warm and dry.      Capillary Refill: Capillary refill takes less than 2 seconds.      Findings: No lesion or rash.   Neurological:      General: No focal deficit present.      Mental Status: She is alert and oriented to person, place, and time.      Gait: Gait normal.   Psychiatric:         Attention and Perception: Attention and perception normal.         Mood and Affect: Mood and affect normal.         Speech: Speech normal.         Behavior: Behavior normal.       Assessment:       1. Mixed hyperlipidemia    2. Neutropenia, unspecified type    3. Paroxysmal atrial fibrillation    4. Anxiety disorder, unspecified type    5. Depression, unspecified depression type    6. History of breast cancer    7. Obstructive sleep apnea syndrome    8. Encounter for long-term current use of medication    9. Iron deficiency anemia, unspecified iron deficiency anemia type    10. Allergic rhinitis, unspecified seasonality, unspecified trigger    11. Sensorineural hearing loss (SNHL) of both ears        Plan:       Mixed hyperlipidemia: tolerating low dose Crestor.   -     Lipid Panel    Neutropenia, unspecified type: has been stable. Repeat labs    Paroxysmal atrial fibrillation: followed by Cardiology. Stable in NSR on Flecainide.  Metorpolol.  On Xarelto    Anxiety disorder, unspecified type: stable on Lexapro 15mg.     Depression, unspecified depression type: stable on Lexapro    History of breast cancer: followed by Oncology    Obstructive  sleep apnea syndrome: followed by Sleep medicine. Settings recently adjusted.    Encounter for long-term current use of medication  -     Comprehensive Metabolic Panel  -     TSH    Iron deficiency anemia, unspecified iron deficiency anemia type: taking iron supplement daily. Check labs.  -     Iron and TIBC    Allergic rhinitis, unspecified seasonality, unspecified trigger: use flonase daily. Take claritin. If pressure on ears and PND does not improve over the next week let me know.  No indication that antibiotics are needed at this time. If symptoms continue for longer than a week or worsen, follow up.     Hearing loss bilaterally: wearing hearing aides.      Continue current medication  Take medications only as prescribed  Healthy diet, exercise  Adequate rest  Adequate hydration  Avoid allergens  Avoid excessive caffeine      Follow up 6 months.

## 2023-04-24 NOTE — PROGRESS NOTES
"Subjective:    Patient ID:  Tamica Cardona is a 67 y.o. female who presents for follow-up of Atrial Fibrillation and Hyperlipidemia      Problem List Items Addressed This Visit          Cardiac/Vascular    Mixed hyperlipidemia - Primary    Paroxysmal atrial fibrillation    Tachy-steve syndrome       HPI    Patient was last seen on 09/07/2022 at which time she was evaluated for recurrent AFib episodes.  Referral for ablation evaluation was offered, but patient was not amenable at that time.    On assessment today, the patient states that she feels well from a cardiac standpoint    No chest pain.  No shortness of breath.      Cut down iced tea from 3 glasses to 1 and symptoms have essentially resolved  Has not needed flecainide in a few weeks     CPAP use - Using it       Objective:     Vitals:    04/26/23 1024   BP: 104/70   BP Location: Left arm   Patient Position: Sitting   BP Method: Medium (Automatic)   Pulse: (!) 56   Weight: 67.2 kg (148 lb 2.4 oz)   Height: 5' 5" (1.651 m)       BP Readings from Last 5 Encounters:   04/26/23 104/70   04/21/23 122/72   11/04/22 122/77   10/10/22 100/60   09/07/22 122/71        Physical Exam  Vitals and nursing note reviewed.   Constitutional:       General: She is not in acute distress.     Appearance: She is well-developed.   HENT:      Head: Normocephalic and atraumatic.   Neck:      Vascular: No JVD.   Cardiovascular:      Rate and Rhythm: Normal rate and regular rhythm.      Heart sounds: Normal heart sounds. No murmur heard.    No friction rub. No gallop.   Pulmonary:      Effort: Pulmonary effort is normal. No respiratory distress.      Breath sounds: Normal breath sounds. No wheezing or rales.   Abdominal:      General: Bowel sounds are normal.      Palpations: Abdomen is soft.      Tenderness: There is no abdominal tenderness. There is no guarding or rebound.   Musculoskeletal:         General: No tenderness.      Cervical back: Neck supple.   Skin:     General: Skin " is warm and dry.   Neurological:      Mental Status: She is alert and oriented to person, place, and time.   Psychiatric:         Behavior: Behavior normal.           Current Outpatient Medications   Medication Instructions    acetaminophen (TYLENOL) 750 mg, Oral, Every 6 hours PRN    ascorbic acid (vitamin C) (VITAMIN C) 500 mg, Oral, Daily    calcium carbonate (OS-DESTINY) 600 mg, Oral, 2 times daily with meals    EScitalopram oxalate (LEXAPRO) 15 mg, Oral, Daily    ferrous gluconate (FERGON) 45 mg, Oral, With breakfast    flecainide (TAMBOCOR) 100 mg, Oral, Every 12 hours PRN    fluticasone propionate (FLONASE) 50 mcg/actuation nasal spray No dose, route, or frequency recorded.    loratadine (CLARITIN) 10 mg, Oral, Daily PRN    meclizine (ANTIVERT) 25 mg, Oral, 3 times daily PRN    metoprolol succinate (TOPROL-XL) 12.5 mg, Oral, Daily    omega 3-dha-epa-fish oil 500-100-1,000 mg Cap 1 capsule, Oral, Daily    rivaroxaban (XARELTO) 20 mg, Oral, Before dinner    rosuvastatin (CRESTOR) 10 mg, Oral, Daily    vitamin D (VITAMIN D3) 1,000 Units, Oral, Daily       Lipid Panel:   Lab Results   Component Value Date    CHOL 142 04/21/2023    HDL 49 04/21/2023    LDLCALC 78.6 04/21/2023    TRIG 72 04/21/2023    CHOLHDL 34.5 04/21/2023       The 10-year ASCVD risk score (Mandy ANDREWS, et al., 2019) is: 4.2%    Values used to calculate the score:      Age: 67 years      Sex: Female      Is Non- : No      Diabetic: No      Tobacco smoker: No      Systolic Blood Pressure: 104 mmHg      Is BP treated: No      HDL Cholesterol: 49 mg/dL      Total Cholesterol: 142 mg/dL    All pertinent labs, imaging, and EKGs reviewed.  Patient's most recent EKG tracing was personally interpreted by this provider.    Most Recent EKG Results  Results for orders placed or performed in visit on 09/07/22   IN OFFICE EKG 12-LEAD (to [place])    Collection Time: 09/07/22  8:43 AM    Narrative    Test Reason : Z00.00    Vent. Rate : 057 BPM      Atrial Rate : 057 BPM     P-R Int : 116 ms          QRS Dur : 064 ms      QT Int : 476 ms       P-R-T Axes : 054 -22 055 degrees     QTc Int : 463 ms    Sinus bradycardia  Otherwise normal ECG  When compared with ECG of 07-MAR-2022 09:48,  No significant change was found  Confirmed by ROSIE PEARL MD ([id number]) on 9/8/2022 12:03:12 PM    Referred By: RASHID LEE           Confirmed By:ROSIE PEARL MD       Most Recent Echocardiogram Results  Results for orders placed in visit on 10/12/20    Echo Color Flow Doppler? Yes    Interpretation Summary  · There is left ventricular concentric remodeling.  · The left ventricle is normal in size with normal systolic function. The estimated ejection fraction is 55%.  · Grade I diastolic dysfunction.  · The estimated PA systolic pressure is 25 mmHg.  · Normal right ventricular systolic function.  · Normal central venous pressure (3 mmHg).  · Mild-to-moderate mitral regurgitation.  · Mild tricuspid regurgitation.  · Mild-to-moderate aortic regurgitation.      Most Recent Nuclear Stress Test Results  Results for orders placed during the hospital encounter of 10/19/20    Nuclear Stress - Cardiology Interpreted    Interpretation Summary    The study shows normal myocardial perfusion.    The perfusion scan is free of evidence from myocardial ischemia or injury.    There is a trivial intensity defect in the anteroapical wall of the left ventricle, secondary to breast attenuation.    Visually estimated ejection fraction is normal at rest.    The EKG portion of this study is negative for ischemia.    The patient reported no chest pain during the stress test.    Arrhythmias during stress: rare PACs.    There are no prior studies for comparison.      Most Recent Cardiac PET Stress Test Results  No results found for this or any previous visit.      Most Recent Cardiovascular Angiogram results  No results found for this or any previous visit.      Other Most Recent Cardiology  Results  Results for orders placed in visit on 04/28/23    Cardiac device check - Remote    Narrative  Battery and Leads (BL)  Normal battery parameters    Presenting Rhythm (MO)  Normal Sinus Rhythm  Premature Atrial Contraction(s) on presenting rhythm    Arrhythmic events (AE)  Bradycardia event(s) recorded  Nocturnal Sinus Bradycardia  Atrial fibrillation and/or flutter with rapid ventricular rate  AF event(s) recorded  Undersensing is noted  Paroxysmal atrial fibrillation and/or flutter    Anticoagulation  (AC)  Patient prescribed Apixaban (Eliquis)  Patient on anticoagulant therapy    Transmission Information (TI)  Implant indication: Atrial Fibrillation Management  Device Summary Report    Follow Up (FU)  Continue remote monitoring with monthly reporting    Additional Comments  ILR Report  Monitoring period:3/10/23-4/9/23    Battery Status:Good    Device Defined Counters:  Tachy Event:1  EGM illustrate AF w/ RVR, longest available 6secs in duration.    Steve Events:8  EGMs illustrate Nocturnal SB, longest available 36secs in duration.    AF Events: 20  EGMs illustrate AF/AFL, AF w/ RVR, undersensing, longest available 1hours 42mins in duration.  AF burden: 0.6%        Assessment:       1. Mixed hyperlipidemia    2. Paroxysmal atrial fibrillation    3. Tachy-steve syndrome         Plan:     Symptoms OK today  BP/Pulse OK today  Most recent echocardiogram reviewed personally     Continue CPAP use   Continue flecainide pill in pocket strategy   Continue metoprolol succinate 12.5 mg PO Daily  Continue Xarelto 20 mg PO Daily to be taken with the largest meal of the day   Continue Crestor 10 mg PO Daily  Echocardiogram prior to next visit     Continue other cardiac medications  Mediterranean Diet/Cardiovascular Exercise Program    Patient queried and all questions were answered.    F/u in 9 months to reassess with echo prior      Signed:    Collin Roberts MD  4/26/2023 4:39 PM

## 2023-04-25 ENCOUNTER — PATIENT MESSAGE (OUTPATIENT)
Dept: FAMILY MEDICINE | Facility: CLINIC | Age: 68
End: 2023-04-25
Payer: MEDICARE

## 2023-04-26 ENCOUNTER — OFFICE VISIT (OUTPATIENT)
Dept: CARDIOLOGY | Facility: CLINIC | Age: 68
End: 2023-04-26
Payer: MEDICARE

## 2023-04-26 VITALS
HEART RATE: 56 BPM | SYSTOLIC BLOOD PRESSURE: 104 MMHG | HEIGHT: 65 IN | DIASTOLIC BLOOD PRESSURE: 70 MMHG | WEIGHT: 148.13 LBS | BODY MASS INDEX: 24.68 KG/M2

## 2023-04-26 DIAGNOSIS — I48.0 PAROXYSMAL ATRIAL FIBRILLATION: ICD-10-CM

## 2023-04-26 DIAGNOSIS — E78.2 MIXED HYPERLIPIDEMIA: Primary | ICD-10-CM

## 2023-04-26 DIAGNOSIS — I49.5 TACHY-BRADY SYNDROME: ICD-10-CM

## 2023-04-26 PROCEDURE — 99999 PR PBB SHADOW E&M-EST. PATIENT-LVL III: CPT | Mod: PBBFAC,,, | Performed by: INTERNAL MEDICINE

## 2023-04-26 PROCEDURE — 99214 OFFICE O/P EST MOD 30 MIN: CPT | Mod: S$PBB,25,, | Performed by: INTERNAL MEDICINE

## 2023-04-26 PROCEDURE — 99214 PR OFFICE/OUTPT VISIT, EST, LEVL IV, 30-39 MIN: ICD-10-PCS | Mod: S$PBB,25,, | Performed by: INTERNAL MEDICINE

## 2023-04-26 PROCEDURE — 93010 ELECTROCARDIOGRAM REPORT: CPT | Mod: ,,, | Performed by: INTERNAL MEDICINE

## 2023-04-26 PROCEDURE — 99999 PR PBB SHADOW E&M-EST. PATIENT-LVL III: ICD-10-PCS | Mod: PBBFAC,,, | Performed by: INTERNAL MEDICINE

## 2023-04-26 PROCEDURE — 99213 OFFICE O/P EST LOW 20 MIN: CPT | Mod: PBBFAC,PO | Performed by: INTERNAL MEDICINE

## 2023-04-26 PROCEDURE — 93010 EKG 12-LEAD: ICD-10-PCS | Mod: ,,, | Performed by: INTERNAL MEDICINE

## 2023-04-26 PROCEDURE — 93005 ELECTROCARDIOGRAM TRACING: CPT | Mod: PO

## 2023-04-28 ENCOUNTER — CLINICAL SUPPORT (OUTPATIENT)
Dept: CARDIOLOGY | Facility: HOSPITAL | Age: 68
End: 2023-04-28
Payer: MEDICARE

## 2023-04-28 DIAGNOSIS — Z95.818 PRESENCE OF OTHER CARDIAC IMPLANTS AND GRAFTS: ICD-10-CM

## 2023-05-28 ENCOUNTER — CLINICAL SUPPORT (OUTPATIENT)
Dept: CARDIOLOGY | Facility: HOSPITAL | Age: 68
End: 2023-05-28
Payer: MEDICARE

## 2023-05-28 DIAGNOSIS — Z95.818 PRESENCE OF OTHER CARDIAC IMPLANTS AND GRAFTS: ICD-10-CM

## 2023-05-28 PROCEDURE — 93298 REM INTERROG DEV EVAL SCRMS: CPT | Mod: ,,, | Performed by: INTERNAL MEDICINE

## 2023-05-28 PROCEDURE — 93298 CARDIAC DEVICE CHECK - REMOTE: ICD-10-PCS | Mod: ,,, | Performed by: INTERNAL MEDICINE

## 2023-06-27 ENCOUNTER — CLINICAL SUPPORT (OUTPATIENT)
Dept: CARDIOLOGY | Facility: HOSPITAL | Age: 68
End: 2023-06-27
Payer: MEDICARE

## 2023-06-27 DIAGNOSIS — Z95.818 PRESENCE OF OTHER CARDIAC IMPLANTS AND GRAFTS: ICD-10-CM

## 2023-06-29 ENCOUNTER — TELEPHONE (OUTPATIENT)
Dept: CARDIOLOGY | Facility: HOSPITAL | Age: 68
End: 2023-06-29
Payer: MEDICARE

## 2023-06-29 NOTE — TELEPHONE ENCOUNTER
AF w/ RVR noted during ILR device remote check:    Overall burden: 3.4 % (6/25-6/28)    Max duration seen: 2 hrs. 20 mins                Most recent episode: 6/28              Ventricular rates:  Needs improvement      V rates not in AF:        Anticoagulation status:  Xarelto    Patient symptoms: She reports she felt heart beating fast which made it hard to breath. She reports she took a Flecainide. Pt. Reports she is still content with current plan of care    Meds:  Flecainide 100 mg bid as needed  Xarelto 20 mg   Toprol Xl 12.5 mg daily        Message sent to Dr. Roberts for review

## 2023-07-07 ENCOUNTER — TELEPHONE (OUTPATIENT)
Dept: CARDIOLOGY | Facility: HOSPITAL | Age: 68
End: 2023-07-07
Payer: MEDICARE

## 2023-07-14 NOTE — TELEPHONE ENCOUNTER
Atrial fibrillation >1hr HR >110 noted during device remote check:    Current burden:  1.9%    Patient symptoms: patient denies any s/s, states she was sleeping at the time    Max duration seen: 1hr 32mins MVR 115bpm 7/3/23 at 2:25am          Ventricular rates:   overall Controlled, while in AF Needs improvement      Anticoagulation status:  Xarelto 20mg evening  Flecainide 100mg BID  Toprol-XL 25mg daily

## 2023-07-25 ENCOUNTER — PATIENT MESSAGE (OUTPATIENT)
Dept: FAMILY MEDICINE | Facility: CLINIC | Age: 68
End: 2023-07-25
Payer: MEDICARE

## 2023-07-26 ENCOUNTER — PATIENT MESSAGE (OUTPATIENT)
Dept: FAMILY MEDICINE | Facility: CLINIC | Age: 68
End: 2023-07-26
Payer: MEDICARE

## 2023-07-27 ENCOUNTER — CLINICAL SUPPORT (OUTPATIENT)
Dept: CARDIOLOGY | Facility: HOSPITAL | Age: 68
End: 2023-07-27
Payer: MEDICARE

## 2023-07-27 DIAGNOSIS — Z95.818 PRESENCE OF OTHER CARDIAC IMPLANTS AND GRAFTS: ICD-10-CM

## 2023-07-27 PROCEDURE — 93298 CARDIAC DEVICE CHECK - REMOTE: ICD-10-PCS | Mod: ,,, | Performed by: INTERNAL MEDICINE

## 2023-07-27 PROCEDURE — 93298 REM INTERROG DEV EVAL SCRMS: CPT | Mod: ,,, | Performed by: INTERNAL MEDICINE

## 2023-07-28 ENCOUNTER — OFFICE VISIT (OUTPATIENT)
Dept: FAMILY MEDICINE | Facility: CLINIC | Age: 68
End: 2023-07-28
Payer: MEDICARE

## 2023-07-28 ENCOUNTER — PATIENT MESSAGE (OUTPATIENT)
Dept: FAMILY MEDICINE | Facility: CLINIC | Age: 68
End: 2023-07-28

## 2023-07-28 ENCOUNTER — HOSPITAL ENCOUNTER (OUTPATIENT)
Dept: RADIOLOGY | Facility: HOSPITAL | Age: 68
Discharge: HOME | End: 2023-07-28
Attending: NURSE PRACTITIONER
Payer: MEDICARE

## 2023-07-28 VITALS
SYSTOLIC BLOOD PRESSURE: 120 MMHG | HEIGHT: 65 IN | OXYGEN SATURATION: 97 % | WEIGHT: 152.25 LBS | HEART RATE: 61 BPM | TEMPERATURE: 99 F | BODY MASS INDEX: 25.37 KG/M2 | DIASTOLIC BLOOD PRESSURE: 66 MMHG

## 2023-07-28 DIAGNOSIS — G89.29 CHRONIC MIDLINE LOW BACK PAIN WITHOUT SCIATICA: Primary | ICD-10-CM

## 2023-07-28 DIAGNOSIS — M25.552 BILATERAL HIP PAIN: ICD-10-CM

## 2023-07-28 DIAGNOSIS — M25.551 BILATERAL HIP PAIN: ICD-10-CM

## 2023-07-28 DIAGNOSIS — M54.50 CHRONIC MIDLINE LOW BACK PAIN WITHOUT SCIATICA: Primary | ICD-10-CM

## 2023-07-28 DIAGNOSIS — M54.50 CHRONIC MIDLINE LOW BACK PAIN WITHOUT SCIATICA: ICD-10-CM

## 2023-07-28 DIAGNOSIS — G89.29 CHRONIC MIDLINE LOW BACK PAIN WITHOUT SCIATICA: ICD-10-CM

## 2023-07-28 PROCEDURE — 73521 X-RAY EXAM HIPS BI 2 VIEWS: CPT | Mod: TC,FY,PO

## 2023-07-28 PROCEDURE — 99213 PR OFFICE/OUTPT VISIT, EST, LEVL III, 20-29 MIN: ICD-10-PCS | Mod: S$GLB,,, | Performed by: NURSE PRACTITIONER

## 2023-07-28 PROCEDURE — 72100 XR LUMBAR SPINE AP AND LATERAL: ICD-10-PCS | Mod: 26,,, | Performed by: RADIOLOGY

## 2023-07-28 PROCEDURE — 73521 X-RAY EXAM HIPS BI 2 VIEWS: CPT | Mod: 26,,, | Performed by: RADIOLOGY

## 2023-07-28 PROCEDURE — 72100 X-RAY EXAM L-S SPINE 2/3 VWS: CPT | Mod: TC,FY,PO

## 2023-07-28 PROCEDURE — 99213 OFFICE O/P EST LOW 20 MIN: CPT | Mod: S$GLB,,, | Performed by: NURSE PRACTITIONER

## 2023-07-28 PROCEDURE — 73521 XR HIPS BILATERAL 2 VIEW INCL AP PELVIS: ICD-10-PCS | Mod: 26,,, | Performed by: RADIOLOGY

## 2023-07-28 PROCEDURE — 72100 X-RAY EXAM L-S SPINE 2/3 VWS: CPT | Mod: 26,,, | Performed by: RADIOLOGY

## 2023-07-28 NOTE — PROGRESS NOTES
Subjective:       Patient ID: Tamica Cardona is a 67 y.o. female.    Chief Complaint: Back Pain    Back Pain  Pertinent negatives include no abdominal pain, chest pain, fever or headaches.     Here with concerns for back pain. She is have recurring lower back pain, closer to my hips than my middle back.  I have had this for a few years but over the last three months it has gotten worse.  Its mostly when I try to get up from a sitting position.some days feels like muscle, some days like hip area. States has been more active, playing with grand kids. Tylenol does not help. Heating pad helps but only while it is on her back. No sciatica or lower extremity weakness. Last xrays done in 2019 along with physical therapy. She would like repeat xrays and follow up. She states symptoms are just getting worse. See ROS.    The following portion of the patients history was reviewed and updated as appropriate: allergies, current medications, past medical and surgical history. Past social history and problem list reviewed. Family PMH and Past social history reviewed. Tobacco, Illicit drug use reviewed.      Review of patient's allergies indicates:   Allergen Reactions    Thimerosal Swelling    Erythromycin base      Other reaction(s): Vomiting  Other reaction(s): Stomach upset  Other reaction(s): Nausea    Penicillins      Other reaction(s): convulsions         Current Outpatient Medications:     acetaminophen (TYLENOL) 500 MG tablet, Take 750 mg by mouth every 6 (six) hours as needed for Pain., Disp: , Rfl:     ascorbic acid, vitamin C, (VITAMIN C) 500 MG tablet, Take 500 mg by mouth once daily., Disp: , Rfl:     calcium carbonate (OS-DESTINY) 600 mg (1,500 mg) Tab, Take 600 mg by mouth 2 (two) times daily with meals., Disp: , Rfl:     EScitalopram oxalate (LEXAPRO) 10 MG tablet, Take 1.5 tablets (15 mg total) by mouth once daily., Disp: 180 tablet, Rfl: 2    ferrous gluconate (FERGON) 325 MG tablet, Take 45 mg by mouth daily  with breakfast., Disp: , Rfl:     flecainide (TAMBOCOR) 100 MG Tab, Take 1 tablet (100 mg total) by mouth every 12 (twelve) hours as needed., Disp: 60 tablet, Rfl: 11    fluticasone propionate (FLONASE) 50 mcg/actuation nasal spray, , Disp: , Rfl:     loratadine (CLARITIN) 10 mg tablet, Take 10 mg by mouth daily as needed. , Disp: , Rfl:     meclizine (ANTIVERT) 25 mg tablet, Take 1 tablet (25 mg total) by mouth 3 (three) times daily as needed., Disp: 30 tablet, Rfl: 1    omega 3-dha-epa-fish oil 500-100-1,000 mg Cap, Take 1 capsule by mouth once daily. , Disp: , Rfl:     rivaroxaban (XARELTO) 20 mg Tab, Take 1 tablet (20 mg total) by mouth before dinner., Disp: 30 tablet, Rfl: 11    rosuvastatin (CRESTOR) 10 MG tablet, Take 1 tablet (10 mg total) by mouth once daily., Disp: 90 tablet, Rfl: 3    vitamin D (VITAMIN D3) 1000 units Tab, Take 1,000 Units by mouth once daily., Disp: , Rfl:     metoprolol succinate (TOPROL-XL) 25 MG 24 hr tablet, Take 0.5 tablets (12.5 mg total) by mouth once daily., Disp: 90 tablet, Rfl: 3    Past Medical History:   Diagnosis Date    Allergy     Anxiety     Atrial fibrillation 10/12/2020    Breast cancer 11/2003    right    Depression     General anesthetics causing adverse effect in therapeutic use     slow to wake-- easily sedated    History of UTI     Interstitial cystitis 2003    Neutropenia        Past Surgical History:   Procedure Laterality Date    BREAST BIOPSY Left 2006    benign    CYSTOSCOPY  2003    EYE SURGERY  01/2015    HYSTERECTOMY      TVH, ovaries intact. Uterine prolapse    INSERTION OF IMPLANTABLE LOOP RECORDER Left 02/08/2021    Procedure: Insertion, Implantable Loop Recorder;  Surgeon: Collin Roberts MD;  Location: [facility];  Service: Cardiology;  Laterality: Left;    KNEE ARTHROSCOPY W/ MENISCECTOMY  1992    LT    left breast biopsy  2006    benign    MASTECTOMY, RADICAL Right 12/2003    RETINAL DETACHMENT SURGERY Right        Social History     Socioeconomic  "History    Marital status:    Tobacco Use    Smoking status: Never    Smokeless tobacco: Never   Substance and Sexual Activity    Alcohol use: No    Drug use: No     Social Determinants of Health     Financial Resource Strain: Low Risk     Difficulty of Paying Living Expenses: Not hard at all   Food Insecurity: No Food Insecurity    Worried About Running Out of Food in the Last Year: Never true    Ran Out of Food in the Last Year: Never true   Transportation Needs: No Transportation Needs    Lack of Transportation (Medical): No    Lack of Transportation (Non-Medical): No   Physical Activity: Insufficiently Active    Days of Exercise per Week: 2 days    Minutes of Exercise per Session: 40 min   Stress: No Stress Concern Present    Feeling of Stress : Only a little   Social Connections: Unknown    Frequency of Communication with Friends and Family: More than three times a week    Frequency of Social Gatherings with Friends and Family: More than three times a week    Active Member of Clubs or Organizations: Yes    Attends Club or Organization Meetings: More than 4 times per year    Marital Status:    Housing Stability: Low Risk     Unable to Pay for Housing in the Last Year: No    Number of Places Lived in the Last Year: 1    Unstable Housing in the Last Year: No     Review of Systems   Constitutional:  Negative for fatigue and fever.   Respiratory:  Negative for cough, chest tightness, shortness of breath and wheezing.    Cardiovascular:  Negative for chest pain and palpitations.   Gastrointestinal:  Negative for abdominal pain, diarrhea, nausea and vomiting.   Genitourinary: Negative.    Musculoskeletal:  Positive for back pain. Negative for arthralgias and gait problem.   Neurological:  Negative for headaches.     Objective:      /66 (BP Location: Left arm, Patient Position: Sitting, BP Method: Medium (Manual))   Pulse 61   Temp 98.6 °F (37 °C)   Ht 5' 5" (1.651 m)   Wt 69 kg (152 lb 3.6 oz)  "  SpO2 97%   BMI 25.33 kg/m²      Physical Exam  Constitutional:       Appearance: Normal appearance.   HENT:      Head: Normocephalic.   Neck:      Thyroid: No thyromegaly.      Vascular: No carotid bruit.   Cardiovascular:      Rate and Rhythm: Normal rate and regular rhythm.      Pulses: Normal pulses.      Heart sounds: Normal heart sounds. No murmur heard.  Pulmonary:      Effort: Pulmonary effort is normal.      Breath sounds: Normal breath sounds. No wheezing.   Abdominal:      General: Bowel sounds are normal.      Tenderness: There is no abdominal tenderness. There is no right CVA tenderness or left CVA tenderness.   Musculoskeletal:      Cervical back: Normal range of motion.      Lumbar back: Spasms present. No tenderness. Negative right straight leg raise test and negative left straight leg raise test.      Right hip: No tenderness. Normal range of motion. Normal strength.      Left hip: No tenderness. Normal range of motion. Normal strength.      Right lower leg: No edema.      Left lower leg: No edema.      Comments: Gait normal.  strong, equal. She has pain with straightening up when rising from a sitting position   Skin:     General: Skin is warm and dry.      Capillary Refill: Capillary refill takes less than 2 seconds.   Neurological:      General: No focal deficit present.      Mental Status: She is alert.   Psychiatric:         Attention and Perception: Attention and perception normal.         Mood and Affect: Mood and affect normal.         Speech: Speech normal.         Behavior: Behavior normal.       Assessment:       1. Chronic midline low back pain without sciatica    2. Bilateral hip pain        Plan:       Chronic midline low back pain without sciatica: will schedule xrays and evaluation by back clinic. She does not want medications for her discomfort.   -     X-Ray Lumbar Spine AP And Lateral; Future; Expected date: 07/28/2023  -     Ambulatory referral/consult to Back & Spine  Clinic; Future; Expected date: 08/04/2023    Bilateral hip pain  -     X-Ray Hips Bilateral 2 View Incl AP Pelvis; Future; Expected date: 07/28/2023  -     Ambulatory referral/consult to Back & Spine Clinic; Future; Expected date: 08/04/2023    Continue stretching, moist heat. Hydrate well. Consider massage.     Continue current medication  Take medications only as prescribed  Healthy diet, exercise  Adequate rest  Adequate hydration  Avoid allergens  Avoid excessive caffeine      Follow up as scheduled.

## 2023-07-29 ENCOUNTER — PATIENT MESSAGE (OUTPATIENT)
Dept: FAMILY MEDICINE | Facility: CLINIC | Age: 68
End: 2023-07-29
Payer: MEDICARE

## 2023-07-31 ENCOUNTER — PATIENT MESSAGE (OUTPATIENT)
Dept: FAMILY MEDICINE | Facility: CLINIC | Age: 68
End: 2023-07-31
Payer: MEDICARE

## 2023-08-02 ENCOUNTER — PATIENT MESSAGE (OUTPATIENT)
Dept: FAMILY MEDICINE | Facility: CLINIC | Age: 68
End: 2023-08-02
Payer: MEDICARE

## 2023-08-03 ENCOUNTER — OFFICE VISIT (OUTPATIENT)
Dept: PAIN MEDICINE | Facility: CLINIC | Age: 68
End: 2023-08-03
Payer: MEDICARE

## 2023-08-03 VITALS
BODY MASS INDEX: 25.4 KG/M2 | HEART RATE: 67 BPM | SYSTOLIC BLOOD PRESSURE: 122 MMHG | DIASTOLIC BLOOD PRESSURE: 67 MMHG | HEIGHT: 65 IN | WEIGHT: 152.44 LBS

## 2023-08-03 DIAGNOSIS — M54.50 CHRONIC MIDLINE LOW BACK PAIN WITHOUT SCIATICA: Primary | ICD-10-CM

## 2023-08-03 DIAGNOSIS — G89.29 CHRONIC MIDLINE LOW BACK PAIN WITHOUT SCIATICA: Primary | ICD-10-CM

## 2023-08-03 DIAGNOSIS — M41.9 SCOLIOSIS OF THORACOLUMBAR SPINE, UNSPECIFIED SCOLIOSIS TYPE: ICD-10-CM

## 2023-08-03 DIAGNOSIS — M25.552 BILATERAL HIP PAIN: ICD-10-CM

## 2023-08-03 DIAGNOSIS — M25.551 BILATERAL HIP PAIN: ICD-10-CM

## 2023-08-03 PROCEDURE — 99999 PR PBB SHADOW E&M-EST. PATIENT-LVL IV: CPT | Mod: PBBFAC,,, | Performed by: PHYSICIAN ASSISTANT

## 2023-08-03 PROCEDURE — 99999 PR PBB SHADOW E&M-EST. PATIENT-LVL IV: ICD-10-PCS | Mod: PBBFAC,,, | Performed by: PHYSICIAN ASSISTANT

## 2023-08-03 PROCEDURE — 99213 OFFICE O/P EST LOW 20 MIN: CPT | Mod: S$PBB,,, | Performed by: PHYSICIAN ASSISTANT

## 2023-08-03 PROCEDURE — 99214 OFFICE O/P EST MOD 30 MIN: CPT | Mod: PBBFAC,PN | Performed by: PHYSICIAN ASSISTANT

## 2023-08-03 PROCEDURE — 99213 PR OFFICE/OUTPT VISIT, EST, LEVL III, 20-29 MIN: ICD-10-PCS | Mod: S$PBB,,, | Performed by: PHYSICIAN ASSISTANT

## 2023-08-03 NOTE — PROGRESS NOTES
[facility] New Patient Evaluation      Referred by: Tamica Kent    PCP:   Tamica Kent NP    CC:   Chief Complaint   Patient presents with    Low-back Pain    Hip Pain     Pain is worse on the right side.          HPI:   Tamica Cardona is a 67 y.o. year old female patient who has a past medical history of Allergy, Anxiety, Atrial fibrillation, Breast cancer, Depression, General anesthetics causing adverse effect in therapeutic use, History of UTI, Interstitial cystitis, and Neutropenia. She presents in referral from Tamica Kent for lower back pain.  She describe intermittent lower back pain occurring about once every 6-8 weeks and lasting about 3 weeks each time.  Pain is triggering by increased physical activity (mopping, gardening).  Iti s felt across the lower lumbar region from hip to hip and greatest on the right side.  She denies any radicular leg pain, numbness or tingling.  Pain typically improves with stretching over the course of three weeks.  Most recent exaerbation started 2-3 weeks ago; it has eased with no significant pain today.  She uses a heating pain and tylenol.  She recalls PT about 5 years ago and chiropractor about 10+ years ago.    Denies bowel/ bladder incontinence.    Past and current medications:  Antineuropathics:  NSAIDs:  Antidepressants:  Muscle relaxers:  Opioids:  Antiplatelets/Anticoagulants:  Others:  tylneol    Physical Therapy/ Chiropractic care:  PT - 5 years ago  Chiropractor - 10+ years ago; 2 visits    Pain Intervention History:  none    Past Spine Surgical History:  none        History:    Current Outpatient Medications:     acetaminophen (TYLENOL) 500 MG tablet, Take 750 mg by mouth every 6 (six) hours as needed for Pain., Disp: , Rfl:     ascorbic acid, vitamin C, (VITAMIN C) 500 MG tablet, Take 500 mg by mouth once daily., Disp: , Rfl:     calcium carbonate (OS-DESTINY) 600 mg (1,500 mg) Tab, Take 600 mg by mouth 2 (two)  times daily with meals., Disp: , Rfl:     EScitalopram oxalate (LEXAPRO) 10 MG tablet, Take 1.5 tablets (15 mg total) by mouth once daily., Disp: 180 tablet, Rfl: 2    ferrous gluconate (FERGON) 325 MG tablet, Take 45 mg by mouth daily with breakfast., Disp: , Rfl:     flecainide (TAMBOCOR) 100 MG Tab, Take 1 tablet (100 mg total) by mouth every 12 (twelve) hours as needed., Disp: 60 tablet, Rfl: 11    fluticasone propionate (FLONASE) 50 mcg/actuation nasal spray, , Disp: , Rfl:     loratadine (CLARITIN) 10 mg tablet, Take 10 mg by mouth daily as needed. , Disp: , Rfl:     meclizine (ANTIVERT) 25 mg tablet, Take 1 tablet (25 mg total) by mouth 3 (three) times daily as needed., Disp: 30 tablet, Rfl: 1    omega 3-dha-epa-fish oil 500-100-1,000 mg Cap, Take 1 capsule by mouth once daily. , Disp: , Rfl:     rivaroxaban (XARELTO) 20 mg Tab, Take 1 tablet (20 mg total) by mouth before dinner., Disp: 30 tablet, Rfl: 11    rosuvastatin (CRESTOR) 10 MG tablet, Take 1 tablet (10 mg total) by mouth once daily., Disp: 90 tablet, Rfl: 3    vitamin D (VITAMIN D3) 1000 units Tab, Take 1,000 Units by mouth once daily., Disp: , Rfl:     metoprolol succinate (TOPROL-XL) 25 MG 24 hr tablet, Take 0.5 tablets (12.5 mg total) by mouth once daily., Disp: 90 tablet, Rfl: 3    Past Medical History:   Diagnosis Date    Allergy     Anxiety     Atrial fibrillation 10/12/2020    Breast cancer 11/2003    right    Depression     General anesthetics causing adverse effect in therapeutic use     slow to wake-- easily sedated    History of UTI     Interstitial cystitis 2003    Neutropenia        Past Surgical History:   Procedure Laterality Date    BREAST BIOPSY Left 2006    benign    CYSTOSCOPY  2003    EYE SURGERY  01/2015    HYSTERECTOMY      TVH, ovaries intact. Uterine prolapse    INSERTION OF IMPLANTABLE LOOP RECORDER Left 02/08/2021    Procedure: Insertion, Implantable Loop Recorder;  Surgeon: Collin Roberts MD;  Location: [facility];   Service: Cardiology;  Laterality: Left;    KNEE ARTHROSCOPY W/ MENISCECTOMY  1992    LT    left breast biopsy  2006    benign    MASTECTOMY, RADICAL Right 12/2003    RETINAL DETACHMENT SURGERY Right        Family History   Problem Relation Age of Onset    Cancer Father         bladder, prostate, lung cancer    Glaucoma Father     Cataracts Father     Macular degeneration Father     No Known Problems Mother     Cataracts Brother     Retinal detachment Brother     Glaucoma Brother     No Known Problems Sister     No Known Problems Maternal Aunt     No Known Problems Maternal Uncle     No Known Problems Paternal Aunt     No Known Problems Paternal Uncle     No Known Problems Maternal Grandmother     No Known Problems Maternal Grandfather     No Known Problems Paternal Grandmother     No Known Problems Paternal Grandfather     Urolithiasis Neg Hx     Kidney cancer Neg Hx     Amblyopia Neg Hx     Blindness Neg Hx     Diabetes Neg Hx     Hypertension Neg Hx     Strabismus Neg Hx     Stroke Neg Hx     Thyroid disease Neg Hx     Melanoma Neg Hx        Social History     Socioeconomic History    Marital status:    Tobacco Use    Smoking status: Never    Smokeless tobacco: Never   Substance and Sexual Activity    Alcohol use: No    Drug use: No     Social Determinants of Health     Financial Resource Strain: Low Risk  (7/26/2023)    Overall Financial Resource Strain (CARDIA)     Difficulty of Paying Living Expenses: Not hard at all   Food Insecurity: No Food Insecurity (7/26/2023)    Hunger Vital Sign     Worried About Running Out of Food in the Last Year: Never true     Ran Out of Food in the Last Year: Never true   Transportation Needs: No Transportation Needs (7/26/2023)    PRAPARE - Transportation     Lack of Transportation (Medical): No     Lack of Transportation (Non-Medical): No   Physical Activity: Insufficiently Active (7/26/2023)    Exercise Vital Sign     Days of Exercise per Week: 2 days     Minutes of  "Exercise per Session: 40 min   Stress: No Stress Concern Present (7/26/2023)    [employer] - Occupational Stress Questionnaire     Feeling of Stress : Only a little   Social Connections: Unknown (7/26/2023)    Social Connection and Isolation Panel [NHANES]     Frequency of Communication with Friends and Family: More than three times a week     Frequency of Social Gatherings with Friends and Family: More than three times a week     Active Member of Clubs or Organizations: Yes     Attends Club or Organization Meetings: More than 4 times per year     Marital Status:    Housing Stability: Low Risk  (7/26/2023)    Housing Stability Vital Sign     Unable to Pay for Housing in the Last Year: No     Number of Places Lived in the Last Year: 1     Unstable Housing in the Last Year: No       Review of patient's allergies indicates:   Allergen Reactions    Thimerosal Swelling    Erythromycin base      Other reaction(s): Vomiting  Other reaction(s): Stomach upset  Other reaction(s): Nausea    Penicillins      Other reaction(s): convulsions       Labs:  No results found for: "LABA1C", "HGBA1C"    Lab Results   Component Value Date    WBC 3.82 (L) 10/10/2022    HGB 13.9 10/10/2022    HCT 43.5 10/10/2022    MCV 95 10/10/2022     10/10/2022           Review of Systems:  Back pain.  Balance of review of systems is negative.    Physical Exam:  Vitals:    08/03/23 0956   BP: 122/67   Pulse: 67   Weight: 69.2 kg (152 lb 7.2 oz)   Height: 5' 5" (1.651 m)   PainSc:   2   PainLoc: Back     Body mass index is 25.37 kg/m².    Gen: NAD  Psych: mood appropriate for given condition  HEENT: eyes anicteric   CV: RRR, 2+ radial pulse  HEENT: anicteric   Respiratory: non-labored, no signs of respiratory distress  Abd: non-distended  Skin: warm, dry and intact.  Gait: Able to heel walk, toe walk. No antalgic gait.     Coordination:   Tandem walking coordination: normal    Cervical spine: ROM is full in " flexion, extension and lateral rotation without increased pain.  Spurling's maneuver causes no neck pain to either side.  Myofascial exam: No Tenderness to palpation across cervical paraspinous region bilaterally.    Lumbar spine:  Lumbar spine: ROM is full with flexion extension and oblique extension with no increased pain.    Chin's test causes no increased pain on either side.    Supine straight leg raise is negative bilaterally.    Internal and external rotation of the hip causes no increased pain on either side.  Myofascial exam: No tenderness to palpation across lumbar paraspinous muscles. No tenderness to palpation over the bilateral greater trochanters and bilateral SI joint    Sensory:  Intact and symmetrical to light touch in C4-T1 dermatomes bilaterally. Intact and symmetrical to light touch in L1-S1 dermatomes bilaterally.    Motor:    Right Left   C4 Shoulder Abduction  5  5   C5 Elbow Flexion    5  5   C6 Wrist Extension  5  5   C7 Elbow Extension   5  5   C8/T1 Hand Intrinsics   5  5        Right Left   L2/3 Iliacus Hip flexion  5  5   L3/4 Qudratus Femoris Knee Extension  5  5   L4/5 Tib Anterior Ankle Dorsiflexion   5  5   L5/S1 Extensor Hallicus Longus Great toe extension  5  5   S1/S2 Gastroc/Soleus Plantar Flexion  5  5      Right Left   Triceps DTR 2+ 2+   Biceps DTR 2+ 2+   Brachioradialis DTR 2+ 2+   Patellar DTR 2+ 2+   Achilles DTR 2+ 2+   Hall Absent  Absent   Clonus Absent Absent   Babinski Absent Absent       Imaging:  Xray lumbar spine 7/28/23:     Rotatory dextroscoliosis and mild multilevel degenerative change of the lumbar spine.  No acute radiographic abnormality.         Assessment:   Tamica Cardona is a 67 y.o. year old female patient who has a past medical history of Allergy, Anxiety, Atrial fibrillation, Breast cancer, Depression, General anesthetics causing adverse effect in therapeutic use, History of UTI, Interstitial cystitis, and Neutropenia. She presents in  referral from Tamica Kent for chronic intermittent lower lumbar back pain without radiculopathy nor neurological deficits.  Pain pattern consistent with myofascial mechanical pain.  Cannot rule out influence of mild degenerative changes contributing to pain.     Problem List Items Addressed This Visit       Chronic low back pain - Primary    Relevant Orders    Ambulatory referral/consult to [facility]     Other Visit Diagnoses       Bilateral hip pain        Scoliosis of thoracolumbar spine, unspecified scoliosis type        Relevant Orders    Ambulatory referral/consult to [facility]            Plan:   - recommend healthy back PT  - if no improvement consider MRI to further evaluate degenerative chagnes contributing to pain and if she would be a candidate for interventional procedures.  - follow up if no improvement with PT.      Follow Up: RTC as needed    : Not applicable    Thank you for referring this interesting patient, and I look forward to continuing to collaborate in her care.        Leeanna Desai PA-C  [facility]

## 2023-08-08 DIAGNOSIS — I48.0 PAROXYSMAL ATRIAL FIBRILLATION: ICD-10-CM

## 2023-08-08 RX ORDER — METOPROLOL SUCCINATE 25 MG/1
25 TABLET, EXTENDED RELEASE ORAL
Qty: 90 TABLET | Refills: 1 | Status: SHIPPED | OUTPATIENT
Start: 2023-08-08 | End: 2024-02-05

## 2023-08-11 ENCOUNTER — CLINICAL SUPPORT (OUTPATIENT)
Dept: REHABILITATION | Facility: HOSPITAL | Age: 68
End: 2023-08-11
Payer: MEDICARE

## 2023-08-11 DIAGNOSIS — G89.29 CHRONIC MIDLINE LOW BACK PAIN WITHOUT SCIATICA: ICD-10-CM

## 2023-08-11 DIAGNOSIS — M54.50 CHRONIC MIDLINE LOW BACK PAIN WITHOUT SCIATICA: ICD-10-CM

## 2023-08-11 DIAGNOSIS — M25.69 DECREASED ROM OF TRUNK AND BACK: ICD-10-CM

## 2023-08-11 DIAGNOSIS — M41.9 SCOLIOSIS OF THORACOLUMBAR SPINE, UNSPECIFIED SCOLIOSIS TYPE: ICD-10-CM

## 2023-08-11 PROCEDURE — 97110 THERAPEUTIC EXERCISES: CPT | Mod: PO | Performed by: PHYSICAL THERAPIST

## 2023-08-11 PROCEDURE — 97530 THERAPEUTIC ACTIVITIES: CPT | Mod: PO | Performed by: PHYSICAL THERAPIST

## 2023-08-11 PROCEDURE — 97112 NEUROMUSCULAR REEDUCATION: CPT | Mod: PO | Performed by: PHYSICAL THERAPIST

## 2023-08-11 PROCEDURE — 97162 PT EVAL MOD COMPLEX 30 MIN: CPT | Mod: PO | Performed by: PHYSICAL THERAPIST

## 2023-08-11 NOTE — PLAN OF CARE
[facility]  Physical Therapy Lumbar Evaluation      Name: Tamica Cardona  Clinic Number: [id number]    Therapy Diagnosis:   Encounter Diagnoses   Name Primary?    Chronic midline low back pain without sciatica     Scoliosis of thoracolumbar spine, unspecified scoliosis type     Decreased ROM of trunk and back      Physician: Leeanna Desai PA-C    Physician Orders: PT Eval and Treat- Healthy Back  Medical Diagnosis from Referral: Chronic midline low back pain without sciatica, Scoliosis of thoracolumbar spine  Evaluation Date: 8/11/2023  Authorization Period Expiration: 8/2/24  Plan of Care Expiration: 10/20/2023  Reassessment Due: 9/11/2023  Visit # / Visits authorized: 1/1    Time In: 8:30AM  Time Out: 10:00AM  Total Billable Time: 90 minutes  INSURANCE and OUTCOMES: Fee for Service with FOTO Outcomes 1/3    Precautions: standard    Pattern of pain determined: Pattern 1 PEP    Subjective     Date of onset: recent flare-up 4 weeks ago, chronic history  History of current condition: Tamica reports she has had chronic recurring episodes of low back pain for the last 5 years. Episodes occur every 6-8 weeks and last about 3 weeks before resolving. She relates flare-up of pain to activities requiring forward bending such as sweeping, mopping, pulling weeds in garden, lifting or bending over grandchildren. She also has pain and difficulty rising from sitting or getting out of bed. She reports midline low back pain which can go across back and seems to last longer on right side.     Medical History:   Past Medical History:   Diagnosis Date    Allergy     Anxiety     Atrial fibrillation 10/12/2020    Breast cancer 11/2003    right    Depression     General anesthetics causing adverse effect in therapeutic use     slow to wake-- easily sedated    History of UTI     Interstitial cystitis 2003    Neutropenia        Surgical History:   Tamica Cardona  has a past surgical history  that includes Knee arthroscopy w/ meniscectomy (1992); Cystoscopy (2003); Eye surgery (01/2015); Hysterectomy; left breast biopsy (2006); Mastectomy, radical (Right, 12/2003); Breast biopsy (Left, 2006); Retinal detachment surgery (Right); and Insertion of implantable loop recorder (Left, 02/08/2021).    Medications:   Tamica has a current medication list which includes the following prescription(s): acetaminophen, ascorbic acid (vitamin c), calcium carbonate, escitalopram oxalate, ferrous gluconate, flecainide, fluticasone propionate, loratadine, meclizine, metoprolol succinate, omega 3-dha-epa-fish oil, rivaroxaban, rosuvastatin, and vitamin d.    Allergies:   Review of patient's allergies indicates:   Allergen Reactions    Thimerosal Swelling    Erythromycin base      Other reaction(s): Vomiting  Other reaction(s): Stomach upset  Other reaction(s): Nausea    Penicillins      Other reaction(s): convulsions        Imaging: Xray 7/28/23:  Rotatory dextroscoliosis and mild multilevel degenerative change of the lumbar spine.  No acute radiographic abnormality.         Prior Therapy: about 5 years ago  Prior Treatment: chiropractor  Social History:   lives with their spouse  Occupation: retired secretarial/accounting work  Leisure: walk, dance, play with grandchildren, travel, reading, gardening      Prior Level of Function: no difficulty with housework, gardening, bending or lifting  Current Level of Function: difficulty with housework, gardening, bending and lifting  DME owned/used: no  Gym Membership: no    Pain:  Current 2/10, worst 9/10, best 0-1/10   Location: bilateral back , worse on right  Description: Burning and stabbing when at worst, aching at best  Aggravating Factors: Bending, Lifting, and Getting out of bed/chair  Easing Factors: lying down, heating pad, and rest  Disturbed Sleep: no    Pattern of pain questions:  1.  Where is your pain the worst? Across low back  2.  Is your pain constant or  intermittent? Intermittent, but recurring  3.  Does bending forward make your typical pain worse? yes  4.  Since the start of your back pain, has there been a change in your bowel or bladder? no  5.  What can't you do now that you use to be able to do? Pain stops her from activity when in a flare-up, limit gardening, limit activity with grandchildren    Pts goals: prevent episodes of pain, reduce pain    Red Flag Screening:   Cough/Sneeze Strain: (+) if in a flare-up  Bladder/Bowel: (--)  Falls: (+) tripped in garage and hit face on garage door 3 weeks ago  Night pain: (--)  Unexplained weight loss: (--)  General health: A-fib, depression/anxiety    Objective      Postural examination/scapula alignment: Rounded shoulder, Head forward, and slight forward flexion of hips and trunk. She has scoliosis and is in right shifted position.  Joint integrity: WNL  Skin integrity:WNL   Edema: None  Correction of posture: better with lumbar roll  Sitting: flexed  Standing: as above    MOVEMENT LOSS - Lumbar   Norms ROM Loss Initial   Flexion Fingers touch toes, sacral angle >/= 70 deg, uniform spinal curvature, posterior weight shift  major loss   Extension ASIS surpasses toes, spine of scapulae surpasses heels, uniform spinal curve minimal loss   Side glide Right  within functional limits   Side glide Left  moderate loss   Rotation Right PT observes contralateral shoulder minimal loss   Rotation Left PT observes contralateral shoulder minimal loss     Lower Extremity Strength  Right LE  Left LE    Hip flexion: 4/5 Hip flexion: 4/5   Hip extension:  5/5 Hip extension: 5/5   Hip abduction: 5/5 Hip abduction: 5/5   Hip adduction:  5/5 Hip adduction:  5/5   Hip External Rotation 5/5 Hip External Rotation 5/5   Hip Internal rotation   5/5 Hip Internal rotation 5/5   Knee Flexion 5/5 Knee Flexion 5/5   Knee Extension 5/5 Knee Extension 5/5   Ankle dorsiflexion: 5/5 Ankle dorsiflexion: 5/5   Ankle plantarflexion: 5/5 Ankle  plantarflexion: 5/5     GAIT:  Assistive Device used: none  Level of Assistance: independent  Patient displays the following gait deviations: no gait deviations observed.     Special Tests:   Test Name  Test Result   Prone Instability Test (--)   SI Joint Provocation Test (--)   Straight Leg Raise (--)   Neural Tension Test (--)   Crossed Straight Leg Raise (--)   Walking on toes Able   Walking on heels  Able     NEUROLOGICAL SCREENING:     Sensory deficits: no    Reflexes:    Left Right   Patella Tendon 2+ 2+   Achilles Tendon 2+ 2+   Babinski  (--) (--)   Clonus (--) (--)     REPEATED TEST MOVEMENTS:    Baseline symptoms:  Repeated Flexion in Standing worse   Repeated Extension in Standing better   Repeated Flexion in lying worse   Repeated Extension in lying  better       STATIC TESTS and other movements:   Prone lie better   Prone lie on elbows better   Sitting slouched  worse   Sitting erect better   Standing slouched no effect   Standing erect  better       Baseline Isometric Testing on Med X equipment: Testing administered by PT    Date of testin23  ROM 0-36 deg   Max Peak Torque 100    Min Peak Torque 68    Flex/Ext Ratio 1.5:1   % below normative data 0           OUTCOMES SELECTION:   Intake Outcome Measure for FOTO lumbar Survey    Therapist reviewed FOTO scores for Tamica Cardona on 2023.   FOTO documents entered into [employer] - see Media section.    Intake Score: 14% functional ability  Goal Score: 10% functional ability          Treatment     Total Treatment time separate from Evaluation: 65 minutes    Tamica received therapeutic exercises to develop/improve posture, lumbar ROM, strength, and muscular endurance for 15 minutes including the following exercises:     Prone on elbows 2 min  Press ups 2/10  Bridging 2/10  Standing extension x10    Written Home Exercises Provided: yes.    HEP AS FOLLOWS:  Prone on elbows  Press ups  Bridging  Standing extension    Exercises were reviewed and  Tamica was able to demonstrate them prior to the end of the session. Tamica demonstrated good  understanding of the education provided.     See EMR under Patient Instructions for exercises provided 8/11/2023.    Tamica received neuromuscular education to engage spinal musculature correctly for motor control and engagement of musculature for 15 minutes including the MedX exercise component and practice and standard testing. MedX dynamic exercise and baseline isometric test performed with instructions to guide the patient safely through the testing procedure. Patient instructed to perform isometric test correctly and safely while building to an optimal force with a pain-free effort. Patient also instructed that she should feel support/pressure from MedX restraints but no pain/discomfort. Patient demonstrated appropriate understanding of information.     [facility] 8/11/2023   Visit Number 1   VAS Pain Rating 2   Extension in Lying 20   Extension in Standing 10   Lumbar Extension Seat Pad 1   Femur Restraint 5   Top Dead Center 24   Counterweight 192   Lumbar Flexion 36   Lumbar Extension 0   Lumbar Peak Torque 100   Min Torque 68   Test Percent Below Normative Data 0   Ice - Z Lie (in min.) 10            Therapeutic Education/Activity provided for 25 minutes:   - Patient was given an [facility] Visit 1 handout which discusses the following:  - what to expect in therapy  - an overview of the program, including health coaching and wellness  - importance of spinal hygiene, proper posture, lifting mechanics, sleep quality, and nutrition/hydration   - Laura roll trialed, recommended, and purchase information was provided.  - Patient received a handout regarding anticipated muscular soreness following the isometric test and strategies for management were reviewed with patient including stretching, using ice and scheduled rest.   - Patient received verbal education on the following:   - Healthy Back  program   - purpose of the isometric test  - safe progression of lumbar strengthening, wellness approach, and systemic strengthening.   - safe usage of MedX machine and testing protocols.    Tamica received cold pack for 10 minutes to low back in Z-lie.    Assessment     Tamica is a 67 y.o. female referred to [facility] with a medical diagnosis of Chronic midline low back pain without sciatica, Scoliosis of thoracolumbar spine. Pt presents with acute episode of recurring, chronic back pain. She is in slight right shifted position and is limited and painful with left shift. She improves significantly with extension preference. She has been untrained in importance of proper posture and body mechanics. She is significantly limited with forward flexion at this time. An active physical therapy program is recommended with the goal to restore function and reduce pain. A program of progressive resisted exercise, stretching, instruction in proper body mechanics, aerobic conditioning and a HEP will be included      Pain Pattern: Pattern 1 PEP       Pt prognosis is Excellent.     Pt will benefit from skilled outpatient Physical Therapy to address the deficits stated above and in the chart below, to provide pt/family education, and to maximize pt's level of independence. Based on the above history and physical examination an active physical therapy program is recommended.      Plan of care discussed with patient: Yes  Pt's spiritual, cultural and educational needs considered and patient is agreeable to the plan of care and goals as stated below:     Anticipated Barriers for therapy: none    PT Evaluation Completed? Yes    Medical necessity is demonstrated by the following problem list:    History  Co-morbidities and personal factors that may impact the plan of care [] LOW: no personal factors / co-morbidities  [x] MODERATE: 1-2 personal factors / co-morbidities  [] HIGH: 3+ personal factors /  co-morbidities    Moderate / High Support Documentation:   Co-morbidities affecting plan of care: history of cancer, A-fib, depression, anxiety    Personal Factors:   no deficits     Examination  Body Structures and Functions, activity limitations and participation restrictions that may impact the plan of care [] LOW: addressing 1-2 elements  [x] MODERATE: 3+ elements  [] HIGH: 4+ elements (please support below)    Moderate / High Support Documentation: see objective eval findings     Clinical Presentation [] LOW: stable  [x] MODERATE: Evolving  [] HIGH: Unstable     Decision Making/ Complexity Score: moderate         GOALS: Pt is in agreement with the following goals.    Short term goals:  6 weeks or 10 visits   - Pt will demonstrate increased lumbar ROM by at least 3 degrees from the initial ROM value with improvements noted in functional ROM and ability to perform ADLs. Appropriate and Ongoing  - Pt will demonstrate increased MedX average isometric strength value by 20% from initial test resulting in improved ability to perform bending, lifting, and carrying activities safely, confidently. Appropriate and Ongoing  - Pt will report a reduction in worst pain score by 1-2 points for improved tolerance for sitting, bending, lifting. Appropriate and Ongoing  - Pt able to perform HEP correctly with minimal cueing or supervision from therapist to encourage independent management of symptoms. Appropriate and Ongoing    Long term goals: 10 weeks or 20 visits   - Pt will demonstrate increased lumbar ROM by at least 6 degrees from initial ROM value, resulting in improved ability to perform functional forward bending while standing and sitting. Appropriate and Ongoing  - Pt will demonstrate increased MedX average isometric strength value by 30% from initial test resulting in improved ability to perform bending, lifting, and carrying activities safely and confidently. Appropriate and Ongoing  - Pt to demonstrate ability to  independently control and reduce their pain through posture positioning and mechanical movements throughout a typical day. Appropriate and Ongoing  - Pt will demonstrate reduced pain and improved functional outcomes as reported on the FOTO by reaching an intake score of >/= 10% functional ability in order to demonstrate subjective improvement in patient's condition. . Appropriate and Ongoing  - Pt will demonstrate independence with the HEP at discharge. Appropriate and Ongoing  - Pt will be able to reduce pain and prevent recurring episodes of pain(patient goal) Appropriate and Ongoing    Plan     Outpatient physical therapy 2x week for 10 weeks or 20 visits to include the following:   - Patient education  - Therapeutic exercise  - Manual therapy  - Performance testing   - Neuromuscular Re-education  - Therapeutic activity   - Modalities    Pt may be seen by PTA as part of the rehabilitation team.     Therapist: Rema Pierre, PT  8/11/2023

## 2023-08-14 ENCOUNTER — CLINICAL SUPPORT (OUTPATIENT)
Dept: REHABILITATION | Facility: HOSPITAL | Age: 68
End: 2023-08-14
Payer: MEDICARE

## 2023-08-14 DIAGNOSIS — M25.69 DECREASED ROM OF TRUNK AND BACK: ICD-10-CM

## 2023-08-14 DIAGNOSIS — G89.29 CHRONIC MIDLINE LOW BACK PAIN WITHOUT SCIATICA: Primary | ICD-10-CM

## 2023-08-14 DIAGNOSIS — M54.50 CHRONIC MIDLINE LOW BACK PAIN WITHOUT SCIATICA: Primary | ICD-10-CM

## 2023-08-14 PROCEDURE — 97110 THERAPEUTIC EXERCISES: CPT | Mod: PO | Performed by: PHYSICAL THERAPIST

## 2023-08-14 PROCEDURE — 97112 NEUROMUSCULAR REEDUCATION: CPT | Mod: PO | Performed by: PHYSICAL THERAPIST

## 2023-08-14 NOTE — PROGRESS NOTES
[facility]  Physical Therapy Treatment Note     Name: Tamica Cardona  Clinic Number: [id number]    Therapy Diagnosis:   Encounter Diagnoses   Name Primary?    Chronic midline low back pain without sciatica Yes    Decreased ROM of trunk and back      Physician: Leeanna Desai PA-C    Visit Date: 2023    Physician Orders: PT Eval and Treat- Healthy Back  Medical Diagnosis from Referral: Chronic midline low back pain without sciatica, Scoliosis of thoracolumbar spine  Evaluation Date: 2023  Authorization Period Expiration: 24  Plan of Care Expiration: 10/20/2023  Reassessment Due: 2023  Visit # / Visits authorized:  (+1 prior auth)    PTA Visit #: 0     Time In: 2:30PM  Time Out: 3:10PM  Total Billable Time: 40 minutes  INSURANCE and OUTCOMES: Fee for Service with FOTO Outcomes 1/3    Precautions: standard    Pattern of pain determined: Pattern 1 PEP     Subjective     Tamica Cardona reports no change so far, but does notice decreased pain when sit erect and when extend backwards..      Patient reports tolerating previous visit with increased soreness.  Patient reports their pain to be 1-2/10 on a 0-10 scale with 0 being no pain and 10 being the worst pain imaginable.  Pain Location: bilateral back , worse on right      Work and leisure: Occupation: retired secretarial/accounting work  Leisure: walk, dance, play with grandchildren, travel, reading, gardening     Pt goals:  prevent episodes of pain, reduce pain     Objective      Baseline IM Testing Results:   Date of testin23  ROM 0-36 deg   Max Peak Torque 100    Min Peak Torque 68    Flex/Ext Ratio 1.5:1   % below normative data 0       Outcomes:  Limitation Score: 14%  Visit 5 Score:   Visit 10 Score:   Discharge Score:     Treatment     Tamica received the treatments listed below:      Tamica received neuromuscular education for 15 minutes via participation on the Medical MedX  Machine. Therapist assisted patient in isolating and engaging spinal stabilization musculature in order to improve functional ability and postural control. Patient performed exercise with therapist guidance in order to accurately use pacer function, avoid valsalva, and optimally exert effort within a safe and effective range via the Idris Exertion Rating Scale. Patient instructed to perform at a midrange of exertion and to complete 15-20 repetitions within appropriate split time, with proper technique, and while maintaining safety.     [facility] 8/14/2023   Visit Number 2   VAS Pain Rating 2   Treadmill Time (in min.) 10   Speed 2.2   Extension in Lying 20   Extension in Standing 10   Lumbar Extension Seat Pad -   Femur Restraint -   Top Dead Center -   Counterweight -   Lumbar Flexion -   Lumbar Extension -   Lumbar Peak Torque -   Min Torque -   Test Percent Below Normative Data -   Lumbar Weight 50   Repetitions 15   Rating of Perceived Exertion 3   Ice - Z Lie (in min.) -        Tamica participated in therapeutic exercises to develop strength, endurance, ROM, flexibility, and posture for 25 minutes including:       Prone on elbows 2 min  Press ups 2/10  Bridging 2/10  Standing extension x10    Peripheral muscle strengthening which included one set of 15-20 repetitions at a slow and controlled 10-13 second per rep pace focused on strengthening supporting musculature in order to improve body mechanics and functional mobility. Patient and therapist focused on proper form during treatment to ensure optimal strengthening of each targeted muscle group.  Machines utilized include torso rotation, leg extension, leg curl, chest press, upright row. Tricep extension, bicep curl, leg press, and hip abduction added visit 3    Tamica participated in dynamic functional therapeutic activities to improve functional performance and simulate household and community activities for 00  minutes. The following activities were  included:        Tamica received manual therapy techniques for 00  minutes. The following activities were included:    -    Pt given cold pack for 00 minutes to low back in Z-lie.    Patient Education and Home Exercises     Home exercises include:  Prone on elbows  Press ups  Bridging  Standing extension    Cardio program (V5): -  Lifting education (V11): -  Posture/Lumbar roll: instructed visit 1  Fr[employer] Discharge handout (date given): -  Equipment at home/gym membership:     Education provided:   - PT role and POC  - HEP  Sitting posture, daily stretching, use of lumbar roll, frequent extension throughout day    Written Home Exercises Provided: Patient instructed to cont prior HEP.  Exercises were reviewed and Tamica was able to demonstrate them prior to the end of the session.  Tamica demonstrated good  understanding of the education provided.     See EMR under Patient Instructions for exercises provided prior visit.    Assessment     Pt presents to second healthy back visit reporting soreness only, was able to demo HEP with Min VC for form. Pt was able to start neuro reeducation training, strengthening, and endurance training on the lumbar MedX at 50% of max peak torque according to the initial visit isometric test. Pt was able to complete 15 reps, with 3 RPE. Pt was also able to complete half of the peripheral strengthening exercises without increased discomfort and will complete the complete circuit next visit as tolerated.    Patient is making good progress towards established goals.  Pt will continue to benefit from skilled outpatient physical therapy to address the deficits stated in the impairment chart, provide pt/family education and to maximize pt's level of independence in the home and community environment.     Anticipated Barriers for therapy: none  Pt's spiritual, cultural and educational needs considered and pt agreeable to plan of care and goals as stated below:     Goals:      Short term  goals:  6 weeks or 10 visits   - Pt will demonstrate increased lumbar ROM by at least 3 degrees from the initial ROM value with improvements noted in functional ROM and ability to perform ADLs. Appropriate and Ongoing  - Pt will demonstrate increased MedX average isometric strength value by 20% from initial test resulting in improved ability to perform bending, lifting, and carrying activities safely, confidently. Appropriate and Ongoing  - Pt will report a reduction in worst pain score by 1-2 points for improved tolerance for sitting, bending, lifting. Appropriate and Ongoing  - Pt able to perform HEP correctly with minimal cueing or supervision from therapist to encourage independent management of symptoms. Appropriate and Ongoing     Long term goals: 10 weeks or 20 visits   - Pt will demonstrate increased lumbar ROM by at least 6 degrees from initial ROM value, resulting in improved ability to perform functional forward bending while standing and sitting. Appropriate and Ongoing  - Pt will demonstrate increased MedX average isometric strength value by 30% from initial test resulting in improved ability to perform bending, lifting, and carrying activities safely and confidently. Appropriate and Ongoing  - Pt to demonstrate ability to independently control and reduce their pain through posture positioning and mechanical movements throughout a typical day. Appropriate and Ongoing  - Pt will demonstrate reduced pain and improved functional outcomes as reported on the FOTO by reaching an intake score of >/= 10% functional ability in order to demonstrate subjective improvement in patient's condition. . Appropriate and Ongoing  - Pt will demonstrate independence with the HEP at discharge. Appropriate and Ongoing  - Pt will be able to reduce pain and prevent recurring episodes of pain(patient goal) Appropriate and Ongoing      Plan     Continue with established Plan of Care towards established PT goals.     Therapist:  Rema Pierre, PT  8/14/2023

## 2023-08-17 ENCOUNTER — PATIENT MESSAGE (OUTPATIENT)
Dept: CARDIOLOGY | Facility: CLINIC | Age: 68
End: 2023-08-17
Payer: MEDICARE

## 2023-08-17 ENCOUNTER — CLINICAL SUPPORT (OUTPATIENT)
Dept: REHABILITATION | Facility: HOSPITAL | Age: 68
End: 2023-08-17
Payer: MEDICARE

## 2023-08-17 DIAGNOSIS — M54.50 CHRONIC MIDLINE LOW BACK PAIN WITHOUT SCIATICA: Primary | ICD-10-CM

## 2023-08-17 DIAGNOSIS — I48.0 PAROXYSMAL ATRIAL FIBRILLATION: Primary | ICD-10-CM

## 2023-08-17 DIAGNOSIS — G89.29 CHRONIC MIDLINE LOW BACK PAIN WITHOUT SCIATICA: Primary | ICD-10-CM

## 2023-08-17 DIAGNOSIS — M25.69 DECREASED ROM OF TRUNK AND BACK: ICD-10-CM

## 2023-08-17 PROCEDURE — 97110 THERAPEUTIC EXERCISES: CPT | Mod: PO | Performed by: PHYSICAL THERAPIST

## 2023-08-17 PROCEDURE — 97112 NEUROMUSCULAR REEDUCATION: CPT | Mod: PO | Performed by: PHYSICAL THERAPIST

## 2023-08-17 NOTE — PROGRESS NOTES
[facility]  Physical Therapy Treatment Note     Name: Tamica Cardona  Clinic Number: [id number]    Therapy Diagnosis:   Encounter Diagnoses   Name Primary?    Chronic midline low back pain without sciatica Yes    Decreased ROM of trunk and back      Physician: Leeanna Desai PA-C    Visit Date: 2023    Physician Orders: PT Eval and Treat- Healthy Back  Medical Diagnosis from Referral: Chronic midline low back pain without sciatica, Scoliosis of thoracolumbar spine  Evaluation Date: 2023  Authorization Period Expiration: 24  Plan of Care Expiration: 10/20/2023  Reassessment Due: 2023  Visit # / Visits authorized:  (+1 prior auth)    PTA Visit #: 0/5     Time In: 11:30AM  Time Out: 12:20PM  Total Billable Time: 50 minutes  INSURANCE and OUTCOMES: Fee for Service with FOTO Outcomes 1/3    Precautions: standard    Pattern of pain determined: Pattern 1 PEP     Subjective     Tamica Cardona reports she is sore in back from doing exercise, but not painful. She has been more aware of posture.    Patient reports tolerating previous visit with increased soreness.  Patient reports their pain to be 2-3/10 on a 0-10 scale with 0 being no pain and 10 being the worst pain imaginable.  Pain Location: bilateral back , worse on right      Work and leisure: Occupation: retired secretarial/accounting work  Leisure: walk, dance, play with grandchildren, travel, reading, gardening     Pt goals:  prevent episodes of pain, reduce pain     Objective      Baseline IM Testing Results:   Date of testin23  ROM 0-36 deg   Max Peak Torque 100    Min Peak Torque 68    Flex/Ext Ratio 1.5:1   % below normative data 0       Outcomes:  Limitation Score: 14%  Visit 5 Score:   Visit 10 Score:   Discharge Score:     Treatment     Tamica received the treatments listed below:      Tamica received neuromuscular education for 15 minutes via participation on the Medical MedX  Machine. Therapist assisted patient in isolating and engaging spinal stabilization musculature in order to improve functional ability and postural control. Patient performed exercise with therapist guidance in order to accurately use pacer function, avoid valsalva, and optimally exert effort within a safe and effective range via the Idris Exertion Rating Scale. Patient instructed to perform at a midrange of exertion and to complete 15-20 repetitions within appropriate split time, with proper technique, and while maintaining safety.     [facility] 8/17/2023   Visit Number 3   VAS Pain Rating 2   Treadmill Time (in min.) 10   Speed 2.5   Extension in Lying 20   Extension in Standing 10   Lumbar Extension Seat Pad -   Femur Restraint -   Top Dead Center -   Counterweight -   Lumbar Flexion -   Lumbar Extension -   Lumbar Peak Torque -   Min Torque -   Test Percent Below Normative Data -   Lumbar Weight 50   Repetitions 20   Rating of Perceived Exertion 3   Ice - Z Lie (in min.) -        Tamica participated in therapeutic exercises to develop strength, endurance, ROM, flexibility, and posture for 35 minutes including:       Prone on elbows 2 min  Press ups 2/10  Bridging 2/10  Standing extension x10    Peripheral muscle strengthening which included one set of 15-20 repetitions at a slow and controlled 10-13 second per rep pace focused on strengthening supporting musculature in order to improve body mechanics and functional mobility. Patient and therapist focused on proper form during treatment to ensure optimal strengthening of each targeted muscle group.  Machines utilized include torso rotation, leg extension, leg curl, chest press, upright row. Tricep extension, bicep curl, leg press, and hip abduction added visit 3    Tamica participated in dynamic functional therapeutic activities to improve functional performance and simulate household and community activities for 00  minutes. The following activities were  included:        Tamica received manual therapy techniques for 00  minutes. The following activities were included:    -    Pt given cold pack for 00 minutes to low back in Z-lie.    Patient Education and Home Exercises     Home exercises include:  Prone on elbows  Press ups  Bridging  Standing extension    Cardio program (V5): -  Lifting education (V11): -  Posture/Lumbar roll: instructed visit 1  [employer] Discharge handout (date given): -  Equipment at home/gym membership:     Education provided:   - PT role and POC  - HEP  Sitting posture, daily stretching, use of lumbar roll, frequent extension throughout day    Written Home Exercises Provided: Patient instructed to cont prior HEP.  Exercises were reviewed and Tamica was able to demonstrate them prior to the end of the session.  Tamica demonstrated good  understanding of the education provided.     See EMR under Patient Instructions for exercises provided prior visit.    Assessment     Pt able to complete 20 reps on Sansan with 3RPE. Progressed core and LE exercises and initiated exercise on upper extremity peripheral machines, all with 2-3 PE levels. She should be able to progress with exercise and neuromuscular activities without difficulty. Cued patient for performance of extension and bridging exercises for correct form and better quality of movement.  Patient is making good progress towards established goals.  Pt will continue to benefit from skilled outpatient physical therapy to address the deficits stated in the impairment chart, provide pt/family education and to maximize pt's level of independence in the home and community environment.     Anticipated Barriers for therapy: none  Pt's spiritual, cultural and educational needs considered and pt agreeable to plan of care and goals as stated below:     Goals:      Short term goals:  6 weeks or 10 visits   - Pt will demonstrate increased lumbar ROM by at least 3 degrees from the initial ROM value  with improvements noted in functional ROM and ability to perform ADLs. Appropriate and Ongoing  - Pt will demonstrate increased MedX average isometric strength value by 20% from initial test resulting in improved ability to perform bending, lifting, and carrying activities safely, confidently. Appropriate and Ongoing  - Pt will report a reduction in worst pain score by 1-2 points for improved tolerance for sitting, bending, lifting. Appropriate and Ongoing  - Pt able to perform HEP correctly with minimal cueing or supervision from therapist to encourage independent management of symptoms. Appropriate and Ongoing     Long term goals: 10 weeks or 20 visits   - Pt will demonstrate increased lumbar ROM by at least 6 degrees from initial ROM value, resulting in improved ability to perform functional forward bending while standing and sitting. Appropriate and Ongoing  - Pt will demonstrate increased MedX average isometric strength value by 30% from initial test resulting in improved ability to perform bending, lifting, and carrying activities safely and confidently. Appropriate and Ongoing  - Pt to demonstrate ability to independently control and reduce their pain through posture positioning and mechanical movements throughout a typical day. Appropriate and Ongoing  - Pt will demonstrate reduced pain and improved functional outcomes as reported on the FOTO by reaching an intake score of >/= 10% functional ability in order to demonstrate subjective improvement in patient's condition. . Appropriate and Ongoing  - Pt will demonstrate independence with the HEP at discharge. Appropriate and Ongoing  - Pt will be able to reduce pain and prevent recurring episodes of pain(patient goal) Appropriate and Ongoing      Plan     Continue with established Plan of Care towards established PT goals.     Therapist: Rema Pierre, PT  8/17/2023

## 2023-08-21 ENCOUNTER — CLINICAL SUPPORT (OUTPATIENT)
Dept: REHABILITATION | Facility: HOSPITAL | Age: 68
End: 2023-08-21
Payer: MEDICARE

## 2023-08-21 DIAGNOSIS — M25.69 DECREASED ROM OF TRUNK AND BACK: ICD-10-CM

## 2023-08-21 DIAGNOSIS — M54.50 CHRONIC MIDLINE LOW BACK PAIN WITHOUT SCIATICA: Primary | ICD-10-CM

## 2023-08-21 DIAGNOSIS — G89.29 CHRONIC MIDLINE LOW BACK PAIN WITHOUT SCIATICA: Primary | ICD-10-CM

## 2023-08-21 PROCEDURE — 97112 NEUROMUSCULAR REEDUCATION: CPT | Mod: PO | Performed by: PHYSICAL THERAPIST

## 2023-08-21 PROCEDURE — 97110 THERAPEUTIC EXERCISES: CPT | Mod: PO | Performed by: PHYSICAL THERAPIST

## 2023-08-21 NOTE — PROGRESS NOTES
[facility]  Physical Therapy Treatment Note     Name: Tamica Cardona  Clinic Number: [id number]    Therapy Diagnosis:   Encounter Diagnoses   Name Primary?    Chronic midline low back pain without sciatica Yes    Decreased ROM of trunk and back      Physician: Leeanna Desai PA-C    Visit Date: 2023    Physician Orders: PT Eval and Treat- Healthy Back  Medical Diagnosis from Referral: Chronic midline low back pain without sciatica, Scoliosis of thoracolumbar spine  Evaluation Date: 2023  Authorization Period Expiration: 24  Plan of Care Expiration: 10/20/2023  Reassessment Due: 2023  Visit # / Visits authorized: 3/20 (+1 prior auth)    PTA Visit #: 0/5     Time In: 1:00PM  Time Out: 1:50PM  Total Billable Time: 50 minutes  INSURANCE and OUTCOMES: Fee for Service with FOTO Outcomes 1/3    Precautions: standard    Pattern of pain determined: Pattern 1 PEP     Subjective     Tamica Cardona reports she is sore in back after doing mopping and cleaning floors this morning. She did not think about doing her stretches.  Patient reports tolerating previous visit with increased soreness.  Patient reports their pain to be 2-3/10 on a 0-10 scale with 0 being no pain and 10 being the worst pain imaginable.  Pain Location: bilateral back , worse on right      Work and leisure: Occupation: retired secretarial/accounting work  Leisure: walk, dance, play with grandchildren, travel, reading, gardening     Pt goals:  prevent episodes of pain, reduce pain     Objective      Baseline IM Testing Results:   Date of testin23  ROM 0-36 deg   Max Peak Torque 100    Min Peak Torque 68    Flex/Ext Ratio 1.5:1   % below normative data 0       Outcomes:  Limitation Score: 14%  Visit 5 Score:   Visit 10 Score:   Discharge Score:     Treatment     Tamica received the treatments listed below:      Tamica received neuromuscular education for 15 minutes via participation on  the [employer] Machine. Therapist assisted patient in isolating and engaging spinal stabilization musculature in order to improve functional ability and postural control. Patient performed exercise with therapist guidance in order to accurately use pacer function, avoid valsalva, and optimally exert effort within a safe and effective range via the Idris Exertion Rating Scale. Patient instructed to perform at a midrange of exertion and to complete 15-20 repetitions within appropriate split time, with proper technique, and while maintaining safety.     [facility] 8/21/2023   Visit Number 4   VAS Pain Rating 2   Treadmill Time (in min.) 10   Speed 2.8   Extension in Lying 20   Extension in Standing 10   Lumbar Extension Seat Pad -   Femur Restraint -   Top Dead Center -   Counterweight -   Lumbar Flexion -   Lumbar Extension -   Lumbar Peak Torque -   Min Torque -   Test Percent Below Normative Data -   Lumbar Weight 53   Repetitions 20   Rating of Perceived Exertion 3   Ice - Z Lie (in min.) -            Tamica participated in therapeutic exercises to develop strength, endurance, ROM, flexibility, and posture for 35 minutes including:       Prone on elbows 2 min  Press ups 2/10  Bridging 2/10  Standing extension x10    Peripheral muscle strengthening which included one set of 15-20 repetitions at a slow and controlled 10-13 second per rep pace focused on strengthening supporting musculature in order to improve body mechanics and functional mobility. Patient and therapist focused on proper form during treatment to ensure optimal strengthening of each targeted muscle group.  Machines utilized include torso rotation, leg extension, leg curl, chest press, upright row. Tricep extension, bicep curl, leg press, and hip abduction added visit 3    Tamica participated in dynamic functional therapeutic activities to improve functional performance and simulate household and community activities for 00  minutes. The  following activities were included:        Tamica received manual therapy techniques for 00  minutes. The following activities were included:    -    Pt given cold pack for 00 minutes to low back in Z-lie.    Patient Education and Home Exercises     Home exercises include:  Prone on elbows  Press ups  Bridging  Standing extension    Cardio program (V5): -  Lifting education (V11): -  Posture/Lumbar roll: instructed visit 1  [employer] Discharge handout (date given): -  Equipment at home/gym membership:     Education provided:   - PT role and POC  - HEP  Sitting posture, daily stretching, use of lumbar roll, frequent extension throughout day    Written Home Exercises Provided: Patient instructed to cont prior HEP.  Exercises were reviewed and Tamica was able to demonstrate them prior to the end of the session.  Tamica demonstrated good  understanding of the education provided.     See EMR under Patient Instructions for exercises provided prior visit.    Assessment     Able to reduce pain with extension. Instructed in improved mechanics with bending and housework and interrupting activity with extension stretching. Decreased pain following session today. Increased resistance on MEDX, torso rotation and all peripheral machines without adverse effect and with appropriate perceived exertion.  Patient is making good progress towards established goals.  Pt will continue to benefit from skilled outpatient physical therapy to address the deficits stated in the impairment chart, provide pt/family education and to maximize pt's level of independence in the home and community environment.     Anticipated Barriers for therapy: none  Pt's spiritual, cultural and educational needs considered and pt agreeable to plan of care and goals as stated below:     Goals:      Short term goals:  6 weeks or 10 visits   - Pt will demonstrate increased lumbar ROM by at least 3 degrees from the initial ROM value with improvements noted in  functional ROM and ability to perform ADLs. Appropriate and Ongoing  - Pt will demonstrate increased MedX average isometric strength value by 20% from initial test resulting in improved ability to perform bending, lifting, and carrying activities safely, confidently. Appropriate and Ongoing  - Pt will report a reduction in worst pain score by 1-2 points for improved tolerance for sitting, bending, lifting. Appropriate and Ongoing  - Pt able to perform HEP correctly with minimal cueing or supervision from therapist to encourage independent management of symptoms. Appropriate and Ongoing     Long term goals: 10 weeks or 20 visits   - Pt will demonstrate increased lumbar ROM by at least 6 degrees from initial ROM value, resulting in improved ability to perform functional forward bending while standing and sitting. Appropriate and Ongoing  - Pt will demonstrate increased MedX average isometric strength value by 30% from initial test resulting in improved ability to perform bending, lifting, and carrying activities safely and confidently. Appropriate and Ongoing  - Pt to demonstrate ability to independently control and reduce their pain through posture positioning and mechanical movements throughout a typical day. Appropriate and Ongoing  - Pt will demonstrate reduced pain and improved functional outcomes as reported on the FOTO by reaching an intake score of >/= 10% functional ability in order to demonstrate subjective improvement in patient's condition. . Appropriate and Ongoing  - Pt will demonstrate independence with the HEP at discharge. Appropriate and Ongoing  - Pt will be able to reduce pain and prevent recurring episodes of pain(patient goal) Appropriate and Ongoing      Plan     Continue with established Plan of Care towards established PT goals.     Therapist: Rema Pierre, PT  8/21/2023

## 2023-08-26 ENCOUNTER — CLINICAL SUPPORT (OUTPATIENT)
Dept: CARDIOLOGY | Facility: HOSPITAL | Age: 68
End: 2023-08-26
Payer: MEDICARE

## 2023-08-26 DIAGNOSIS — Z95.818 PRESENCE OF OTHER CARDIAC IMPLANTS AND GRAFTS: ICD-10-CM

## 2023-08-29 ENCOUNTER — TELEPHONE (OUTPATIENT)
Dept: CARDIOLOGY | Facility: HOSPITAL | Age: 68
End: 2023-08-29
Payer: MEDICARE

## 2023-08-29 NOTE — TELEPHONE ENCOUNTER
AF w/ RVR noted during ILR device remote check:    Overall burden: 1.8% 8/11-8/26                 Max duration seen:2 hrs. 2 mins.                     Most recent episode: 8/24    Ventricular rates:  Needs improvement      Ventricular rate not in AF:    Meds:  Toprol XL 12.5 mg  Eliquis 5 mg   Flecainide 100 mg bid as needed      Anticoagulation status:  Eliquis    Patient symptoms: Pt. reports she felt palpitations and she took Flecainide. Pt. Reports she still happy with current therapy and says it  doesn't happen too often        Message sent to Dr. Roberts for review

## 2023-09-05 ENCOUNTER — CLINICAL SUPPORT (OUTPATIENT)
Dept: REHABILITATION | Facility: HOSPITAL | Age: 68
End: 2023-09-05
Payer: MEDICARE

## 2023-09-05 DIAGNOSIS — M54.50 CHRONIC MIDLINE LOW BACK PAIN WITHOUT SCIATICA: Primary | ICD-10-CM

## 2023-09-05 DIAGNOSIS — G89.29 CHRONIC MIDLINE LOW BACK PAIN WITHOUT SCIATICA: Primary | ICD-10-CM

## 2023-09-05 PROCEDURE — 97112 NEUROMUSCULAR REEDUCATION: CPT | Mod: PO,CQ

## 2023-09-05 PROCEDURE — 97110 THERAPEUTIC EXERCISES: CPT | Mod: PO,CQ

## 2023-09-05 NOTE — PROGRESS NOTES
[facility] OUTPATIENT THERAPY AND WELLNESS - HEALTHY BACK  Physical Therapy Treatment Note     Name: Tamica Cardona  Clinic Number: [id number]    Therapy Diagnosis:   Encounter Diagnosis   Name Primary?    Chronic midline low back pain without sciatica Yes       Physician: Leeanna Desai PA-C    Visit Date: 2023    Physician Orders: PT Eval and Treat- Healthy Back  Medical Diagnosis from Referral: Chronic midline low back pain without sciatica, Scoliosis of thoracolumbar spine  Evaluation Date: 2023  Authorization Period Expiration: 24  Plan of Care Expiration: 10/20/2023  Reassessment Due: 2023  Visit # / Visits authorized:  (+1 prior auth)    PTA Visit #:      Time In: 10:00PM  Time Out: 11:00PM  Total Billable Time: 50 minutes  INSURANCE and OUTCOMES: Fee for Service with FOTO Outcomes 1/3    Precautions: standard    Pattern of pain determined: Pattern 1 PEP     Subjective     Tamica Cardona reports getting LBP with leaning over.   Patient reports tolerating previous visit with increased soreness.  Patient reports their pain to be 1-2/10 on a 0-10 scale with 0 being no pain and 10 being the worst pain imaginable.  Pain Location: bilateral back , worse on right      Work and leisure: Occupation: retired secretarial/accounting work  Leisure: walk, dance, play with grandchildren, travel, reading, gardening     Pt goals:  prevent episodes of pain, reduce pain     Objective      Baseline IM Testing Results:   Date of testin23  ROM 0-36 deg   Max Peak Torque 100    Min Peak Torque 68    Flex/Ext Ratio 1.5:1   % below normative data 0       Outcomes:  Limitation Score: 14%  Visit 5 Score:   Visit 10 Score:   Discharge Score:     Treatment     Tamica received the treatments listed below:      Tamica received neuromuscular education for 15 minutes via participation on the [employer] Machine. Therapist assisted patient in isolating and engaging spinal stabilization musculature in order  to improve functional ability and postural control. Patient performed exercise with therapist guidance in order to accurately use pacer function, avoid valsalva, and optimally exert effort within a safe and effective range via the Idris Exertion Rating Scale. Patient instructed to perform at a midrange of exertion and to complete 15-20 repetitions within appropriate split time, with proper technique, and while maintaining safety.          Tamica participated in therapeutic exercises to develop strength, endurance, ROM, flexibility, and posture for 35 minutes including:       Prone on elbows 2 min  Press ups 2/10  Bridging 2/10  Standing extension x10      9/5/2023    10:33 AM   [facility]   Visit Number 5   VAS Pain Rating 2   Treadmill Time (in min.) 10 min   Speed 2.8 mph   Extension in Lying 20   Extension in Standing 10   Lumbar Weight 56 lbs   Repetitions 20   Rating of Perceived Exertion 3      Peripheral muscle strengthening which included one set of 15-20 repetitions at a slow and controlled 10-13 second per rep pace focused on strengthening supporting musculature in order to improve body mechanics and functional mobility. Patient and therapist focused on proper form during treatment to ensure optimal strengthening of each targeted muscle group.  Machines utilized include torso rotation, leg extension, leg curl, chest press, upright row. Tricep extension, bicep curl, leg press, and hip abduction added visit 3    Tamica participated in dynamic functional therapeutic activities to improve functional performance and simulate household and community activities for 00  minutes. The following activities were included:        Tamica received manual therapy techniques for 00  minutes. The following activities were included:    -    Pt given cold pack for 00 minutes to low back in Z-lie.    Patient Education and Home Exercises     Home exercises include:  Prone on elbows  Press ups  Bridging  Standing  extension    Cardio program (V5): -9/5/23  Lifting education (V11): -  Posture/Lumbar roll: instructed visit 1  [employer] Discharge handout (date given): -  Equipment at home/gym membership:     Education provided:   - PT role and POC  - HEP  Sitting posture, daily stretching, use of lumbar roll, frequent extension throughout day    Written Home Exercises Provided: Patient instructed to cont prior HEP.  Exercises were reviewed and Tamica was able to demonstrate them prior to the end of the session.  Tamica demonstrated good  understanding of the education provided.     See EMR under Patient Instructions for exercises provided prior visit.    Assessment     Able to reduce pain with extension. Educated her when sitting or leaning over and doing work around the house to perform EIS. Pt understood. Decreased pain following session today. Increased resistance on MEDX, torso rotation and all peripheral machines without adverse effect and with appropriate perceived exertion.  Patient is making good progress towards established goals.  Pt will continue to benefit from skilled outpatient physical therapy to address the deficits stated in the impairment chart, provide pt/family education and to maximize pt's level of independence in the home and community environment.     Anticipated Barriers for therapy: none  Pt's spiritual, cultural and educational needs considered and pt agreeable to plan of care and goals as stated below:     Goals:      Short term goals:  6 weeks or 10 visits   - Pt will demonstrate increased lumbar ROM by at least 3 degrees from the initial ROM value with improvements noted in functional ROM and ability to perform ADLs. Appropriate and Ongoing  - Pt will demonstrate increased MedX average isometric strength value by 20% from initial test resulting in improved ability to perform bending, lifting, and carrying activities safely, confidently. Appropriate and Ongoing  - Pt will report a reduction in  worst pain score by 1-2 points for improved tolerance for sitting, bending, lifting. Appropriate and Ongoing  - Pt able to perform HEP correctly with minimal cueing or supervision from therapist to encourage independent management of symptoms. Appropriate and Ongoing     Long term goals: 10 weeks or 20 visits   - Pt will demonstrate increased lumbar ROM by at least 6 degrees from initial ROM value, resulting in improved ability to perform functional forward bending while standing and sitting. Appropriate and Ongoing  - Pt will demonstrate increased MedX average isometric strength value by 30% from initial test resulting in improved ability to perform bending, lifting, and carrying activities safely and confidently. Appropriate and Ongoing  - Pt to demonstrate ability to independently control and reduce their pain through posture positioning and mechanical movements throughout a typical day. Appropriate and Ongoing  - Pt will demonstrate reduced pain and improved functional outcomes as reported on the FOTO by reaching an intake score of >/= 10% functional ability in order to demonstrate subjective improvement in patient's condition. . Appropriate and Ongoing  - Pt will demonstrate independence with the HEP at discharge. Appropriate and Ongoing  - Pt will be able to reduce pain and prevent recurring episodes of pain(patient goal) Appropriate and Ongoing      Plan     Continue with established Plan of Care towards established PT goals.     Therapist: Mercedes Mcdonough, PTA  9/5/2023

## 2023-09-07 ENCOUNTER — PATIENT MESSAGE (OUTPATIENT)
Dept: CARDIOLOGY | Facility: HOSPITAL | Age: 68
End: 2023-09-07
Payer: MEDICARE

## 2023-09-07 ENCOUNTER — CLINICAL SUPPORT (OUTPATIENT)
Dept: REHABILITATION | Facility: HOSPITAL | Age: 68
End: 2023-09-07
Payer: MEDICARE

## 2023-09-07 DIAGNOSIS — G89.29 CHRONIC MIDLINE LOW BACK PAIN WITHOUT SCIATICA: Primary | ICD-10-CM

## 2023-09-07 DIAGNOSIS — M54.50 CHRONIC MIDLINE LOW BACK PAIN WITHOUT SCIATICA: Primary | ICD-10-CM

## 2023-09-07 PROCEDURE — 97112 NEUROMUSCULAR REEDUCATION: CPT | Mod: PO,CQ

## 2023-09-07 PROCEDURE — 97110 THERAPEUTIC EXERCISES: CPT | Mod: PO,CQ

## 2023-09-07 NOTE — PROGRESS NOTES
[facility]  Physical Therapy Treatment Note     Name: Tamica Cardona  Clinic Number: [id number]    Therapy Diagnosis:   Encounter Diagnosis   Name Primary?    Chronic midline low back pain without sciatica Yes         Physician: Leeanna Desai PA-C    Visit Date: 2023    Physician Orders: PT Eval and Treat- Healthy Back  Medical Diagnosis from Referral: Chronic midline low back pain without sciatica, Scoliosis of thoracolumbar spine  Evaluation Date: 2023  Authorization Period Expiration: 24  Plan of Care Expiration: 10/20/2023  Reassessment Due: 2023  Visit # / Visits authorized:  (+1 prior auth)    PTA Visit #:      Time In: 10:00PM  Time Out: 11:50PM  Total Billable Time: 45 minutes  INSURANCE and OUTCOMES: Fee for Service with FOTO Outcomes 1/3    Precautions: standard    Pattern of pain determined: Pattern 1 PEP     Subjective     Tamica Cardona reports performing EIS more when sitting.  Patient reports tolerating previous visit with increased soreness.  Patient reports their pain to be 1-2/10 on a 0-10 scale with 0 being no pain and 10 being the worst pain imaginable.  Pain Location: bilateral back , worse on right      Work and leisure: Occupation: retired secretarial/accounting work  Leisure: walk, dance, play with grandchildren, travel, reading, gardening     Pt goals:  prevent episodes of pain, reduce pain     Objective      Baseline IM Testing Results:   Date of testin23  ROM 0-36 deg   Max Peak Torque 100    Min Peak Torque 68    Flex/Ext Ratio 1.5:1   % below normative data 0       Outcomes:  Limitation Score: 14%  Visit 5 Score:   Visit 10 Score:   Discharge Score:     Treatment     Tamica received the treatments listed below:      Tamica received neuromuscular education for 15 minutes via participation on the Home [employer] Machine. Therapist assisted patient in isolating and engaging spinal stabilization musculature in  order to improve functional ability and postural control. Patient performed exercise with therapist guidance in order to accurately use pacer function, avoid valsalva, and optimally exert effort within a safe and effective range via the Idris Exertion Rating Scale. Patient instructed to perform at a midrange of exertion and to complete 15-20 repetitions within appropriate split time, with proper technique, and while maintaining safety.          Tamica participated in therapeutic exercises to develop strength, endurance, ROM, flexibility, and posture for 30 minutes including:       Prone on elbows 2 min  Press ups 2/10  Bridging 2/10  Standing extension x10         9/7/2023     1:27 PM   [facility]   Visit Number 6   VAS Pain Rating 2   Treadmill Time (in min.) 10 min   Speed 2.8 mph   Extension in Lying 20   Extension in Standing 10   Lumbar Weight 59 lbs   Repetitions 15   Rating of Perceived Exertion 3      Peripheral muscle strengthening which included one set of 15-20 repetitions at a slow and controlled 10-13 second per rep pace focused on strengthening supporting musculature in order to improve body mechanics and functional mobility. Patient and therapist focused on proper form during treatment to ensure optimal strengthening of each targeted muscle group.  Machines utilized include torso rotation, leg extension, leg curl, chest press, upright row. Tricep extension, bicep curl, leg press, and hip abduction added visit 3    Tamica participated in dynamic functional therapeutic activities to improve functional performance and simulate household and community activities for 00  minutes. The following activities were included:        Tamica received manual therapy techniques for 00  minutes. The following activities were included:        Pt given cold pack for 00 minutes to low back in Z-lie.    Patient Education and Home Exercises     Home exercises include:  Prone on elbows  Press ups  Bridging  Standing  extension    Cardio program (V5): -9/5/23  Lifting education (V11): -  Posture/Lumbar roll: instructed visit 1  [employer] Discharge handout (date given): -  Equipment at home/gym membership:     Education provided:   - PT role and POC  - HEP  Sitting posture, daily stretching, use of lumbar roll, frequent extension throughout day    Written Home Exercises Provided: Patient instructed to cont prior HEP.  Exercises were reviewed and Tamica was able to demonstrate them prior to the end of the session.  Tamica demonstrated good  understanding of the education provided.     See EMR under Patient Instructions for exercises provided prior visit.    Assessment     Able to reduce pain with extension. Pt did need vc and tactile cueing of correct tech w/ bridging to engage the correct musculature. Decreased pain following session today. Increased resistance on MEDX, torso rotation and all peripheral machines without adverse effect and with appropriate perceived exertion.  Patient is making good progress towards established goals.  Pt will continue to benefit from skilled outpatient physical therapy to address the deficits stated in the impairment chart, provide pt/family education and to maximize pt's level of independence in the home and community environment.     Anticipated Barriers for therapy: none  Pt's spiritual, cultural and educational needs considered and pt agreeable to plan of care and goals as stated below:     Goals:      Short term goals:  6 weeks or 10 visits   - Pt will demonstrate increased lumbar ROM by at least 3 degrees from the initial ROM value with improvements noted in functional ROM and ability to perform ADLs. Appropriate and Ongoing  - Pt will demonstrate increased MedX average isometric strength value by 20% from initial test resulting in improved ability to perform bending, lifting, and carrying activities safely, confidently. Appropriate and Ongoing  - Pt will report a reduction in worst pain  score by 1-2 points for improved tolerance for sitting, bending, lifting. Appropriate and Ongoing  - Pt able to perform HEP correctly with minimal cueing or supervision from therapist to encourage independent management of symptoms. Appropriate and Ongoing     Long term goals: 10 weeks or 20 visits   - Pt will demonstrate increased lumbar ROM by at least 6 degrees from initial ROM value, resulting in improved ability to perform functional forward bending while standing and sitting. Appropriate and Ongoing  - Pt will demonstrate increased MedX average isometric strength value by 30% from initial test resulting in improved ability to perform bending, lifting, and carrying activities safely and confidently. Appropriate and Ongoing  - Pt to demonstrate ability to independently control and reduce their pain through posture positioning and mechanical movements throughout a typical day. Appropriate and Ongoing  - Pt will demonstrate reduced pain and improved functional outcomes as reported on the FOTO by reaching an intake score of >/= 10% functional ability in order to demonstrate subjective improvement in patient's condition. . Appropriate and Ongoing  - Pt will demonstrate independence with the HEP at discharge. Appropriate and Ongoing  - Pt will be able to reduce pain and prevent recurring episodes of pain(patient goal) Appropriate and Ongoing      Plan     Continue with established Plan of Care towards established PT goals.     Therapist: Mercedes Mcdonough, PTA  9/7/2023

## 2023-09-08 NOTE — TELEPHONE ENCOUNTER
AF with RVR noted 9/1 at 17:51pm, 1hr 34mins, MVR 133bpm    Current burden:  0.0%      Ventricular rates:      Anticoagulation status:  Eliquis 5mg BID  Flecainide 100mg PRN  TOPROL-XL 25mg daily      Patient symptoms:        9/8/23 10:04 AM  Yes, I remember.  I had been traveling all day, flight and driving.  I had just gotten home around that time and was unpacking my luggage when I felt my heart racing.  I took a flecainide and laid down for an hour then felt normal again.  izabel        Patient happy with POC

## 2023-09-11 ENCOUNTER — CLINICAL SUPPORT (OUTPATIENT)
Dept: REHABILITATION | Facility: HOSPITAL | Age: 68
End: 2023-09-11
Payer: MEDICARE

## 2023-09-11 DIAGNOSIS — M54.50 CHRONIC MIDLINE LOW BACK PAIN WITHOUT SCIATICA: Primary | ICD-10-CM

## 2023-09-11 DIAGNOSIS — M25.69 DECREASED ROM OF TRUNK AND BACK: ICD-10-CM

## 2023-09-11 DIAGNOSIS — G89.29 CHRONIC MIDLINE LOW BACK PAIN WITHOUT SCIATICA: Primary | ICD-10-CM

## 2023-09-11 PROCEDURE — 97112 NEUROMUSCULAR REEDUCATION: CPT | Mod: PO | Performed by: PHYSICAL THERAPIST

## 2023-09-11 PROCEDURE — 97110 THERAPEUTIC EXERCISES: CPT | Mod: PO | Performed by: PHYSICAL THERAPIST

## 2023-09-11 NOTE — PROGRESS NOTES
[facility]  Physical Therapy Treatment Note     Name: Tamica Cardona  Clinic Number: [id number]    Therapy Diagnosis:   Encounter Diagnoses   Name Primary?    Chronic midline low back pain without sciatica Yes    Decreased ROM of trunk and back            Physician: Leeanna Desai PA-C    Visit Date: 2023    Physician Orders: PT Eval and Treat- Healthy Back  Medical Diagnosis from Referral: Chronic midline low back pain without sciatica, Scoliosis of thoracolumbar spine  Evaluation Date: 2023  Authorization Period Expiration: 24  Plan of Care Expiration: 10/20/2023  Reassessment Due: 2023  Visit # / Visits authorized:  (+1 prior auth)    PTA Visit #:      Time In: 10:00PM  Time Out: 10:50PM  Total Billable Time: 50 minutes  INSURANCE and OUTCOMES: Fee for Service with FOTO Outcomes 1/3    Precautions: standard    Pattern of pain determined: Pattern 1 PEP     Subjective     Tamica Cardona reports she currently feels the same as she did when started. She admits, however, that she is not doing prone extension or using lumbar roll consistently. She has pain with sitting and with sweeping and other household chores. She is doing extension in standing more often.  Patient reports tolerating previous visit with increased soreness.  Patient reports their pain to be 2-3/10 on a 0-10 scale with 0 being no pain and 10 being the worst pain imaginable.  Pain Location: bilateral back , worse on right      Work and leisure: Occupation: retired secretarial/accounting work  Leisure: walk, dance, play with grandchildren, travel, reading, gardening     Pt goals:  prevent episodes of pain, reduce pain     Objective      Baseline IM Testing Results:   Date of testin23  ROM 0-36 deg   Max Peak Torque 100    Min Peak Torque 68    Flex/Ext Ratio 1.5:1   % below normative data 0       Outcomes:  Limitation Score: 14%  Visit 5 Score:   Visit 10 Score:    Discharge Score:     Treatment     Tamica received the treatments listed below:      Tamica received neuromuscular education for 15 minutes via participation on the [employer] Machine. Therapist assisted patient in isolating and engaging spinal stabilization musculature in order to improve functional ability and postural control. Patient performed exercise with therapist guidance in order to accurately use pacer function, avoid valsalva, and optimally exert effort within a safe and effective range via the Idris Exertion Rating Scale. Patient instructed to perform at a midrange of exertion and to complete 15-20 repetitions within appropriate split time, with proper technique, and while maintaining safety.          Tamica participated in therapeutic exercises to develop strength, endurance, ROM, flexibility, and posture for 35 minutes including:       Prone on elbows 2 min  Press ups 2/10  Bridging 2/10 (pre-set with arch into extension)  Standing extension x10         9/11/2023    10:31 AM   [facility]   Visit Number 7   VAS Pain Rating 2   Treadmill Time (in min.) 10 min   Speed 2.8 mph   Extension in Lying 20   Extension in Standing 10   Lumbar Weight 59 lbs   Repetitions 20   Rating of Perceived Exertion 3          Peripheral muscle strengthening which included one set of 15-20 repetitions at a slow and controlled 10-13 second per rep pace focused on strengthening supporting musculature in order to improve body mechanics and functional mobility. Patient and therapist focused on proper form during treatment to ensure optimal strengthening of each targeted muscle group.  Machines utilized include torso rotation, leg extension, leg curl, chest press, upright row. Tricep extension, bicep curl, leg press, and hip abduction added visit 3    Tamica participated in dynamic functional therapeutic activities to improve functional performance and simulate household and community activities for 00  minutes. The  following activities were included:        Tamica received manual therapy techniques for 00  minutes. The following activities were included:        Pt given cold pack for 00 minutes to low back in Z-lie.    Patient Education and Home Exercises     Home exercises include:  Prone on elbows  Press ups  Bridging  Standing extension    Cardio program (V5): -9/5/23  Lifting education (V11): -  Posture/Lumbar roll: instructed visit 1  [employer] Discharge handout (date given): -  Equipment at home/gym membership:     Education provided:   - PT role and POC  - HEP  Sitting posture, daily stretching, use of lumbar roll, frequent extension throughout day    Written Home Exercises Provided: Patient instructed to cont prior HEP.  Exercises were reviewed and Tamica was able to demonstrate them prior to the end of the session.  Tamica demonstrated good  understanding of the education provided.     See EMR under Patient Instructions for exercises provided prior visit.    Assessment     Discussed with pt importance of consistency with HEP especially prone extension to end range. Instructed in exhale to achieve self overpressure. Also discussed use of lumbar support consistently with sitting, especially in car since she is aware of pain getting out of car. Pt expressed understanding. Able to reduce pain with exercise and activity today. Also reviewed and instructed in proper bending and reaching with housework activities.  Patient is making good progress towards established goals.  Pt will continue to benefit from skilled outpatient physical therapy to address the deficits stated in the impairment chart, provide pt/family education and to maximize pt's level of independence in the home and community environment.     Anticipated Barriers for therapy: none  Pt's spiritual, cultural and educational needs considered and pt agreeable to plan of care and goals as stated below:     Goals:      Short term goals:  6 weeks or 10 visits    - Pt will demonstrate increased lumbar ROM by at least 3 degrees from the initial ROM value with improvements noted in functional ROM and ability to perform ADLs. Appropriate and Ongoing  - Pt will demonstrate increased MedX average isometric strength value by 20% from initial test resulting in improved ability to perform bending, lifting, and carrying activities safely, confidently. Appropriate and Ongoing  - Pt will report a reduction in worst pain score by 1-2 points for improved tolerance for sitting, bending, lifting. Appropriate and Ongoing  - Pt able to perform HEP correctly with minimal cueing or supervision from therapist to encourage independent management of symptoms. Appropriate and Ongoing     Long term goals: 10 weeks or 20 visits   - Pt will demonstrate increased lumbar ROM by at least 6 degrees from initial ROM value, resulting in improved ability to perform functional forward bending while standing and sitting. Appropriate and Ongoing  - Pt will demonstrate increased MedX average isometric strength value by 30% from initial test resulting in improved ability to perform bending, lifting, and carrying activities safely and confidently. Appropriate and Ongoing  - Pt to demonstrate ability to independently control and reduce their pain through posture positioning and mechanical movements throughout a typical day. Appropriate and Ongoing  - Pt will demonstrate reduced pain and improved functional outcomes as reported on the FOTO by reaching an intake score of >/= 10% functional ability in order to demonstrate subjective improvement in patient's condition. . Appropriate and Ongoing  - Pt will demonstrate independence with the HEP at discharge. Appropriate and Ongoing  - Pt will be able to reduce pain and prevent recurring episodes of pain(patient goal) Appropriate and Ongoing      Plan     Continue with established Plan of Care towards established PT goals.     Therapist: Rema Pierre  PT  9/11/2023

## 2023-09-13 ENCOUNTER — CLINICAL SUPPORT (OUTPATIENT)
Dept: REHABILITATION | Facility: HOSPITAL | Age: 68
End: 2023-09-13
Payer: MEDICARE

## 2023-09-13 DIAGNOSIS — M54.50 CHRONIC MIDLINE LOW BACK PAIN WITHOUT SCIATICA: Primary | ICD-10-CM

## 2023-09-13 DIAGNOSIS — G89.29 CHRONIC MIDLINE LOW BACK PAIN WITHOUT SCIATICA: Primary | ICD-10-CM

## 2023-09-13 DIAGNOSIS — M25.69 DECREASED ROM OF TRUNK AND BACK: ICD-10-CM

## 2023-09-13 PROCEDURE — 97112 NEUROMUSCULAR REEDUCATION: CPT | Mod: PO | Performed by: PHYSICAL THERAPIST

## 2023-09-13 PROCEDURE — 97110 THERAPEUTIC EXERCISES: CPT | Mod: PO | Performed by: PHYSICAL THERAPIST

## 2023-09-13 NOTE — PROGRESS NOTES
[facility]  Physical Therapy Treatment Note     Name: Tamica Cardona  Clinic Number: [id number]    Therapy Diagnosis:   Encounter Diagnoses   Name Primary?    Chronic midline low back pain without sciatica Yes    Decreased ROM of trunk and back            Physician: Leeanna Desai PA-C    Visit Date: 2023    Physician Orders: PT Eval and Treat- Healthy Back  Medical Diagnosis from Referral: Chronic midline low back pain without sciatica, Scoliosis of thoracolumbar spine  Evaluation Date: 2023  Authorization Period Expiration: 24  Plan of Care Expiration: 10/20/2023  Reassessment Due: 2023  Visit # / Visits authorized:  (+1 prior auth)    PTA Visit #:      Time In: 10:00PM  Time Out: 10:45PM  Total Billable Time: 45 minutes  INSURANCE and OUTCOMES: Fee for Service with FOTO Outcomes 1/3    Precautions: standard    Pattern of pain determined: Pattern 1 PEP     Subjective     Tamica Cardona reports she has done her exercises as instructed 2x/day for last 2 days and is using lumbar roll more consistently. Central pain during extension, but reduced pain following extension.  Patient reports tolerating previous visit with increased soreness.  Patient reports their pain to be 1-2/10 on a 0-10 scale with 0 being no pain and 10 being the worst pain imaginable.  Pain Location: bilateral back , worse on right      Work and leisure: Occupation: retired secretarial/accounting work  Leisure: walk, dance, play with grandchildren, travel, reading, gardening     Pt goals:  prevent episodes of pain, reduce pain     Objective      Baseline IM Testing Results:   Date of testin23  ROM 0-36 deg   Max Peak Torque 100    Min Peak Torque 68    Flex/Ext Ratio 1.5:1   % below normative data 0       Outcomes:  Limitation Score: 14%  Visit 5 Score: 14%  Visit 10 Score:   Discharge Score:     Treatment     Tamica received the treatments listed below:      Tamica  received neuromuscular education for 15 minutes via participation on the [place] Machine. Therapist assisted patient in isolating and engaging spinal stabilization musculature in order to improve functional ability and postural control. Patient performed exercise with therapist guidance in order to accurately use pacer function, avoid valsalva, and optimally exert effort within a safe and effective range via the Idris Exertion Rating Scale. Patient instructed to perform at a midrange of exertion and to complete 15-20 repetitions within appropriate split time, with proper technique, and while maintaining safety.          Tamica participated in therapeutic exercises to develop strength, endurance, ROM, flexibility, and posture for 30 minutes including:       Prone on elbows 2 min  Press ups 2/10  Bridging 2/10 (pre-set with arch into extension)  Standing extension x10         9/13/2023    10:16 AM   [facility]   Visit Number 8   VAS Pain Rating 1   Treadmill Time (in min.) 10 min   Speed 2.8 mph   Extension in Lying 20   Extension in Standing 10   Lumbar Weight 62 lbs   Repetitions 15   Rating of Perceived Exertion 4       Peripheral muscle strengthening which included one set of 15-20 repetitions at a slow and controlled 10-13 second per rep pace focused on strengthening supporting musculature in order to improve body mechanics and functional mobility. Patient and therapist focused on proper form during treatment to ensure optimal strengthening of each targeted muscle group.  Machines utilized include torso rotation, leg extension, leg curl, chest press, upright row. Tricep extension, bicep curl, leg press, and hip abduction added visit 3    Tamica participated in dynamic functional therapeutic activities to improve functional performance and simulate household and community activities for 00  minutes. The following activities were included:        Tamica received manual therapy techniques for 00   minutes. The following activities were included:        Pt given cold pack for 00 minutes to low back in Z-lie.    Patient Education and Home Exercises     Home exercises include:  Prone on elbows  Press ups  Bridging  Standing extension    Cardio program (V5): -9/5/23  Lifting education (V11): -  Posture/Lumbar roll: instructed visit 1  [employer] Discharge handout (date given): -  Equipment at home/gym membership:     Education provided:   - PT role and POC  - HEP  Sitting posture, daily stretching, use of lumbar roll, frequent extension throughout day    Written Home Exercises Provided: Patient instructed to cont prior HEP.  Exercises were reviewed and Tamica was able to demonstrate them prior to the end of the session.  Tamica demonstrated good  understanding of the education provided.     See EMR under Patient Instructions for exercises provided prior visit.    Assessment     Still has some difficulty with first few reps of bridging, but then improves as she does more. Muscle fatigue with pain , but appropriate perceived exertion on MEDX, but resolved immediately. Discussed with pt that muscle fatigue is expected as we isolate those muscles. Encouraged that pt is more compliant with HEP and sitting posture to achieve better outcome.  Patient is making good progress towards established goals.  Pt will continue to benefit from skilled outpatient physical therapy to address the deficits stated in the impairment chart, provide pt/family education and to maximize pt's level of independence in the home and community environment.     Anticipated Barriers for therapy: none  Pt's spiritual, cultural and educational needs considered and pt agreeable to plan of care and goals as stated below:     Goals:      Short term goals:  6 weeks or 10 visits   - Pt will demonstrate increased lumbar ROM by at least 3 degrees from the initial ROM value with improvements noted in functional ROM and ability to perform ADLs.  Appropriate and Ongoing  - Pt will demonstrate increased MedX average isometric strength value by 20% from initial test resulting in improved ability to perform bending, lifting, and carrying activities safely, confidently. Appropriate and Ongoing  - Pt will report a reduction in worst pain score by 1-2 points for improved tolerance for sitting, bending, lifting. Appropriate and Ongoing  - Pt able to perform HEP correctly with minimal cueing or supervision from therapist to encourage independent management of symptoms. Appropriate and Ongoing     Long term goals: 10 weeks or 20 visits   - Pt will demonstrate increased lumbar ROM by at least 6 degrees from initial ROM value, resulting in improved ability to perform functional forward bending while standing and sitting. Appropriate and Ongoing  - Pt will demonstrate increased MedX average isometric strength value by 30% from initial test resulting in improved ability to perform bending, lifting, and carrying activities safely and confidently. Appropriate and Ongoing  - Pt to demonstrate ability to independently control and reduce their pain through posture positioning and mechanical movements throughout a typical day. Appropriate and Ongoing  - Pt will demonstrate reduced pain and improved functional outcomes as reported on the FOTO by reaching an intake score of >/= 10% functional ability in order to demonstrate subjective improvement in patient's condition. . Appropriate and Ongoing  - Pt will demonstrate independence with the HEP at discharge. Appropriate and Ongoing  - Pt will be able to reduce pain and prevent recurring episodes of pain(patient goal) Appropriate and Ongoing      Plan     Continue with established Plan of Care towards established PT goals.     Therapist: Rema Pierre, PT  9/13/2023

## 2023-09-19 ENCOUNTER — CLINICAL SUPPORT (OUTPATIENT)
Dept: REHABILITATION | Facility: HOSPITAL | Age: 68
End: 2023-09-19
Payer: MEDICARE

## 2023-09-19 DIAGNOSIS — G89.29 CHRONIC MIDLINE LOW BACK PAIN WITHOUT SCIATICA: Primary | ICD-10-CM

## 2023-09-19 DIAGNOSIS — M54.50 CHRONIC MIDLINE LOW BACK PAIN WITHOUT SCIATICA: Primary | ICD-10-CM

## 2023-09-19 DIAGNOSIS — M25.69 DECREASED ROM OF TRUNK AND BACK: ICD-10-CM

## 2023-09-19 PROCEDURE — 97110 THERAPEUTIC EXERCISES: CPT | Mod: PO,CQ

## 2023-09-19 PROCEDURE — 97112 NEUROMUSCULAR REEDUCATION: CPT | Mod: PO,CQ

## 2023-09-19 NOTE — PROGRESS NOTES
[facility]  Physical Therapy Treatment Note     Name: Tamica Cardona  Clinic Number: [id number]    Therapy Diagnosis:   Encounter Diagnoses   Name Primary?    Chronic midline low back pain without sciatica Yes    Decreased ROM of trunk and back      Physician: Leeanna Desai PA-C    Visit Date: 2023    Physician Orders: PT Eval and Treat- Healthy Back  Medical Diagnosis from Referral: Chronic midline low back pain without sciatica, Scoliosis of thoracolumbar spine  Evaluation Date: 2023  Authorization Period Expiration: 24  Plan of Care Expiration: 10/20/2023  Reassessment Due: 2023  Visit # / Visits authorized:  (+1 prior auth)    PTA Visit #:      Time In: 10:45 PM  Time Out: 11:33 PM  Total Billable Time: 45 minutes  INSURANCE and OUTCOMES: Fee for Service with FOTO Outcomes 1/3    Precautions: standard    Pattern of pain determined: Pattern 1 PEP     Subjective     Tamica Cardona reports she road in a car for 7 hrs and was picking her grandchild up which gave her LBP. She stretched but not enough.   Patient reports tolerating previous visit with increased soreness.  Patient reports their pain to be 1-2/10 on a 0-10 scale with 0 being no pain and 10 being the worst pain imaginable.  Pain Location: bilateral back , worse on right      Work and leisure: Occupation: retired secretarial/accounting work  Leisure: walk, dance, play with grandchildren, travel, reading, gardening     Pt goals:  prevent episodes of pain, reduce pain     Objective      Baseline IM Testing Results:   Date of testin23  ROM 0-36 deg   Max Peak Torque 100    Min Peak Torque 68    Flex/Ext Ratio 1.5:1   % below normative data 0       Outcomes:  Limitation Score: 14%  Visit 5 Score: 14%  Visit 10 Score:   Discharge Score:     Treatment     Tamica received the treatments listed below:      Tamica received neuromuscular education for 15 minutes via participation  on the [employer] Machine. Therapist assisted patient in isolating and engaging spinal stabilization musculature in order to improve functional ability and postural control. Patient performed exercise with therapist guidance in order to accurately use pacer function, avoid valsalva, and optimally exert effort within a safe and effective range via the Idris Exertion Rating Scale. Patient instructed to perform at a midrange of exertion and to complete 15-20 repetitions within appropriate split time, with proper technique, and while maintaining safety.          Tamica participated in therapeutic exercises to develop strength, endurance, ROM, flexibility, and posture for 30 minutes including:       Prone on elbows 2 min  Press ups 2/10  Bridging 2/10 (pre-set with arch into extension)  Standing extension x10         9/19/2023    11:02 AM   [facility]   Visit Number 9   VAS Pain Rating 2   Treadmill Time (in min.) 10 min took subjective   Speed 2.8 mph   Extension in Lying 20   Extension in Standing 10   Lumbar Weight 62 lbs   Repetitions 18   Rating of Perceived Exertion 4        Peripheral muscle strengthening which included one set of 15-20 repetitions at a slow and controlled 10-13 second per rep pace focused on strengthening supporting musculature in order to improve body mechanics and functional mobility. Patient and therapist focused on proper form during treatment to ensure optimal strengthening of each targeted muscle group.  Machines utilized include torso rotation, leg extension, leg curl, chest press, upright row. Tricep extension, bicep curl, leg press, and hip abduction added visit 3    Tamica participated in dynamic functional therapeutic activities to improve functional performance and simulate household and community activities for 00  minutes. The following activities were included:        Tamica received manual therapy techniques for 00  minutes. The following activities were  included:        Pt given cold pack for 00 minutes to low back in Z-lie.    Patient Education and Home Exercises     Home exercises include:  Prone on elbows  Press ups  Bridging  Standing extension    Cardio program (V5): -9/5/23  Lifting education (V11): -  Posture/Lumbar roll: instructed visit 1  [employer] Discharge handout (date given): -  Equipment at home/gym membership:     Education provided:   - PT role and POC  - HEP  Sitting posture, daily stretching, use of lumbar roll, frequent extension throughout day    Written Home Exercises Provided: Patient instructed to cont prior HEP.  Exercises were reviewed and Tamica was able to demonstrate them prior to the end of the session.  Tamica demonstrated good  understanding of the education provided.     See EMR under Patient Instructions for exercises provided prior visit.    Assessment     Pt's pain decreased with session. She progressed to more reps on lumbar machine and appropriate perceived exertion on all other machines. Cont to encourage daily HEP.   Patient is making good progress towards established goals.  Pt will continue to benefit from skilled outpatient physical therapy to address the deficits stated in the impairment chart, provide pt/family education and to maximize pt's level of independence in the home and community environment.     Anticipated Barriers for therapy: none  Pt's spiritual, cultural and educational needs considered and pt agreeable to plan of care and goals as stated below:     Goals:      Short term goals:  6 weeks or 10 visits   - Pt will demonstrate increased lumbar ROM by at least 3 degrees from the initial ROM value with improvements noted in functional ROM and ability to perform ADLs. Appropriate and Ongoing  - Pt will demonstrate increased MedX average isometric strength value by 20% from initial test resulting in improved ability to perform bending, lifting, and carrying activities safely, confidently. Appropriate and  Ongoing  - Pt will report a reduction in worst pain score by 1-2 points for improved tolerance for sitting, bending, lifting. Appropriate and Ongoing  - Pt able to perform HEP correctly with minimal cueing or supervision from therapist to encourage independent management of symptoms. Appropriate and Ongoing     Long term goals: 10 weeks or 20 visits   - Pt will demonstrate increased lumbar ROM by at least 6 degrees from initial ROM value, resulting in improved ability to perform functional forward bending while standing and sitting. Appropriate and Ongoing  - Pt will demonstrate increased MedX average isometric strength value by 30% from initial test resulting in improved ability to perform bending, lifting, and carrying activities safely and confidently. Appropriate and Ongoing  - Pt to demonstrate ability to independently control and reduce their pain through posture positioning and mechanical movements throughout a typical day. Appropriate and Ongoing  - Pt will demonstrate reduced pain and improved functional outcomes as reported on the FOTO by reaching an intake score of >/= 10% functional ability in order to demonstrate subjective improvement in patient's condition. . Appropriate and Ongoing  - Pt will demonstrate independence with the HEP at discharge. Appropriate and Ongoing  - Pt will be able to reduce pain and prevent recurring episodes of pain(patient goal) Appropriate and Ongoing      Plan     Continue with established Plan of Care towards established PT goals.     Therapist: Mercedes Mcdonough, PTA  9/19/2023

## 2023-09-20 ENCOUNTER — PATIENT MESSAGE (OUTPATIENT)
Dept: CARDIOLOGY | Facility: HOSPITAL | Age: 68
End: 2023-09-20
Payer: MEDICARE

## 2023-09-25 ENCOUNTER — CLINICAL SUPPORT (OUTPATIENT)
Dept: REHABILITATION | Facility: HOSPITAL | Age: 68
End: 2023-09-25
Payer: MEDICARE

## 2023-09-25 ENCOUNTER — CLINICAL SUPPORT (OUTPATIENT)
Dept: CARDIOLOGY | Facility: HOSPITAL | Age: 68
End: 2023-09-25
Payer: MEDICARE

## 2023-09-25 DIAGNOSIS — Z95.818 PRESENCE OF OTHER CARDIAC IMPLANTS AND GRAFTS: ICD-10-CM

## 2023-09-25 DIAGNOSIS — M54.50 CHRONIC MIDLINE LOW BACK PAIN WITHOUT SCIATICA: Primary | ICD-10-CM

## 2023-09-25 DIAGNOSIS — G89.29 CHRONIC MIDLINE LOW BACK PAIN WITHOUT SCIATICA: Primary | ICD-10-CM

## 2023-09-25 DIAGNOSIS — M25.69 DECREASED ROM OF TRUNK AND BACK: ICD-10-CM

## 2023-09-25 PROCEDURE — 97750 PHYSICAL PERFORMANCE TEST: CPT | Mod: PO | Performed by: PHYSICAL THERAPIST

## 2023-09-25 PROCEDURE — 97110 THERAPEUTIC EXERCISES: CPT | Mod: PO | Performed by: PHYSICAL THERAPIST

## 2023-09-25 PROCEDURE — 93298 CARDIAC DEVICE CHECK - REMOTE: ICD-10-PCS | Mod: ,,, | Performed by: INTERNAL MEDICINE

## 2023-09-25 PROCEDURE — 93298 REM INTERROG DEV EVAL SCRMS: CPT | Mod: ,,, | Performed by: INTERNAL MEDICINE

## 2023-09-25 NOTE — TELEPHONE ENCOUNTER
atrial fibrillation with RVR noted during device remote check:    Current burden:  0.1%     Max duration seen: multiple episodes noted 9/18 between 7:27-9:17 am, longest single event 26mins          Ventricular rates:   Controlled    Needs improvement  30day histogram:      Anticoagulation status:  Eliquis 5mg BID  Flecainide 100mg BID        Patient symptoms: Yes I did notice my heart was racing.  I was not at home to check my pulse so I waited a while to see if it would correct itself.  But it didnt, so I took flecainide to bring it back down.       Yes I am satisfied with this course of treatment and do not wish to change anything at this time.  Thank you.

## 2023-09-25 NOTE — PROGRESS NOTES
[facility]  Physical Therapy Treatment Note     Name: Tamica Cardona  Clinic Number: [id number]    Therapy Diagnosis:   Encounter Diagnoses   Name Primary?    Chronic midline low back pain without sciatica Yes    Decreased ROM of trunk and back        Physician: Leeanna Desai PA-C    Visit Date: 2023    Physician Orders: PT Eval and Treat- Healthy Back  Medical Diagnosis from Referral: Chronic midline low back pain without sciatica, Scoliosis of thoracolumbar spine  Evaluation Date: 2023  Authorization Period Expiration: 24  Plan of Care Expiration: 10/20/2023  Reassessment: 23  Reassessment Due: visit 20  Visit # / Visits authorized:  (+1 prior auth)    PTA Visit #: 0/5     Time In: 11:25 PM  Time Out: 12:30PM  Total Billable Time: 55 minutes  INSURANCE and OUTCOMES: Fee for Service with FOTO Outcomes 1/3    Precautions: standard    Pattern of pain determined: Pattern 1 PEP     Subjective     Tamica Cardona reports she has been doing her exercises since last visit more consistently, but has not noticed a change in pain. She does ,however, follow her extension stretching with standing flexion stretch, stating it feels better to do that.Patient reports tolerating previous visit without change in symptoms. She did tolerate 7 hour car ride with decreased pain with use of lumbar support.  Patient reports their pain to be 2/10 on a 0-10 scale with 0 being no pain and 10 being the worst pain imaginable.  Pain Location: bilateral back , worse on right      Work and leisure: Occupation: retired secretarial/accounting work  Leisure: walk, dance, play with grandchildren, travel, reading, gardening     Pt goals:  prevent episodes of pain, reduce pain     Objective      Baseline IM Testing Results:   Date of testin23  ROM 0-48 deg 0-36 deg   Max Peak Torque 118 100    Min Peak Torque 101 68    Flex/Ext  Ratio 1.2:1 1.5:1   % below normative data  0   % above normative data 21        Outcomes:  Limitation Score: 14%  Visit 5 Score: 14%  Visit 10 Score: 12%  Discharge Score:     Treatment     Tamica received the treatments listed below:      Tamica was seen for performance testing for spinal musculature  to engage spinal musculature correctly for isometric testing within patient's range of motion, with comparison to baseline testing for  15 minutes.  This includes the MedX practice exercise component and practice and standard testing.  Med x dynamic exercise and testing performed with instructions to guide the patient safely through the isometric testing.  Patient informed to perform isometric test correctly, and safely, building best force they safely can and not pushing through pain.  Patient demonstrated understanding of information       Tamica received neuromuscular education for 00 minutes via participation on the [employer] Machine. Therapist assisted patient in isolating and engaging spinal stabilization musculature in order to improve functional ability and postural control. Patient performed exercise with therapist guidance in order to accurately use pacer function, avoid valsalva, and optimally exert effort within a safe and effective range via the Idris Exertion Rating Scale. Patient instructed to perform at a midrange of exertion and to complete 15-20 repetitions within appropriate split time, with proper technique, and while maintaining safety.          Tamica participated in therapeutic exercises to develop strength, endurance, ROM, flexibility, and posture for 40 minutes including:    KTC 5x 5 sec  LTR 5x 5 sec ea  PPT x20  Seated lumbar flexion 5x 5 sec    Hold x2 days:   Prone on elbows 2 min  Press ups 2/10  Bridging 2/10 (pre-set with arch into extension)  Standing extension x10         9/19/2023    11:02 AM   [facility]   Visit Number 9   VAS Pain Rating 2   Treadmill Time (in min.) 10  min took subjective   Speed 2.8 mph   Extension in Lying 20   Extension in Standing 10   Lumbar Weight 62 lbs   Repetitions 18   Rating of Perceived Exertion 4        Peripheral muscle strengthening which included one set of 15-20 repetitions at a slow and controlled 10-13 second per rep pace focused on strengthening supporting musculature in order to improve body mechanics and functional mobility. Patient and therapist focused on proper form during treatment to ensure optimal strengthening of each targeted muscle group.  Machines utilized include torso rotation, leg extension, leg curl, chest press, upright row. Tricep extension, bicep curl, leg press, and hip abduction added visit 3    Tamica participated in dynamic functional therapeutic activities to improve functional performance and simulate household and community activities for 00  minutes. The following activities were included:        Tamica received manual therapy techniques for 00  minutes. The following activities were included:        Pt given cold pack for 00 minutes to low back in Z-lie.    Patient Education and Home Exercises     Home exercises include:  KTC  LTR  PPT  Seated flexion    Hold x2 days:  Prone on elbows  Press ups  Bridging  Standing extension    Cardio program (V5): -9/5/23  Lifting education (V11): -  Posture/Lumbar roll: instructed visit 1  [employer] Discharge handout (date given): -  Equipment at home/gym membership:     Education provided:   - PT role and POC  - HEP  Sitting posture, daily stretching, use of lumbar roll, frequent extension throughout day    Written Home Exercises Provided: Patient instructed to cont prior HEP.  Exercises were reviewed and Tamica was able to demonstrate them prior to the end of the session.  Tamica demonstrated good  understanding of the education provided.     See EMR under Patient Instructions for exercises provided prior visit.    Assessment     Instructed pt in rotation and flexion  exercises as she felt she was not improving with being more consistent with extension exercises. However, she has been following up her extension with flexion. Instructed in flexion and rotation exercises today for pt to perform x2 days only. When she got up from stretching, she had increased difficulty straightening up. Suspect pt still needs more frequent and consistent extension, but encouraged her to try flexion exercises x2 days to monitor for change. If sx worsen, she is to resume extension only.     Reassessment 9/25/23:  Patient has attended 10 visits at [facility] which included MD evaluation, PT evaluation with isometric testing, and physical therapy treatment including HEP instruction, education, aerobic activity, dynamic strengthening on MedX equipment for the spine, and whole body strengthening on MedX equipment with increasing resistance. Patient demonstrates increased ability to reduce symptoms, improve posture, improve ROM, and improve strength, as stated below:    -Improved posture, she is using lumbar roll  -Improved lumbar ROM, 0-36 degrees initially on MedX test and 0-48 degrees currently.  -Improved strength at each test point on lumbar MedX isometric test with 26% average improvement noted with reduced pain noted by patient.  -Initial outcome tool score of 14% and current outcome tool score 12% indicating reduced pain and improved function.        Patient is making good progress towards established goals.  Pt will continue to benefit from skilled outpatient physical therapy to address the deficits stated in the impairment chart, provide pt/family education and to maximize pt's level of independence in the home and community environment.     Anticipated Barriers for therapy: none  Pt's spiritual, cultural and educational needs considered and pt agreeable to plan of care and goals as stated below:     Goals:      Short term goals:  6 weeks or 10 visits   - Pt will demonstrate increased  lumbar ROM by at least 3 degrees from the initial ROM value with improvements noted in functional ROM and ability to perform ADLs. Appropriate and Ongoing Met 9/25/23  - Pt will demonstrate increased MedX average isometric strength value by 20% from initial test resulting in improved ability to perform bending, lifting, and carrying activities safely, confidently. Appropriate and Ongoing Met 9/25/23  - Pt will report a reduction in worst pain score by 1-2 points for improved tolerance for sitting, bending, lifting. Appropriate and Ongoing Not met  - Pt able to perform HEP correctly with minimal cueing or supervision from therapist to encourage independent management of symptoms. Appropriate and Ongoing Met 9/25/23     Long term goals: 10 weeks or 20 visits   - Pt will demonstrate increased lumbar ROM by at least 6 degrees from initial ROM value, resulting in improved ability to perform functional forward bending while standing and sitting. Appropriate and Ongoing Met 9/25/23  - Pt will demonstrate increased MedX average isometric strength value by 30% from initial test resulting in improved ability to perform bending, lifting, and carrying activities safely and confidently. Appropriate and Ongoing Progressing  - Pt to demonstrate ability to independently control and reduce their pain through posture positioning and mechanical movements throughout a typical day. Appropriate and Ongoing Not met  - Pt will demonstrate reduced pain and improved functional outcomes as reported on the FOTO by reaching an intake score of >/= 10% functional ability in order to demonstrate subjective improvement in patient's condition. . Appropriate and Ongoing Progressing  - Pt will demonstrate independence with the HEP at discharge. Appropriate and Ongoing  - Pt will be able to reduce pain and prevent recurring episodes of pain(patient goal) Appropriate and Ongoing      Plan     Continue with established Plan of Care towards established PT  goals.     Therapist: Rema Pierre, PT  9/25/2023

## 2023-09-25 NOTE — PATIENT INSTRUCTIONS
DOUBLE KNEE TO CHEST STRETCH - DKTC    While Lying on your back, hold your knees and gently pull them up towards your chest. Hold 5 seconds.     LOWER TRUNK ROTATIONS - LTR - WIG WAGS - KNEE ROCKS    Lying on your back with your knees bent, gently rotate your spine as you move your knees to the side and then reverse directions and move your knees to the other side. Repeat as you move through a comfortable range of motion. Hold 5 seconds. Repeat 5x ea way.    POSTERIOR PELVIC TILT    Lie on your back on a firm surface with knees comfortably bent (top picture).  Then flatten back against the table while bina abdominal muscles as if pulling belly button toward ribs (bottom picture).   Hold 3-5 seconds. Repeat 20x         Seated Lumbar Flexion    Sit on the edge of a chair or bench with your legs apart. Round your low back and reach to the ground in between your legs. Engage your abs like you are doing a crunch to help you round your low back. Return to an upright seated position and repeat. Hold 5 seconds. Repeat 5x.

## 2023-09-27 ENCOUNTER — CLINICAL SUPPORT (OUTPATIENT)
Dept: REHABILITATION | Facility: HOSPITAL | Age: 68
End: 2023-09-27
Payer: MEDICARE

## 2023-09-27 DIAGNOSIS — G89.29 CHRONIC MIDLINE LOW BACK PAIN WITHOUT SCIATICA: Primary | ICD-10-CM

## 2023-09-27 DIAGNOSIS — M25.69 DECREASED ROM OF TRUNK AND BACK: ICD-10-CM

## 2023-09-27 DIAGNOSIS — M54.50 CHRONIC MIDLINE LOW BACK PAIN WITHOUT SCIATICA: Primary | ICD-10-CM

## 2023-09-27 PROCEDURE — 97112 NEUROMUSCULAR REEDUCATION: CPT | Mod: PO | Performed by: PHYSICAL THERAPIST

## 2023-09-27 PROCEDURE — 97110 THERAPEUTIC EXERCISES: CPT | Mod: PO | Performed by: PHYSICAL THERAPIST

## 2023-09-27 NOTE — PROGRESS NOTES
[facility]  Physical Therapy Treatment Note   0  Name: Tamica Cardona  Clinic Number: [id number]    Therapy Diagnosis:   Encounter Diagnoses   Name Primary?    Chronic midline low back pain without sciatica Yes    Decreased ROM of trunk and back        Physician: Leeanna Desai PA-C    Visit Date: 2023    Physician Orders: PT Eval and Treat- [facility]  Medical Diagnosis from Referral: Chronic midline low back pain without sciatica, Scoliosis of thoracolumbar spine  Evaluation Date: 2023  Authorization Period Expiration: 24  Plan of Care Expiration: 10/20/2023  Reassessment: 23  Reassessment Due: visit 20  Visit # / Visits authorized: 10/20 (+1 prior auth)    PTA Visit #: 0/     Time In: 11:30 PM  Time Out: 12:30PM  Total Billable Time: 50 minutes  INSURANCE and OUTCOMES: Fee for Service with FOTO Outcomes 1/3    Precautions: standard    Pattern of pain determined: Pattern 1 PEP , switch to pattern 2 23    Subjective     Tamica Cardona reports decreased pain since beginning flexion biased exercises and holding on extension. She is able to rise from doing her exercises without increase of pain.  Patient reports tolerating previous visit   Patient reports their pain to be 1/10 on a 0-10 scale with 0 being no pain and 10 being the worst pain imaginable.  Pain Location: bilateral back , worse on right      Work and leisure: Occupation: retired secretarial/accounting work  Leisure: walk, dance, play with grandchildren, travel, reading, gardening     Pt goals:  prevent episodes of pain, reduce pain     Objective      Baseline IM Testing Results:   Date of testin23  ROM 0-48 deg 0-36 deg   Max Peak Torque 118 100    Min Peak Torque 101 68    Flex/Ext Ratio 1.2:1 1.5:1   % below normative data  0   % above normative data 21        Outcomes:  Limitation Score: 14%  Visit 5 Score: 14%  Visit 10 Score:  12%  Discharge Score:     Treatment     Tamica received the treatments listed below:        Tamica received neuromuscular education for 10 minutes via participation on the [employer] Machine. Therapist assisted patient in isolating and engaging spinal stabilization musculature in order to improve functional ability and postural control. Patient performed exercise with therapist guidance in order to accurately use pacer function, avoid valsalva, and optimally exert effort within a safe and effective range via the Idris Exertion Rating Scale. Patient instructed to perform at a midrange of exertion and to complete 15-20 repetitions within appropriate split time, with proper technique, and while maintaining safety.          Tamica participated in therapeutic exercises to develop strength, endurance, ROM, flexibility, and posture for 40 minutes including:    KTC 5x 5 sec  LTR 5x 5 sec ea  PPT x20  Seated lumbar flexion 5x 5 sec  Instruct in do's and don'ts of lifting and bending    Hold:   Prone on elbows 2 min  Press ups 2/10  Bridging 2/10 (pre-set with arch into extension)  Standing extension x10         9/27/2023    11:51 AM   [facility]   Visit Number 11   VAS Pain Rating 1   Treadmill Time (in min.) 10 min   Speed 2.8 mph   Flexion in Lying 5   Flexion in Sitting 5   Lumbar Weight 62 lbs   Repetitions 20   Rating of Perceived Exertion 4   Ice - Z Lie (in min.) 10                Peripheral muscle strengthening which included one set of 15-20 repetitions at a slow and controlled 10-13 second per rep pace focused on strengthening supporting musculature in order to improve body mechanics and functional mobility. Patient and therapist focused on proper form during treatment to ensure optimal strengthening of each targeted muscle group.  Machines utilized include torso rotation, leg extension, leg curl, chest press, upright row. Tricep extension, bicep curl, leg press, and hip abduction added visit 3    Tamica  participated in dynamic functional therapeutic activities to improve functional performance and simulate household and community activities for 00  minutes. The following activities were included:        Tamica received manual therapy techniques for 00  minutes. The following activities were included:        Pt given cold pack for 10 minutes to low back in Z-lie.    Patient Education and Home Exercises     Home exercises include:  KTC  LTR  PPT  Seated flexion    Hold x2 days:  Prone on elbows  Press ups  Bridging  Standing extension    Cardio program (V5): -9/5/23  Lifting education (V11): - 9/27/23  Posture/Lumbar roll: instructed visit 1  [employer] Discharge handout (date given): -  Equipment at home/gym membership:     Education provided:   - PT role and POC  - HEP  Sitting posture, daily stretching, use of lumbar roll, frequent extension throughout day    Written Home Exercises Provided: Patient instructed to cont prior HEP.  Exercises were reviewed and Tamica was able to demonstrate them prior to the end of the session.  Tamica demonstrated good  understanding of the education provided.     See EMR under Patient Instructions for exercises provided prior visit.    Assessment     Pt apparently notices improvement with flexion biased exercises. Continued as above. If she continues to do well with those, will progress lumbar stabilization exercises. Instructed in improved body mechanics with bending and lifting and given handout for reference.     Reassessment 9/25/23:  Patient has attended 10 visits at [facility] which included MD evaluation, PT evaluation with isometric testing, and physical therapy treatment including HEP instruction, education, aerobic activity, dynamic strengthening on MedX equipment for the spine, and whole body strengthening on MedX equipment with increasing resistance. Patient demonstrates increased ability to reduce symptoms, improve posture, improve ROM, and improve  strength, as stated below:    -Improved posture, she is using lumbar roll  -Improved lumbar ROM, 0-36 degrees initially on MedX test and 0-48 degrees currently.  -Improved strength at each test point on lumbar MedX isometric test with 26% average improvement noted with reduced pain noted by patient.  -Initial outcome tool score of 14% and current outcome tool score 12% indicating reduced pain and improved function.        Patient is making good progress towards established goals.  Pt will continue to benefit from skilled outpatient physical therapy to address the deficits stated in the impairment chart, provide pt/family education and to maximize pt's level of independence in the home and community environment.     Anticipated Barriers for therapy: none  Pt's spiritual, cultural and educational needs considered and pt agreeable to plan of care and goals as stated below:     Goals:      Short term goals:  6 weeks or 10 visits   - Pt will demonstrate increased lumbar ROM by at least 3 degrees from the initial ROM value with improvements noted in functional ROM and ability to perform ADLs. Appropriate and Ongoing Met 9/25/23  - Pt will demonstrate increased MedX average isometric strength value by 20% from initial test resulting in improved ability to perform bending, lifting, and carrying activities safely, confidently. Appropriate and Ongoing Met 9/25/23  - Pt will report a reduction in worst pain score by 1-2 points for improved tolerance for sitting, bending, lifting. Appropriate and Ongoing Not met  - Pt able to perform HEP correctly with minimal cueing or supervision from therapist to encourage independent management of symptoms. Appropriate and Ongoing Met 9/25/23     Long term goals: 10 weeks or 20 visits   - Pt will demonstrate increased lumbar ROM by at least 6 degrees from initial ROM value, resulting in improved ability to perform functional forward bending while standing and sitting. Appropriate and  Ongoing Met 9/25/23  - Pt will demonstrate increased MedX average isometric strength value by 30% from initial test resulting in improved ability to perform bending, lifting, and carrying activities safely and confidently. Appropriate and Ongoing Progressing  - Pt to demonstrate ability to independently control and reduce their pain through posture positioning and mechanical movements throughout a typical day. Appropriate and Ongoing Not met  - Pt will demonstrate reduced pain and improved functional outcomes as reported on the FOTO by reaching an intake score of >/= 10% functional ability in order to demonstrate subjective improvement in patient's condition. . Appropriate and Ongoing Progressing  - Pt will demonstrate independence with the HEP at discharge. Appropriate and Ongoing  - Pt will be able to reduce pain and prevent recurring episodes of pain(patient goal) Appropriate and Ongoing      Plan     Continue with established Plan of Care towards established PT goals.     Therapist: Rema Pierre, PT  9/27/2023

## 2023-10-02 ENCOUNTER — CLINICAL SUPPORT (OUTPATIENT)
Dept: REHABILITATION | Facility: HOSPITAL | Age: 68
End: 2023-10-02
Payer: MEDICARE

## 2023-10-02 DIAGNOSIS — M54.50 CHRONIC MIDLINE LOW BACK PAIN WITHOUT SCIATICA: Primary | ICD-10-CM

## 2023-10-02 DIAGNOSIS — M25.69 DECREASED ROM OF TRUNK AND BACK: ICD-10-CM

## 2023-10-02 DIAGNOSIS — G89.29 CHRONIC MIDLINE LOW BACK PAIN WITHOUT SCIATICA: Primary | ICD-10-CM

## 2023-10-02 PROCEDURE — 97110 THERAPEUTIC EXERCISES: CPT | Mod: PO | Performed by: PHYSICAL THERAPIST

## 2023-10-02 PROCEDURE — 97112 NEUROMUSCULAR REEDUCATION: CPT | Mod: PO | Performed by: PHYSICAL THERAPIST

## 2023-10-02 NOTE — PROGRESS NOTES
[facility]  Physical Therapy Treatment Note   0  Name: Tamica Cardona  Clinic Number: [id number]    Therapy Diagnosis:   Encounter Diagnoses   Name Primary?    Chronic midline low back pain without sciatica Yes    Decreased ROM of trunk and back          Physician: Leeanna Desai PA-C    Visit Date: 10/2/2023    Physician Orders: PT Eval and Treat- [facility]  Medical Diagnosis from Referral: Chronic midline low back pain without sciatica, Scoliosis of thoracolumbar spine  Evaluation Date: 2023  Authorization Period Expiration: 24  Plan of Care Expiration: 10/20/2023  Reassessment: 23  Reassessment Due: visit 20  Visit # / Visits authorized:  (+1 prior auth)    PTA Visit #: 0/5     Time In: 2:30PM  Time Out: 3:15PM  Total Billable Time: 50 minutes  INSURANCE and OUTCOMES: Fee for Service with FOTO Outcomes 1/3    Precautions: standard    Pattern of pain determined: Pattern 1 PEP , switch to pattern 2 23    Subjective     Tamica Cardona reports some decrease in pain since beginning flexion biased exercises and holding on extension. She has also been paying more attention to how she bends and lifts.  Patient reports tolerating previous visit   Patient reports their pain to be 2/10 on a 0-10 scale with 0 being no pain and 10 being the worst pain imaginable.  Pain Location: bilateral back , worse on right      Work and leisure: Occupation: retired secretarial/accounting work  Leisure: walk, dance, play with grandchildren, travel, reading, gardening     Pt goals:  prevent episodes of pain, reduce pain     Objective      Baseline IM Testing Results:   Date of testin23  ROM 0-48 deg 0-36 deg   Max Peak Torque 118 100    Min Peak Torque 101 68    Flex/Ext Ratio 1.2:1 1.5:1   % below normative data  0   % above normative data 21        Outcomes:  Limitation Score: 14%  Visit 5 Score: 14%  Visit 10  Score: 12%  Discharge Score:     Treatment     Tamica received the treatments listed below:        Tamica received neuromuscular education for 15 minutes via participation on the [employer] Machine. Therapist assisted patient in isolating and engaging spinal stabilization musculature in order to improve functional ability and postural control. Patient performed exercise with therapist guidance in order to accurately use pacer function, avoid valsalva, and optimally exert effort within a safe and effective range via the Idris Exertion Rating Scale. Patient instructed to perform at a midrange of exertion and to complete 15-20 repetitions within appropriate split time, with proper technique, and while maintaining safety.     Lumbar stabilization march in place x20       Tamica participated in therapeutic exercises to develop strength, endurance, ROM, flexibility, and posture for 30 minutes including:    Bilateral KTC 10x 5 sec  LTR 10x 5 sec ea  PPT x20  Seated lumbar flexion 5x 5 sec      Hold:   Prone on elbows 2 min  Press ups 2/10  Bridging 2/10 (pre-set with arch into extension)  Standing extension x10         10/2/2023     2:53 PM   [facility]   Visit Number 12   VAS Pain Rating 2   Treadmill Time (in min.) 10 min   Speed 2.8 mph   Flexion in Lying 10   Flexion in Sitting 5   Lumbar Weight 65 lbs   Repetitions 20   Rating of Perceived Exertion 4              Peripheral muscle strengthening which included one set of 15-20 repetitions at a slow and controlled 10-13 second per rep pace focused on strengthening supporting musculature in order to improve body mechanics and functional mobility. Patient and therapist focused on proper form during treatment to ensure optimal strengthening of each targeted muscle group.  Machines utilized include torso rotation, leg extension, leg curl, chest press, upright row. Tricep extension, bicep curl, leg press, and hip abduction added visit 3    Tamica participated in  dynamic functional therapeutic activities to improve functional performance and simulate household and community activities for 00  minutes. The following activities were included:        Tamica received manual therapy techniques for 00  minutes. The following activities were included:        Pt given cold pack for 00 minutes to low back in Z-lie.    Patient Education and Home Exercises     Home exercises include:  KTC  LTR  PPT  Seated flexion    Hold x2 days:  Prone on elbows  Press ups  Bridging  Standing extension    Cardio program (V5): -9/5/23  Lifting education (V11): - 9/27/23  Posture/Lumbar roll: instructed visit 1  [employer] Discharge handout (date given): -  Equipment at home/gym membership:     Education provided:   - PT role and POC  - HEP  Sitting posture, daily stretching, use of lumbar roll, frequent extension throughout day    Written Home Exercises Provided: Patient instructed to cont prior HEP.  Exercises were reviewed and Tamica was able to demonstrate them prior to the end of the session.  Tamica demonstrated good  understanding of the education provided.     See EMR under Patient Instructions for exercises provided prior visit.    Assessment     Added lumbar stabilization with march in place to HEP. Instructed in purpose of and how to stabilize lumbar spine better. Discussed bending and lifting with review of bending with light and frequent activities. Progressed with resistance on MEDX and torso rotation with appropriate perceived exertion.    Reassessment 9/25/23:  Patient has attended 10 visits at [facility] which included MD evaluation, PT evaluation with isometric testing, and physical therapy treatment including HEP instruction, education, aerobic activity, dynamic strengthening on MedX equipment for the spine, and whole body strengthening on MedX equipment with increasing resistance. Patient demonstrates increased ability to reduce symptoms, improve posture, improve ROM,  and improve strength, as stated below:    -Improved posture, she is using lumbar roll  -Improved lumbar ROM, 0-36 degrees initially on MedX test and 0-48 degrees currently.  -Improved strength at each test point on lumbar MedX isometric test with 26% average improvement noted with reduced pain noted by patient.  -Initial outcome tool score of 14% and current outcome tool score 12% indicating reduced pain and improved function.        Patient is making good progress towards established goals.  Pt will continue to benefit from skilled outpatient physical therapy to address the deficits stated in the impairment chart, provide pt/family education and to maximize pt's level of independence in the home and community environment.     Anticipated Barriers for therapy: none  Pt's spiritual, cultural and educational needs considered and pt agreeable to plan of care and goals as stated below:     Goals:      Short term goals:  6 weeks or 10 visits   - Pt will demonstrate increased lumbar ROM by at least 3 degrees from the initial ROM value with improvements noted in functional ROM and ability to perform ADLs. Appropriate and Ongoing Met 9/25/23  - Pt will demonstrate increased MedX average isometric strength value by 20% from initial test resulting in improved ability to perform bending, lifting, and carrying activities safely, confidently. Appropriate and Ongoing Met 9/25/23  - Pt will report a reduction in worst pain score by 1-2 points for improved tolerance for sitting, bending, lifting. Appropriate and Ongoing Not met  - Pt able to perform HEP correctly with minimal cueing or supervision from therapist to encourage independent management of symptoms. Appropriate and Ongoing Met 9/25/23     Long term goals: 10 weeks or 20 visits   - Pt will demonstrate increased lumbar ROM by at least 6 degrees from initial ROM value, resulting in improved ability to perform functional forward bending while standing and sitting.  Appropriate and Ongoing Met 9/25/23  - Pt will demonstrate increased MedX average isometric strength value by 30% from initial test resulting in improved ability to perform bending, lifting, and carrying activities safely and confidently. Appropriate and Ongoing Progressing  - Pt to demonstrate ability to independently control and reduce their pain through posture positioning and mechanical movements throughout a typical day. Appropriate and Ongoing Not met  - Pt will demonstrate reduced pain and improved functional outcomes as reported on the FOTO by reaching an intake score of >/= 10% functional ability in order to demonstrate subjective improvement in patient's condition. . Appropriate and Ongoing Progressing  - Pt will demonstrate independence with the HEP at discharge. Appropriate and Ongoing  - Pt will be able to reduce pain and prevent recurring episodes of pain(patient goal) Appropriate and Ongoing      Plan     Continue with established Plan of Care towards established PT goals.     Therapist: Rema Pierre, PT  10/2/2023

## 2023-10-04 ENCOUNTER — CLINICAL SUPPORT (OUTPATIENT)
Dept: REHABILITATION | Facility: HOSPITAL | Age: 68
End: 2023-10-04
Payer: MEDICARE

## 2023-10-04 DIAGNOSIS — M54.50 CHRONIC MIDLINE LOW BACK PAIN WITHOUT SCIATICA: Primary | ICD-10-CM

## 2023-10-04 DIAGNOSIS — G89.29 CHRONIC MIDLINE LOW BACK PAIN WITHOUT SCIATICA: Primary | ICD-10-CM

## 2023-10-04 DIAGNOSIS — M25.69 DECREASED ROM OF TRUNK AND BACK: ICD-10-CM

## 2023-10-04 PROCEDURE — 97110 THERAPEUTIC EXERCISES: CPT | Mod: PO | Performed by: PHYSICAL THERAPIST

## 2023-10-04 PROCEDURE — 97112 NEUROMUSCULAR REEDUCATION: CPT | Mod: PO | Performed by: PHYSICAL THERAPIST

## 2023-10-04 NOTE — PROGRESS NOTES
[facility]  Physical Therapy Treatment Note   0  Name: Tamica Cardona  Clinic Number: [id number]    Therapy Diagnosis:   Encounter Diagnoses   Name Primary?    Chronic midline low back pain without sciatica Yes    Decreased ROM of trunk and back          Physician: Leeanna Desai PA-C    Visit Date: 10/4/2023    Physician Orders: PT Eval and Treat- Healthy [facility]  Medical Diagnosis from Referral: Chronic midline low back pain without sciatica, Scoliosis of thoracolumbar spine  Evaluation Date: 2023  Authorization Period Expiration: 24  Plan of Care Expiration: 10/20/2023  Reassessment: 23  Reassessment Due: visit 20  Visit # / Visits authorized:  (+1 prior auth)    PTA Visit #: 0/5     Time In: 10:45AM  Time Out: 11:40AM  Total Billable Time: 45 minutes  INSURANCE and OUTCOMES: Fee for Service with FOTO Outcomes 1/3    Precautions: standard    Pattern of pain determined: Pattern 1 PEP , switch to pattern 2 23    Subjective     Tamica Cardona reports she went to bed last night with increased low back pain and awakened this morning with increased pain. She could not ease pain with stretching.  Patient reports tolerating previous visit   Patient reports their pain to be 4/10 on a 0-10 scale with 0 being no pain and 10 being the worst pain imaginable.  Pain Location: bilateral back , worse on right      Work and leisure: Occupation: retired secretarial/accounting work  Leisure: walk, dance, play with grandchildren, travel, reading, gardening     Pt goals:  prevent episodes of pain, reduce pain     Objective      Baseline IM Testing Results:   Date of testin23  ROM 0-48 deg 0-36 deg   Max Peak Torque 118 100    Min Peak Torque 101 68    Flex/Ext Ratio 1.2:1 1.5:1   % below normative data  0   % above normative data 21        Outcomes:  Limitation Score: 14%  Visit 5 Score: 14%  Visit 10 Score:  12%  Discharge Score:     Treatment     Tamica received the treatments listed below:        Tamica received neuromuscular education for 00 minutes via participation on the CelluComp Machine. Therapist assisted patient in isolating and engaging spinal stabilization musculature in order to improve functional ability and postural control. Patient performed exercise with therapist guidance in order to accurately use pacer function, avoid valsalva, and optimally exert effort within a safe and effective range via the Idris Exertion Rating Scale. Patient instructed to perform at a midrange of exertion and to complete 15-20 repetitions within appropriate split time, with proper technique, and while maintaining safety.   Other neuromuscular activities for lumbar stabilization and posture 15 minutes:  Lumbar stabilization march in place x20  Bilateral KTC 10x 5 sec  LTR 10x 5 sec ea  PPT x20  Seated lumbar flexion 10x 5 sec (before and after extension)  Prone press up 2/10 (worse afterwards)     Tamica participated in therapeutic exercises to develop strength, endurance, ROM, flexibility, and posture for 30 minutes including:  Treadmill 10 min x2      Hold:   Prone on elbows 2 min  Press ups 2/10  Bridging 2/10 (pre-set with arch into extension)  Standing extension x10         10/4/2023    11:32 AM   [facility]   Visit Number 13   VAS Pain Rating 4   Treadmill Time (in min.) 20 min   Speed 2.8 mph   Extension in Lying 20   Flexion in Lying 10   Flexion in Sitting 20   Ice - Z Lie (in min.) 10                  Peripheral muscle strengthening which included one set of 15-20 repetitions at a slow and controlled 10-13 second per rep pace focused on strengthening supporting musculature in order to improve body mechanics and functional mobility. Patient and therapist focused on proper form during treatment to ensure optimal strengthening of each targeted muscle group.  Machines utilized include torso rotation, leg  extension, leg curl, chest press, upright row. Tricep extension, bicep curl, leg press, and hip abduction added visit 3    Tamica participated in dynamic functional therapeutic activities to improve functional performance and simulate household and community activities for 00  minutes. The following activities were included:        Tamica received manual therapy techniques for 00  minutes. The following activities were included:        Pt given cold pack for 10 minutes to low back in Z-lie.    Patient Education and Home Exercises     Home exercises include:  KTC  LTR  PPT  Lumbar stabilization- march in place  Seated flexion    Hold :  Prone on elbows  Press ups  Bridging  Standing extension    Cardio program (V5): -9/5/23  Lifting education (V11): - 9/27/23  Posture/Lumbar roll: instructed visit 1  Fr[employer] Discharge handout (date given): -  Equipment at home/gym membership:     Education provided:   - PT role and POC  - HEP  Sitting posture, daily stretching, use of lumbar roll, frequent extension throughout day    Written Home Exercises Provided: Patient instructed to cont prior HEP.  Exercises were reviewed and Tamica was able to demonstrate them prior to the end of the session.  Tamica demonstrated good  understanding of the education provided.     See EMR under Patient Instructions for exercises provided prior visit.    Assessment     Initially pt was not aware of reasons for increased pain, but after further discussion she realized she had 45 min car ride each way and several hours of sitting yesterday without lumbar support. Pain improved with walking today, but increased back to previous level with flexion exercises. Tried extension, but pain increased again. Reduced to baseline with flexion stretch. Followed with walking again which helped and ice.    Reassessment 9/25/23:  Patient has attended 10 visits at [facility] which included MD evaluation, PT evaluation with isometric testing, and  physical therapy treatment including HEP instruction, education, aerobic activity, dynamic strengthening on MedX equipment for the spine, and whole body strengthening on MedX equipment with increasing resistance. Patient demonstrates increased ability to reduce symptoms, improve posture, improve ROM, and improve strength, as stated below:    -Improved posture, she is using lumbar roll  -Improved lumbar ROM, 0-36 degrees initially on MedX test and 0-48 degrees currently.  -Improved strength at each test point on lumbar MedX isometric test with 26% average improvement noted with reduced pain noted by patient.  -Initial outcome tool score of 14% and current outcome tool score 12% indicating reduced pain and improved function.        Patient is making good progress towards established goals.  Pt will continue to benefit from skilled outpatient physical therapy to address the deficits stated in the impairment chart, provide pt/family education and to maximize pt's level of independence in the home and community environment.     Anticipated Barriers for therapy: none  Pt's spiritual, cultural and educational needs considered and pt agreeable to plan of care and goals as stated below:     Goals:      Short term goals:  6 weeks or 10 visits   - Pt will demonstrate increased lumbar ROM by at least 3 degrees from the initial ROM value with improvements noted in functional ROM and ability to perform ADLs. Appropriate and Ongoing Met 9/25/23  - Pt will demonstrate increased MedX average isometric strength value by 20% from initial test resulting in improved ability to perform bending, lifting, and carrying activities safely, confidently. Appropriate and Ongoing Met 9/25/23  - Pt will report a reduction in worst pain score by 1-2 points for improved tolerance for sitting, bending, lifting. Appropriate and Ongoing Not met  - Pt able to perform HEP correctly with minimal cueing or supervision from therapist to encourage  independent management of symptoms. Appropriate and Ongoing Met 9/25/23     Long term goals: 10 weeks or 20 visits   - Pt will demonstrate increased lumbar ROM by at least 6 degrees from initial ROM value, resulting in improved ability to perform functional forward bending while standing and sitting. Appropriate and Ongoing Met 9/25/23  - Pt will demonstrate increased MedX average isometric strength value by 30% from initial test resulting in improved ability to perform bending, lifting, and carrying activities safely and confidently. Appropriate and Ongoing Progressing  - Pt to demonstrate ability to independently control and reduce their pain through posture positioning and mechanical movements throughout a typical day. Appropriate and Ongoing Not met  - Pt will demonstrate reduced pain and improved functional outcomes as reported on the FOTO by reaching an intake score of >/= 10% functional ability in order to demonstrate subjective improvement in patient's condition. . Appropriate and Ongoing Progressing  - Pt will demonstrate independence with the HEP at discharge. Appropriate and Ongoing  - Pt will be able to reduce pain and prevent recurring episodes of pain(patient goal) Appropriate and Ongoing      Plan     Continue with established Plan of Care towards established PT goals.     Therapist: Rema Pierre, PT  10/4/2023

## 2023-10-21 ENCOUNTER — CLINICAL SUPPORT (OUTPATIENT)
Dept: CARDIOLOGY | Facility: HOSPITAL | Age: 68
End: 2023-10-21
Attending: INTERNAL MEDICINE
Payer: MEDICARE

## 2023-10-21 ENCOUNTER — CLINICAL SUPPORT (OUTPATIENT)
Dept: CARDIOLOGY | Facility: HOSPITAL | Age: 68
End: 2023-10-21
Payer: MEDICARE

## 2023-10-21 DIAGNOSIS — I49.8 OTHER SPECIFIED CARDIAC ARRHYTHMIAS: ICD-10-CM

## 2023-10-23 RX ORDER — ROSUVASTATIN CALCIUM 10 MG/1
10 TABLET, COATED ORAL
Qty: 90 TABLET | Refills: 0 | Status: SHIPPED | OUTPATIENT
Start: 2023-10-23 | End: 2023-11-01 | Stop reason: SDUPTHER

## 2023-10-23 NOTE — TELEPHONE ENCOUNTER
Please advise  LOV:7/28/23  Nxt Apt: 11/1/23  Last Fill:10/12/23----90---3    
fam hx of son w covid  denies smoking, etoh hx  ambulates occasionally w walker or cane

## 2023-10-25 ENCOUNTER — CLINICAL SUPPORT (OUTPATIENT)
Dept: CARDIOLOGY | Facility: HOSPITAL | Age: 68
End: 2023-10-25
Payer: MEDICARE

## 2023-10-25 DIAGNOSIS — Z95.818 PRESENCE OF OTHER CARDIAC IMPLANTS AND GRAFTS: ICD-10-CM

## 2023-10-26 ENCOUNTER — CLINICAL SUPPORT (OUTPATIENT)
Dept: CARDIOLOGY | Facility: HOSPITAL | Age: 68
End: 2023-10-26
Payer: MEDICARE

## 2023-10-26 ENCOUNTER — PATIENT MESSAGE (OUTPATIENT)
Dept: CARDIOLOGY | Facility: CLINIC | Age: 68
End: 2023-10-26
Payer: MEDICARE

## 2023-10-26 ENCOUNTER — CLINICAL SUPPORT (OUTPATIENT)
Dept: CARDIOLOGY | Facility: HOSPITAL | Age: 68
End: 2023-10-26
Attending: INTERNAL MEDICINE
Payer: MEDICARE

## 2023-10-26 DIAGNOSIS — I49.8 OTHER SPECIFIED CARDIAC ARRHYTHMIAS: ICD-10-CM

## 2023-10-30 ENCOUNTER — DOCUMENTATION ONLY (OUTPATIENT)
Dept: REHABILITATION | Facility: HOSPITAL | Age: 68
End: 2023-10-30
Payer: MEDICARE

## 2023-10-30 DIAGNOSIS — G89.29 CHRONIC MIDLINE LOW BACK PAIN WITHOUT SCIATICA: Primary | ICD-10-CM

## 2023-10-30 DIAGNOSIS — M25.69 DECREASED ROM OF TRUNK AND BACK: ICD-10-CM

## 2023-10-30 DIAGNOSIS — M54.50 CHRONIC MIDLINE LOW BACK PAIN WITHOUT SCIATICA: Primary | ICD-10-CM

## 2023-10-30 NOTE — PROGRESS NOTES
[facility]   Discharge Note    Name: Tamica Cardona  Clinic Number: [id number]    Therapy Diagnosis:   Encounter Diagnoses   Name Primary?    Chronic midline low back pain without sciatica Yes    Decreased ROM of trunk and back      Physician: Leeanna Desai PA-C        Physician Orders: PT Eval and Treat- Healthy Back  Medical Diagnosis from Referral: Chronic midline low back pain without sciatica, Scoliosis of thoracolumbar spine  Evaluation Date: 8/11/2023      Date of Last visit: 10/4/23  Total Visits Received: 13    ASSESSMENT         Reassessment 9/25/23:  Patient has attended 10 visits at [facility] which included MD evaluation, PT evaluation with isometric testing, and physical therapy treatment including HEP instruction, education, aerobic activity, dynamic strengthening on MedX equipment for the spine, and whole body strengthening on MedX equipment with increasing resistance. Patient demonstrates increased ability to reduce symptoms, improve posture, improve ROM, and improve strength, as stated below:     -Improved posture, she is using lumbar roll  -Improved lumbar ROM, 0-36 degrees initially on MedX test and 0-48 degrees currently.  -Improved strength at each test point on lumbar MedX isometric test with 26% average improvement noted with reduced pain noted by patient.  -Initial outcome tool score of 14% and current outcome tool score 12% indicating reduced pain and improved function.    Discharge reason: Patient has not attended therapy since 10/4/23    Discharge FOTO Score: 12%    Goals:   Short term goals:  6 weeks or 10 visits   - Pt will demonstrate increased lumbar ROM by at least 3 degrees from the initial ROM value with improvements noted in functional ROM and ability to perform ADLs. Appropriate and Ongoing Met 9/25/23  - Pt will demonstrate increased MedX average isometric strength value by 20% from initial test resulting in improved ability to perform  bending, lifting, and carrying activities safely, confidently. Appropriate and Ongoing Met 9/25/23  - Pt will report a reduction in worst pain score by 1-2 points for improved tolerance for sitting, bending, lifting. Appropriate and Ongoing Not met  - Pt able to perform HEP correctly with minimal cueing or supervision from therapist to encourage independent management of symptoms. Appropriate and Ongoing Met 9/25/23     Long term goals: 10 weeks or 20 visits   - Pt will demonstrate increased lumbar ROM by at least 6 degrees from initial ROM value, resulting in improved ability to perform functional forward bending while standing and sitting. Appropriate and Ongoing Met 9/25/23  - Pt will demonstrate increased MedX average isometric strength value by 30% from initial test resulting in improved ability to perform bending, lifting, and carrying activities safely and confidently. Appropriate and Ongoing Progressing  - Pt to demonstrate ability to independently control and reduce their pain through posture positioning and mechanical movements throughout a typical day. Appropriate and Ongoing Not met  - Pt will demonstrate reduced pain and improved functional outcomes as reported on the FOTO by reaching an intake score of >/= 10% functional ability in order to demonstrate subjective improvement in patient's condition. . Appropriate and Ongoing Progressing  - Pt will demonstrate independence with the HEP at discharge. Appropriate and Ongoing  - Pt will be able to reduce pain and prevent recurring episodes of pain(patient goal) Appropriate and Ongoing    PLAN   This patient is discharged from Physical Therapy      Rema Pierre PT

## 2023-11-01 ENCOUNTER — OFFICE VISIT (OUTPATIENT)
Dept: FAMILY MEDICINE | Facility: CLINIC | Age: 68
End: 2023-11-01
Payer: MEDICARE

## 2023-11-01 ENCOUNTER — TELEPHONE (OUTPATIENT)
Dept: FAMILY MEDICINE | Facility: CLINIC | Age: 68
End: 2023-11-01

## 2023-11-01 VITALS
DIASTOLIC BLOOD PRESSURE: 70 MMHG | SYSTOLIC BLOOD PRESSURE: 130 MMHG | TEMPERATURE: 98 F | BODY MASS INDEX: 24.96 KG/M2 | HEIGHT: 65 IN | OXYGEN SATURATION: 96 % | WEIGHT: 149.81 LBS | HEART RATE: 76 BPM

## 2023-11-01 DIAGNOSIS — J30.9 ALLERGIC RHINITIS, UNSPECIFIED SEASONALITY, UNSPECIFIED TRIGGER: ICD-10-CM

## 2023-11-01 DIAGNOSIS — I48.0 PAROXYSMAL ATRIAL FIBRILLATION: Primary | ICD-10-CM

## 2023-11-01 DIAGNOSIS — F32.A DEPRESSION, UNSPECIFIED DEPRESSION TYPE: ICD-10-CM

## 2023-11-01 DIAGNOSIS — N63.23 MASS OF LOWER OUTER QUADRANT OF LEFT BREAST: ICD-10-CM

## 2023-11-01 DIAGNOSIS — E78.2 MIXED HYPERLIPIDEMIA: ICD-10-CM

## 2023-11-01 DIAGNOSIS — R25.1 TREMOR: ICD-10-CM

## 2023-11-01 DIAGNOSIS — G25.0 BENIGN FAMILIAL TREMOR: ICD-10-CM

## 2023-11-01 DIAGNOSIS — D50.8 OTHER IRON DEFICIENCY ANEMIA: ICD-10-CM

## 2023-11-01 DIAGNOSIS — F41.9 ANXIETY DISORDER, UNSPECIFIED TYPE: ICD-10-CM

## 2023-11-01 PROCEDURE — G0008 FLU VACCINE - QUADRIVALENT - ADJUVANTED: ICD-10-PCS | Mod: S$GLB,,, | Performed by: NURSE PRACTITIONER

## 2023-11-01 PROCEDURE — 90694 FLU VACCINE - QUADRIVALENT - ADJUVANTED: ICD-10-PCS | Mod: S$GLB,,, | Performed by: NURSE PRACTITIONER

## 2023-11-01 PROCEDURE — G0008 ADMIN INFLUENZA VIRUS VAC: HCPCS | Mod: S$GLB,,, | Performed by: NURSE PRACTITIONER

## 2023-11-01 PROCEDURE — 99214 PR OFFICE/OUTPT VISIT, EST, LEVL IV, 30-39 MIN: ICD-10-PCS | Mod: S$GLB,,, | Performed by: NURSE PRACTITIONER

## 2023-11-01 PROCEDURE — 90694 VACC AIIV4 NO PRSRV 0.5ML IM: CPT | Mod: S$GLB,,, | Performed by: NURSE PRACTITIONER

## 2023-11-01 PROCEDURE — 99214 OFFICE O/P EST MOD 30 MIN: CPT | Mod: S$GLB,,, | Performed by: NURSE PRACTITIONER

## 2023-11-01 RX ORDER — ESCITALOPRAM OXALATE 10 MG/1
15 TABLET ORAL DAILY
Qty: 180 TABLET | Refills: 3 | Status: SHIPPED | OUTPATIENT
Start: 2023-11-01

## 2023-11-01 RX ORDER — ROSUVASTATIN CALCIUM 10 MG/1
10 TABLET, COATED ORAL DAILY
Qty: 90 TABLET | Refills: 3 | Status: SHIPPED | OUTPATIENT
Start: 2023-11-01 | End: 2024-02-20 | Stop reason: SDUPTHER

## 2023-11-01 RX ORDER — CLONAZEPAM 0.5 MG/1
TABLET ORAL
Qty: 30 TABLET | Refills: 0 | Status: SHIPPED | OUTPATIENT
Start: 2023-11-01

## 2023-11-01 NOTE — PROGRESS NOTES
Subjective:       Patient ID: Tamica Cardona is a 67 y.o. female.    Chief Complaint: Follow-up    Follow-up  Associated symptoms include arthralgias, congestion and headaches (sinus headache today). Pertinent negatives include no abdominal pain, chest pain, coughing, fatigue, fever, nausea or vomiting.     Tamica Cardona is a 67 y.o. year old female patient who has a past medical history of Allergy, Anxiety, Atrial fibrillation, Sleep apnea with Cpap, Breast cancer, right sided mastectomy, Depression, History of UTI, Interstitial cystitis, KEMAL, long term use anticoagulants and Neutropenia.    Still having trouble with her back. Went through healthy back program that didn't help.      Left sided sinus headache today. No medications other than regular allergy medications. Flonase helps. No sinus pain or discolored mucous.     Will give flu vaccine today. She will get RSV at the pharmacy.     Multiple chronic issues to review. See ROS/assessment and plan      The following portion of the patients history was reviewed and updated as appropriate: allergies, current medications, past medical and surgical history. Past social history and problem list reviewed. Family PMH and Past social history reviewed. Tobacco, Illicit drug use reviewed.      Review of patient's allergies indicates:   Allergen Reactions    Thimerosal Swelling    Erythromycin base      Other reaction(s): Vomiting  Other reaction(s): Stomach upset  Other reaction(s): Nausea    Penicillins      Other reaction(s): convulsions         Current Outpatient Medications:     acetaminophen (TYLENOL) 500 MG tablet, Take 750 mg by mouth every 6 (six) hours as needed for Pain., Disp: , Rfl:     apixaban (ELIQUIS) 5 mg Tab, Take 1 tablet (5 mg total) by mouth 2 (two) times daily., Disp: 60 tablet, Rfl: 8    ascorbic acid, vitamin C, (VITAMIN C) 500 MG tablet, Take 500 mg by mouth once daily., Disp: , Rfl:     calcium carbonate (OS-DESTINY) 600 mg (1,500 mg) Tab,  Take 600 mg by mouth 2 (two) times daily with meals., Disp: , Rfl:     EScitalopram oxalate (LEXAPRO) 10 MG tablet, Take 1.5 tablets (15 mg total) by mouth once daily., Disp: 180 tablet, Rfl: 2    ferrous gluconate (FERGON) 325 MG tablet, Take 45 mg by mouth daily with breakfast., Disp: , Rfl:     flecainide (TAMBOCOR) 100 MG Tab, Take 1 tablet (100 mg total) by mouth every 12 (twelve) hours as needed., Disp: 60 tablet, Rfl: 11    fluticasone propionate (FLONASE) 50 mcg/actuation nasal spray, , Disp: , Rfl:     loratadine (CLARITIN) 10 mg tablet, Take 10 mg by mouth daily as needed. , Disp: , Rfl:     meclizine (ANTIVERT) 25 mg tablet, Take 1 tablet (25 mg total) by mouth 3 (three) times daily as needed., Disp: 30 tablet, Rfl: 1    metoprolol succinate (TOPROL-XL) 25 MG 24 hr tablet, Take one tablet by mouth daily, Disp: 90 tablet, Rfl: 1    rosuvastatin (CRESTOR) 10 MG tablet, Take one tablet by mouth daily, Disp: 90 tablet, Rfl: 0    vitamin D (VITAMIN D3) 1000 units Tab, Take 1,000 Units by mouth once daily., Disp: , Rfl:     omega 3-dha-epa-fish oil 500-100-1,000 mg Cap, Take 1 capsule by mouth once daily. , Disp: , Rfl:     Past Medical History:   Diagnosis Date    Allergy     Anxiety     Atrial fibrillation 10/12/2020    Breast cancer 11/2003    right    Depression     General anesthetics causing adverse effect in therapeutic use     slow to wake-- easily sedated    History of UTI     Interstitial cystitis 2003    Neutropenia        Past Surgical History:   Procedure Laterality Date    BREAST BIOPSY Left 2006    benign    CYSTOSCOPY  2003    EYE SURGERY  01/2015    HYSTERECTOMY      TVH, ovaries intact. Uterine prolapse    INSERTION OF IMPLANTABLE LOOP RECORDER Left 02/08/2021    Procedure: Insertion, Implantable Loop Recorder;  Surgeon: Collin Roberts MD;  Location: [facility];  Service: Cardiology;  Laterality: Left;    KNEE ARTHROSCOPY W/ MENISCECTOMY  1992    LT    left breast biopsy  2006    benign     MASTECTOMY, RADICAL Right 12/2003    RETINAL DETACHMENT SURGERY Right        Social History     Socioeconomic History    Marital status:    Tobacco Use    Smoking status: Never    Smokeless tobacco: Never   Substance and Sexual Activity    Alcohol use: No    Drug use: No     Social Determinants of Health     Financial Resource Strain: Low Risk  (7/26/2023)    Overall Financial Resource Strain (CARDIA)     Difficulty of Paying Living Expenses: Not hard at all   Food Insecurity: No Food Insecurity (7/26/2023)    Hunger Vital Sign     Worried About Running Out of Food in the Last Year: Never true     Ran Out of Food in the Last Year: Never true   Transportation Needs: No Transportation Needs (7/26/2023)    PRAPARE - Transportation     Lack of Transportation (Medical): No     Lack of Transportation (Non-Medical): No   Physical Activity: Insufficiently Active (7/26/2023)    Exercise Vital Sign     Days of Exercise per Week: 2 days     Minutes of Exercise per Session: 40 min   Stress: No Stress Concern Present (7/26/2023)    [employer] [place] [employer] - Occupational Stress Questionnaire     Feeling of Stress : Only a little   Social Connections: Unknown (7/26/2023)    Social Connection and Isolation Panel [NHANES]     Frequency of Communication with Friends and Family: More than three times a week     Frequency of Social Gatherings with Friends and Family: More than three times a week     Active Member of Clubs or Organizations: Yes     Attends Club or Organization Meetings: More than 4 times per year     Marital Status:    Housing Stability: Low Risk  (7/26/2023)    Housing Stability Vital Sign     Unable to Pay for Housing in the Last Year: No     Number of Places Lived in the Last Year: 1     Unstable Housing in the Last Year: No         Review of Systems   Constitutional:  Negative for fatigue and fever.   HENT:  Positive for congestion, rhinorrhea and sinus pressure.         Left side.  "Taking flonase. Advised can take the claritin and the flonase.    Respiratory:  Negative for cough, chest tightness, shortness of breath and wheezing.    Cardiovascular:  Negative for chest pain, palpitations and leg swelling.   Gastrointestinal:  Negative for abdominal pain, diarrhea, nausea and vomiting.   Genitourinary: Negative.    Musculoskeletal:  Positive for arthralgias and back pain (chronic).   Skin:         Left breast with more lumps and tenderness left outer area about 4 O'Clock area   Neurological:  Positive for tremors (left hand worse. right hand and now jaw) and headaches (sinus headache today).   Psychiatric/Behavioral:  Negative for dysphoric mood and sleep disturbance. The patient is not nervous/anxious.         Increased stress. Caring for 87 year old mother       Objective:      /70 (BP Location: Right arm, Patient Position: Sitting, BP Method: Medium (Manual))   Pulse 76   Temp 98.2 °F (36.8 °C)   Ht 5' 5" (1.651 m)   Wt 67.9 kg (149 lb 12.8 oz)   SpO2 96%   BMI 24.93 kg/m²      Physical Exam  Constitutional:       Appearance: Normal appearance. She is normal weight.   HENT:      Head: Normocephalic.      Right Ear: Tympanic membrane, ear canal and external ear normal.      Left Ear: Tympanic membrane, ear canal and external ear normal.      Nose: Rhinorrhea present. No congestion.      Right Sinus: No maxillary sinus tenderness.      Left Sinus: No maxillary sinus tenderness.      Mouth/Throat:      Pharynx: No posterior oropharyngeal erythema.   Neck:      Thyroid: No thyromegaly.      Vascular: No carotid bruit.   Cardiovascular:      Rate and Rhythm: Normal rate and regular rhythm.      Pulses: Normal pulses.      Heart sounds: Normal heart sounds. No murmur heard.  Pulmonary:      Effort: Pulmonary effort is normal.      Breath sounds: Normal breath sounds. No decreased breath sounds or wheezing.   Abdominal:      General: Bowel sounds are normal.      Tenderness: There is no " abdominal tenderness.   Musculoskeletal:      Cervical back: Normal range of motion.      Right lower leg: No edema.      Left lower leg: No edema.      Comments: Gait normal.  strong, equal   Skin:     General: Skin is warm and dry.      Capillary Refill: Capillary refill takes less than 2 seconds.   Neurological:      General: No focal deficit present.      Mental Status: She is alert.      Motor: Tremor (bilatreral hands and jaw tremors noted) present.   Psychiatric:         Attention and Perception: Attention and perception normal.         Mood and Affect: Mood and affect normal.         Speech: Speech normal.         Behavior: Behavior normal.         Assessment:       1. Paroxysmal atrial fibrillation    2. Tremor    3. Mass of lower outer quadrant of left breast    4. Anxiety disorder, unspecified type    5. Depression, unspecified depression type    6. Benign familial tremor    7. Mixed hyperlipidemia    8. Other iron deficiency anemia    9. Allergic rhinitis, unspecified seasonality, unspecified trigger        Plan:       Paroxysmal atrial fibrillation: on eliquis. Followed by Cardiology.     Tremor: progressing. Needs work up by Neurology.   -     Ambulatory referral/consult to Neurology; Future; Expected date: 11/08/2023    Mass of lower outer quadrant of left breast: due for diagnostic mammogram and US.  -     Mammo Digital Diagnostic Left with Jose Alfredo; Future; Expected date: 11/01/2023  -     US Breast Left Complete; Future; Expected date: 11/01/2023    Anxiety disorder, unspecified type: doing well on Lexapro. Will give klonopin for anxiety/panic feelings only for PRN use.  -     clonazePAM (KLONOPIN) 0.5 MG tablet; Take 1/2 to 1 tablet daily as needed for anxiety  Dispense: 30 tablet; Refill: 0    Depression, unspecified depression type: stable on Lexapro    Benign familial tremor: worsening. Needs Neurology follow up    Mixed hyperlipidemia: tolerating statin.     Lab Results   Component Value Date     LDLCALC 78.6 04/21/2023        Other iron deficiency anemia: on iron replacement therapy    Allergic rhinitis, unspecified seasonality, unspecified trigger: continue daily allergy medications. If not improving let me know. No indication that antibiotics are needed at this time. If symptoms continue for longer than a week or worsen, follow up.     Other orders  -     rosuvastatin (CRESTOR) 10 MG tablet; Take 1 tablet (10 mg total) by mouth once daily.  Dispense: 90 tablet; Refill: 3  -     EScitalopram oxalate (LEXAPRO) 10 MG tablet; Take 1.5 tablets (15 mg total) by mouth once daily.  Dispense: 180 tablet; Refill: 3  -     Influenza - Quadrivalent (Adjuvanted)       Continue current medication  Take medications only as prescribed  Healthy diet, exercise  Adequate rest  Adequate hydration  Avoid allergens  Avoid excessive caffeine      Follow up 6 months.

## 2023-11-05 ENCOUNTER — CLINICAL SUPPORT (OUTPATIENT)
Dept: CARDIOLOGY | Facility: HOSPITAL | Age: 68
End: 2023-11-05
Payer: MEDICARE

## 2023-11-05 ENCOUNTER — CLINICAL SUPPORT (OUTPATIENT)
Dept: CARDIOLOGY | Facility: HOSPITAL | Age: 68
End: 2023-11-05
Attending: INTERNAL MEDICINE
Payer: MEDICARE

## 2023-11-05 DIAGNOSIS — I49.8 OTHER SPECIFIED CARDIAC ARRHYTHMIAS: ICD-10-CM

## 2023-11-05 PROCEDURE — 93298 REM INTERROG DEV EVAL SCRMS: CPT | Mod: ,,, | Performed by: INTERNAL MEDICINE

## 2023-11-05 PROCEDURE — 93298 CARDIAC DEVICE CHECK CHECK - REMOTE ALERT: ICD-10-PCS | Mod: ,,, | Performed by: INTERNAL MEDICINE

## 2023-11-07 ENCOUNTER — HOSPITAL ENCOUNTER (OUTPATIENT)
Dept: RADIOLOGY | Facility: HOSPITAL | Age: 68
Discharge: HOME | End: 2023-11-07
Attending: NURSE PRACTITIONER
Payer: MEDICARE

## 2023-11-07 DIAGNOSIS — N63.23 MASS OF LOWER OUTER QUADRANT OF LEFT BREAST: ICD-10-CM

## 2023-11-07 PROCEDURE — 76642 US BREAST LEFT LIMITED: ICD-10-PCS | Mod: 26,LT,, | Performed by: RADIOLOGY

## 2023-11-07 PROCEDURE — 77061 BREAST TOMOSYNTHESIS UNI: CPT | Mod: 26,LT,, | Performed by: RADIOLOGY

## 2023-11-07 PROCEDURE — 77065 MAMMO DIGITAL DIAGNOSTIC LEFT WITH TOMO: ICD-10-PCS | Mod: 26,LT,, | Performed by: RADIOLOGY

## 2023-11-07 PROCEDURE — 77061 MAMMO DIGITAL DIAGNOSTIC LEFT WITH TOMO: ICD-10-PCS | Mod: 26,LT,, | Performed by: RADIOLOGY

## 2023-11-07 PROCEDURE — 77065 DX MAMMO INCL CAD UNI: CPT | Mod: TC,PO,LT

## 2023-11-07 PROCEDURE — 77065 DX MAMMO INCL CAD UNI: CPT | Mod: 26,LT,, | Performed by: RADIOLOGY

## 2023-11-07 PROCEDURE — 76642 ULTRASOUND BREAST LIMITED: CPT | Mod: 26,LT,, | Performed by: RADIOLOGY

## 2023-11-07 PROCEDURE — 76642 ULTRASOUND BREAST LIMITED: CPT | Mod: TC,PO,LT

## 2023-11-08 ENCOUNTER — PATIENT MESSAGE (OUTPATIENT)
Dept: FAMILY MEDICINE | Facility: CLINIC | Age: 68
End: 2023-11-08
Payer: MEDICARE

## 2023-11-13 LAB
OHS CV AF BURDEN PERCENT: 2.5
OHS CV AF BURDEN PERCENT: < 1
OHS CV AF BURDEN PERCENT: < 1
OHS CV DC REMOTE DEVICE TYPE: NORMAL
OHS CV ICD SHOCK: NO

## 2023-11-21 ENCOUNTER — CLINICAL SUPPORT (OUTPATIENT)
Dept: CARDIOLOGY | Facility: HOSPITAL | Age: 68
End: 2023-11-21
Payer: MEDICARE

## 2023-11-21 ENCOUNTER — TELEPHONE (OUTPATIENT)
Dept: CARDIOLOGY | Facility: HOSPITAL | Age: 68
End: 2023-11-21
Payer: MEDICARE

## 2023-11-21 ENCOUNTER — CLINICAL SUPPORT (OUTPATIENT)
Dept: CARDIOLOGY | Facility: HOSPITAL | Age: 68
End: 2023-11-21
Attending: INTERNAL MEDICINE
Payer: MEDICARE

## 2023-11-21 DIAGNOSIS — I49.8 OTHER SPECIFIED CARDIAC ARRHYTHMIAS: ICD-10-CM

## 2023-11-21 NOTE — TELEPHONE ENCOUNTER
AF with RVR noted during device remote check:  Patient states she is happy with the POC for now    Current burden:  1.3%    Max duration seen: 11/19 15:43pm 1hr 28mins, MVR 154bpm, patient states she was outside playing with grandson      11/18 18:37pm, 1hr 6mins, MVR 154bpm, patient ststes she was out of town and walking, increase in activity, did notice she was breathing a little heavy      Ventricular rates:     Needs improvement while in AF, patient states she is happy with the POC    Anticoagulation status:  Eliquis 5mg BID  Toprol XL 12.5 mg  Flecainide 100 mg bid as needed

## 2023-11-22 LAB
OHS CV AF BURDEN PERCENT: 1.2
OHS CV DC REMOTE DEVICE TYPE: NORMAL
OHS CV ICD SHOCK: NO

## 2023-12-04 ENCOUNTER — CLINICAL SUPPORT (OUTPATIENT)
Dept: CARDIOLOGY | Facility: HOSPITAL | Age: 68
End: 2023-12-04
Attending: INTERNAL MEDICINE
Payer: MEDICARE

## 2023-12-04 ENCOUNTER — CLINICAL SUPPORT (OUTPATIENT)
Dept: CARDIOLOGY | Facility: HOSPITAL | Age: 68
End: 2023-12-04
Payer: MEDICARE

## 2023-12-04 DIAGNOSIS — I49.8 OTHER SPECIFIED CARDIAC ARRHYTHMIAS: ICD-10-CM

## 2023-12-05 ENCOUNTER — CLINICAL SUPPORT (OUTPATIENT)
Dept: CARDIOLOGY | Facility: HOSPITAL | Age: 68
End: 2023-12-05
Payer: MEDICARE

## 2023-12-05 ENCOUNTER — CLINICAL SUPPORT (OUTPATIENT)
Dept: CARDIOLOGY | Facility: HOSPITAL | Age: 68
End: 2023-12-05
Attending: INTERNAL MEDICINE
Payer: MEDICARE

## 2023-12-05 DIAGNOSIS — I49.8 OTHER SPECIFIED CARDIAC ARRHYTHMIAS: ICD-10-CM

## 2023-12-06 ENCOUNTER — TELEPHONE (OUTPATIENT)
Dept: CARDIOLOGY | Facility: HOSPITAL | Age: 68
End: 2023-12-06
Payer: MEDICARE

## 2023-12-07 ENCOUNTER — TELEPHONE (OUTPATIENT)
Dept: CARDIOLOGY | Facility: HOSPITAL | Age: 68
End: 2023-12-07
Payer: MEDICARE

## 2023-12-07 ENCOUNTER — PATIENT MESSAGE (OUTPATIENT)
Dept: CARDIOLOGY | Facility: HOSPITAL | Age: 68
End: 2023-12-07
Payer: MEDICARE

## 2023-12-07 NOTE — TELEPHONE ENCOUNTER
AF with RVR noted during device remote check:    Patient symptoms: call placed to patient yesterday in regards to ongoing AF with RVR on presenting, no updated transmission noted, attempted to reconnect, transmission received today. Reviewed with patient the time and date of ongoing event, patient states she was sleeping and denies any s/s, states she has not taken Flecainide as needed in 3-4weeks, has not had any symptoms that has lasted long enough to need it. Patient states she is happy with the POC and does not want to change anything at this time    Current burden: 6.7%    Max duration seen: 12/4, 4hr 42mins at 2:49am MVR 115bpm      Ventricular rates:    Needs improvement    Anticoagulation status:  Eliquis 5mg BID   Toprol XL 12.5 mg  Flecainide 100 mg bid as needed

## 2023-12-11 LAB
OHS CV AF BURDEN PERCENT: 1.4
OHS CV AF BURDEN PERCENT: < 1
OHS CV DC REMOTE DEVICE TYPE: NORMAL
OHS CV DC REMOTE DEVICE TYPE: NORMAL
OHS CV ICD SHOCK: NO
OHS CV ICD SHOCK: NO

## 2023-12-19 ENCOUNTER — TELEPHONE (OUTPATIENT)
Dept: FAMILY MEDICINE | Facility: CLINIC | Age: 68
End: 2023-12-19
Payer: MEDICARE

## 2023-12-19 NOTE — TELEPHONE ENCOUNTER
----- Message from Betina Roy sent at 12/18/2023 12:16 PM CST -----  Contact: self  Type:  Needs Medical Advice    Who Called: self  Symptoms (please be specific): pt is having lower abdominal pain  and wanted to see if dr chacon squeeze her in.  Would the patient rather a call back or a response via [facility]? call  Best Call Back Number: [phone] (home)     Additional Information: please advise and thank you.

## 2023-12-19 NOTE — TELEPHONE ENCOUNTER
Returned call to pt.  She went to an urgent care previously and was told that they could not see her because they could not do an ultrasound.  Offered Thursday at 0830, pt declined saying that she has too much to do that day.  She advised that if symptoms persists or worsen, she will go to the ER for evaluation.

## 2024-01-05 ENCOUNTER — CLINICAL SUPPORT (OUTPATIENT)
Dept: CARDIOLOGY | Facility: HOSPITAL | Age: 69
End: 2024-01-05
Payer: MEDICARE

## 2024-01-05 ENCOUNTER — HOSPITAL ENCOUNTER (OUTPATIENT)
Dept: CARDIOLOGY | Facility: HOSPITAL | Age: 69
Discharge: HOME | End: 2024-01-05
Attending: INTERNAL MEDICINE
Payer: MEDICARE

## 2024-01-05 DIAGNOSIS — I49.8 OTHER SPECIFIED CARDIAC ARRHYTHMIAS: ICD-10-CM

## 2024-01-05 PROCEDURE — 93298 REM INTERROG DEV EVAL SCRMS: CPT | Mod: ,,, | Performed by: INTERNAL MEDICINE

## 2024-01-11 DIAGNOSIS — Z00.00 ENCOUNTER FOR MEDICARE ANNUAL WELLNESS EXAM: ICD-10-CM

## 2024-01-22 LAB
OHS CV AF BURDEN PERCENT: 2.1
OHS CV DC REMOTE DEVICE TYPE: NORMAL
OHS CV ICD SHOCK: NO

## 2024-01-31 ENCOUNTER — HOSPITAL ENCOUNTER (OUTPATIENT)
Dept: CARDIOLOGY | Facility: HOSPITAL | Age: 69
Discharge: HOME | End: 2024-01-31
Attending: INTERNAL MEDICINE
Payer: MEDICARE

## 2024-01-31 VITALS — WEIGHT: 149 LBS | BODY MASS INDEX: 24.83 KG/M2 | HEIGHT: 65 IN

## 2024-01-31 DIAGNOSIS — I48.0 PAROXYSMAL ATRIAL FIBRILLATION: ICD-10-CM

## 2024-01-31 PROCEDURE — 93306 TTE W/DOPPLER COMPLETE: CPT | Mod: 26,,, | Performed by: INTERNAL MEDICINE

## 2024-01-31 PROCEDURE — 93306 TTE W/DOPPLER COMPLETE: CPT | Mod: PO

## 2024-02-01 LAB
AV INDEX (PROSTH): 0.73
AV MEAN GRADIENT: 6 MMHG
AV PEAK GRADIENT: 10 MMHG
AV VALVE AREA BY VELOCITY RATIO: 2.77 CM²
AV VALVE AREA: 2.89 CM²
AV VELOCITY RATIO: 0.7
BSA FOR ECHO PROCEDURE: 1.76 M2
CV ECHO LV RWT: 0.39 CM
DOP CALC AO PEAK VEL: 1.59 M/S
DOP CALC AO VTI: 35.8 CM
DOP CALC LVOT AREA: 4 CM2
DOP CALC LVOT DIAMETER: 2.25 CM
DOP CALC LVOT PEAK VEL: 1.11 M/S
DOP CALC LVOT STROKE VOLUME: 103.33 CM3
DOP CALCLVOT PEAK VEL VTI: 26 CM
E WAVE DECELERATION TIME: 234.38 MSEC
E/A RATIO: 0.73
E/E' RATIO: 9 M/S
ECHO LV POSTERIOR WALL: 0.78 CM (ref 0.6–1.1)
EJECTION FRACTION: 65 %
FRACTIONAL SHORTENING: 44 % (ref 28–44)
INTERVENTRICULAR SEPTUM: 0.98 CM (ref 0.6–1.1)
LEFT ATRIUM SIZE: 3.77 CM
LEFT ATRIUM VOLUME INDEX MOD: 27.3 ML/M2
LEFT ATRIUM VOLUME MOD: 47.72 CM3
LEFT INTERNAL DIMENSION IN SYSTOLE: 2.25 CM (ref 2.1–4)
LEFT VENTRICLE DIASTOLIC VOLUME INDEX: 39.98 ML/M2
LEFT VENTRICLE DIASTOLIC VOLUME: 69.96 ML
LEFT VENTRICLE MASS INDEX: 61 G/M2
LEFT VENTRICLE SYSTOLIC VOLUME INDEX: 9.8 ML/M2
LEFT VENTRICLE SYSTOLIC VOLUME: 17.23 ML
LEFT VENTRICULAR INTERNAL DIMENSION IN DIASTOLE: 4 CM (ref 3.5–6)
LEFT VENTRICULAR MASS: 106.35 G
LV LATERAL E/E' RATIO: 9 M/S
LV SEPTAL E/E' RATIO: 9 M/S
LVOT MG: 2.19 MMHG
LVOT MV: 0.71 CM/S
MV PEAK A VEL: 0.99 M/S
MV PEAK E VEL: 0.72 M/S
PISA TR MAX VEL: 2.18 M/S
PULM VEIN S/D RATIO: 1.77
PV PEAK D VEL: 0.3 M/S
PV PEAK S VEL: 0.53 M/S
RA PRESSURE ESTIMATED: 3 MMHG
RIGHT VENTRICULAR END-DIASTOLIC DIMENSION: 3.03 CM
RIGHT VENTRICULAR LENGTH IN DIASTOLE (APICAL 4-CHAMBER VIEW): 6.37 CM
RV MID DIAMA: 1.94 CM
RV TB RVSP: 5 MMHG
RV TISSUE DOPPLER FREE WALL SYSTOLIC VELOCITY 1 (APICAL 4 CHAMBER VIEW): 14.99 CM/S
TDI LATERAL: 0.08 M/S
TDI SEPTAL: 0.08 M/S
TDI: 0.08 M/S
TR MAX PG: 19 MMHG
TRICUSPID ANNULAR PLANE SYSTOLIC EXCURSION: 1.81 CM
TV REST PULMONARY ARTERY PRESSURE: 22 MMHG
Z-SCORE OF LEFT VENTRICULAR DIMENSION IN END DIASTOLE: -1.91
Z-SCORE OF LEFT VENTRICULAR DIMENSION IN END SYSTOLE: -2.26

## 2024-02-05 ENCOUNTER — CLINICAL SUPPORT (OUTPATIENT)
Dept: CARDIOLOGY | Facility: HOSPITAL | Age: 69
End: 2024-02-05
Payer: MEDICARE

## 2024-02-05 ENCOUNTER — PATIENT MESSAGE (OUTPATIENT)
Dept: NEUROLOGY | Facility: CLINIC | Age: 69
End: 2024-02-05

## 2024-02-05 ENCOUNTER — OFFICE VISIT (OUTPATIENT)
Dept: NEUROLOGY | Facility: CLINIC | Age: 69
End: 2024-02-05
Payer: MEDICARE

## 2024-02-05 ENCOUNTER — HOSPITAL ENCOUNTER (OUTPATIENT)
Dept: CARDIOLOGY | Facility: HOSPITAL | Age: 69
Discharge: HOME | End: 2024-02-05
Attending: INTERNAL MEDICINE
Payer: MEDICARE

## 2024-02-05 ENCOUNTER — TELEPHONE (OUTPATIENT)
Dept: NEUROLOGY | Facility: CLINIC | Age: 69
End: 2024-02-05

## 2024-02-05 VITALS
BODY MASS INDEX: 25.89 KG/M2 | SYSTOLIC BLOOD PRESSURE: 140 MMHG | HEIGHT: 65 IN | RESPIRATION RATE: 18 BRPM | WEIGHT: 155.38 LBS | HEART RATE: 59 BPM | DIASTOLIC BLOOD PRESSURE: 73 MMHG

## 2024-02-05 DIAGNOSIS — G25.0 BENIGN FAMILIAL TREMOR: ICD-10-CM

## 2024-02-05 DIAGNOSIS — R25.1 TREMOR: Primary | ICD-10-CM

## 2024-02-05 DIAGNOSIS — R25.1 TREMOR: ICD-10-CM

## 2024-02-05 DIAGNOSIS — I49.8 OTHER SPECIFIED CARDIAC ARRHYTHMIAS: ICD-10-CM

## 2024-02-05 DIAGNOSIS — G25.0 BENIGN FAMILIAL TREMOR: Primary | ICD-10-CM

## 2024-02-05 PROCEDURE — 99215 OFFICE O/P EST HI 40 MIN: CPT | Mod: S$PBB,,, | Performed by: NURSE PRACTITIONER

## 2024-02-05 PROCEDURE — 99215 OFFICE O/P EST HI 40 MIN: CPT | Mod: PBBFAC,PO | Performed by: NURSE PRACTITIONER

## 2024-02-05 PROCEDURE — 93298 REM INTERROG DEV EVAL SCRMS: CPT | Mod: ,,, | Performed by: INTERNAL MEDICINE

## 2024-02-05 PROCEDURE — 99999 PR PBB SHADOW E&M-EST. PATIENT-LVL V: CPT | Mod: PBBFAC,,, | Performed by: NURSE PRACTITIONER

## 2024-02-05 RX ORDER — PROPRANOLOL HYDROCHLORIDE 20 MG/1
20 TABLET ORAL 2 TIMES DAILY
Qty: 60 TABLET | Refills: 2 | Status: SHIPPED | OUTPATIENT
Start: 2024-02-05 | End: 2024-02-29

## 2024-02-05 RX ORDER — CEFDINIR 300 MG/1
300 CAPSULE ORAL
COMMUNITY
Start: 2024-01-05 | End: 2024-02-05

## 2024-02-05 RX ORDER — MINERAL OIL
180 ENEMA (ML) RECTAL
COMMUNITY
Start: 2023-11-26

## 2024-02-05 NOTE — ASSESSMENT & PLAN NOTE
Patient is a 69 y/o female that presents for hand and chin tremor.   - onset ~ 20 years ago to left hand only. This has since progressed to the right hand and her chin.   Hand tremors are not very bothersome to her but chin tremor causes her to clinch her jaw thus causes discomfort.   Neuro exam noted with bilateral hand tremor (L>R) on action and mild amplitude chin tremor.    - no PD features  Suspect ET   - familial as her father also had a tremor  Discussed gold standard medications at length    - Primidone is contraindicated with use of Eliquis    - she is already taking Metoprolol. Will message her cardiologist to inquire about switching metoprolol to propranolol. If not consider second line therapy.  Recommend decrease caffeine intake

## 2024-02-05 NOTE — PROGRESS NOTES
NEUROLOGY  Outpatient Consultation Visit     [facility]  [address] [facility] [address], [place] [address]  [phone] (office) / [phone] (fax)    Patient Name:  Tamica Cardona  :  1955  MR #:  [id number]  Acct #:  [id number]    Date of Neurology Consult: 2024  Name of Provider: PAOLA Carmichael    Other Physicians:  Tamica Kent, NP (Primary Care Physician); Tamica Kent* (Referring)      Chief Complaint: Tremors      History of Present Illness (HPI):  Tamica Cardona is a right handed 68 y.o. female with a PMHX of breast cancer in remission, Afib on eliquis, anxiety , depression    Patient presents today for tremors. About 20 years ago it started to her left hand, 4th and 5th fingers only. It occurred only when performing an action. Gradually over the years it progressed to the entire left hand but again only when she was using her hand. In the last 6 months she has noticed a tremor the right hand and to her chin. The tremor to her chin is bothersome as she clinches her jaw to stop it and now this is causing pain in her jaw. The hand tremors do not bother her. She is still able to do the things she loves and ADLs.   She believes her father had a tremor but it was reported to be medication induced. She rarely drinks alcohol. She does have afib and notices the tremor to be worse when she is in afib. She drinks 2 large cups of green tea daily. She does dilute this with water. Her writing is very slightly affected. She takes 1.5 tablets of Lexapro daily for anxiety.       Past Medical, Surgical, Family & Social History:   Past Medical History:   Diagnosis Date    Allergy     Anxiety     Atrial fibrillation 10/12/2020    Breast cancer 2003    right    Depression     General anesthetics causing adverse effect in therapeutic use     slow to wake-- easily sedated    History of UTI     Interstitial cystitis 2003    Neutropenia      Past Surgical History:    Procedure Laterality Date    BREAST BIOPSY Left 2006    benign    CYSTOSCOPY  2003    EYE SURGERY  01/2015    HYSTERECTOMY      TVH, ovaries intact. Uterine prolapse    INSERTION OF IMPLANTABLE LOOP RECORDER Left 02/08/2021    Procedure: Insertion, Implantable Loop Recorder;  Surgeon: Collin Roberts MD;  Location: [facility];  Service: Cardiology;  Laterality: Left;    KNEE ARTHROSCOPY W/ MENISCECTOMY  1992    LT    left breast biopsy  2006    benign    MASTECTOMY, RADICAL Right 12/2003    RETINAL DETACHMENT SURGERY Right      Family History   Problem Relation Age of Onset    Cancer Father         bladder, prostate, lung cancer    Glaucoma Father     Cataracts Father     Macular degeneration Father     No Known Problems Mother     Cataracts Brother     Retinal detachment Brother     Glaucoma Brother     No Known Problems Sister     No Known Problems Maternal Aunt     No Known Problems Maternal Uncle     No Known Problems Paternal Aunt     No Known Problems Paternal Uncle     No Known Problems Maternal Grandmother     No Known Problems Maternal Grandfather     No Known Problems Paternal Grandmother     No Known Problems Paternal Grandfather     Urolithiasis Neg Hx     Kidney cancer Neg Hx     Amblyopia Neg Hx     Blindness Neg Hx     Diabetes Neg Hx     Hypertension Neg Hx     Strabismus Neg Hx     Stroke Neg Hx     Thyroid disease Neg Hx     Melanoma Neg Hx      Alcohol use:  reports no history of alcohol use.   (Of note, 0.6 oz = 1 beer or 6 oz = 10 beers).  Tobacco use:  reports that she has never smoked. She has never used smokeless tobacco.  Street drug use:  reports no history of drug use.  Allergies: Thimerosal, Erythromycin base, and Penicillins.    Home Medications:     Current Outpatient Medications:     acetaminophen (TYLENOL) 500 MG tablet, Take 750 mg by mouth every 6 (six) hours as needed for Pain., Disp: , Rfl:     apixaban (ELIQUIS) 5 mg Tab, Take 1 tablet (5 mg total) by mouth 2 (two) times  "daily., Disp: 60 tablet, Rfl: 8    ascorbic acid, vitamin C, (VITAMIN C) 500 MG tablet, Take 500 mg by mouth once daily., Disp: , Rfl:     calcium carbonate (OS-DESTINY) 600 mg (1,500 mg) Tab, Take 600 mg by mouth 2 (two) times daily with meals., Disp: , Rfl:     clonazePAM (KLONOPIN) 0.5 MG tablet, Take 1/2 to 1 tablet daily as needed for anxiety, Disp: 30 tablet, Rfl: 0    EScitalopram oxalate (LEXAPRO) 10 MG tablet, Take 1.5 tablets (15 mg total) by mouth once daily., Disp: 180 tablet, Rfl: 3    ferrous gluconate (FERGON) 325 MG tablet, Take 45 mg by mouth daily with breakfast., Disp: , Rfl:     fexofenadine (ALLEGRA ALLERGY) 180 MG tablet, 180 mg., Disp: , Rfl:     fluticasone propionate (FLONASE) 50 mcg/actuation nasal spray, , Disp: , Rfl:     loratadine (CLARITIN) 10 mg tablet, Take 10 mg by mouth daily as needed. , Disp: , Rfl:     meclizine (ANTIVERT) 25 mg tablet, Take 1 tablet (25 mg total) by mouth 3 (three) times daily as needed., Disp: 30 tablet, Rfl: 1    metoprolol succinate (TOPROL-XL) 25 MG 24 hr tablet, Take one tablet by mouth daily, Disp: 90 tablet, Rfl: 1    omega 3-dha-epa-fish oil 500-100-1,000 mg Cap, Take 1 capsule by mouth once daily. , Disp: , Rfl:     rosuvastatin (CRESTOR) 10 MG tablet, Take 1 tablet (10 mg total) by mouth once daily., Disp: 90 tablet, Rfl: 3    vitamin D (VITAMIN D3) 1000 units Tab, Take 1,000 Units by mouth once daily., Disp: , Rfl:     flecainide (TAMBOCOR) 100 MG Tab, Take 1 tablet (100 mg total) by mouth every 12 (twelve) hours as needed., Disp: 60 tablet, Rfl: 11    Physical Examination:  BP (!) 140/73 (BP Location: Left arm, Patient Position: Sitting, BP Method: Small (Automatic))   Pulse (!) 59   Resp 18   Ht 5' 5" (1.651 m)   Wt 70.5 kg (155 lb 6.4 oz)   BMI 25.86 kg/m²     GENERAL:  General appearance: Well, non-toxic appearing.  No apparent distress.  Neck: supple.  .    MENTAL STATUS:  Alertness, attention span & concentration: normal.  Language: " normal.  Orientation to self, place & time:  normal.  Memory, recent & remote: normal.  Fund of knowledge: normal.      SPEECH:  Clear and fluent.  Follows complex commands.      CRANIAL NERVES:  Cranial Nerves II-XII were examined.  II - Visual fields: normal.  III, IV, VI: PERRL, EOMI, No ptosis, No nystagmus.  V - Facial sensation: normal.  VII - Face symmetry & mobility: normal.  VIII - Hearing: normal  IX, X - Palate: mobile & midline.  XI - Shoulder shrug: normal.  XII - Tongue protrusion: normal.        GROSS MOTOR:  Gait & station: non focal  Tone: normal.  Arms to core: fluctuating hand tremor but less  Arms extended: bilateral hand tremor (L>R)  Abnormal movements: none.  Finger-nose: normal.  Rapid alternating movements: decreased to hands but not decrementing; foot taps normal   Pronator drift: normal      MUSCLE STRENGTH:     RIGHT    LEFT   5 Deltoids 5   5 Biceps 5   5 Triceps 5   5 Forearm.Pr. 5        5 Iliopsoas flex    5   5 Hip Abduct 5   5 Hip Adduct 5   5 Quads 5   5 Hams 5   5 Dorsiflex 5   5 Plantar Flex 5     REFLEXES:    RIGHT Reflex   LEFT   2+ Biceps 2+   2+ Brachiorad. 2+        2+ Patellar 2+         SENSORY:  Light touch: Normal throughout.             Diagnostic Data Reviewed:   I have personally reviewed provider notes, labs and imaging made available to me today.     Imaging:  N/a  Cardiac:  N/a  Labs:  Lab Results   Component Value Date    WBC 3.82 (L) 10/10/2022    HGB 13.9 10/10/2022    HCT 43.5 10/10/2022     10/10/2022    MCV 95 10/10/2022    RDW 13.4 10/10/2022     Lab Results   Component Value Date     04/21/2023    K 4.4 04/21/2023     04/21/2023    CO2 23 04/21/2023    BUN 8 04/21/2023    CREATININE 0.8 04/21/2023    GLU 86 04/21/2023    CALCIUM 9.2 04/21/2023    MG 1.7 10/08/2006    PHOS 3.2 10/08/2006     Lab Results   Component Value Date    PROT 6.6 04/21/2023    ALBUMIN 3.7 04/21/2023    BILITOT 1.0 04/21/2023    AST 18 04/21/2023    ALKPHOS 81  "04/21/2023    ALT 18 04/21/2023     Lab Results   Component Value Date    INR 1.1 10/08/2006     Lab Results   Component Value Date    CHOL 142 04/21/2023    HDL 49 04/21/2023    LDLCALC 78.6 04/21/2023    TRIG 72 04/21/2023    CHOLHDL 34.5 04/21/2023     No results found for: "LABA1C", "HGBA1C"   Lab Results   Component Value Date    CMYCKGMT82 408 10/29/2019     Lab Results   Component Value Date    FOLATE 9.3 10/29/2019     Lab Results   Component Value Date    TSH 2.036 04/21/2023     No results found for: "VITAMINB1"  No results found for: "RPR"        Assessment and Plan:  Tamica Cardona is a 68 y.o. female.    Problem List Items Addressed This Visit          Neuro    Benign familial tremor - Primary    Current Assessment & Plan     Patient is a 67 y/o female that presents for hand and chin tremor.   - onset ~ 20 years ago to left hand only. This has since progressed to the right hand and her chin.   Hand tremors are not very bothersome to her but chin tremor causes her to clinch her jaw thus causes discomfort.   Neuro exam noted with bilateral hand tremor (L>R) on action and mild amplitude chin tremor.    - no PD features  Suspect ET   - familial as her father also had a tremor  Discussed gold standard medications at length    - Primidone is contraindicated with use of Eliquis    - she is already taking Metoprolol. Will message her cardiologist to inquire about switching metoprolol to propranolol. If not consider second line therapy.  Recommend decrease caffeine intake             Other Visit Diagnoses       Tremor                      Important to note, also  has a past medical history of Allergy, Anxiety, Atrial fibrillation (10/12/2020), Breast cancer (11/2003), Depression, General anesthetics causing adverse effect in therapeutic use, History of UTI, Interstitial cystitis (2003), and Neutropenia.            The patient will return to clinic in 3 weeks        All questions were answered and patient is " comfortable with the plan.       Thank you very much for the opportunity to assist in this patient's care.    If you have any questions or concerns, please do not hesitate to contact me at any time.    Sincerely,     PAOLA Carmichael  [facility] [place]         I spent a total of 40 minutes on the day of the visit.This includes face to face time and non-face to face time preparing to see the patient (eg, review of tests), Obtaining and/or reviewing separately obtained history, Documenting clinical information in the electronic or other health record, Independently interpreting resultsand communicating results to the patient/family/caregiver, or Care coordination.

## 2024-02-05 NOTE — TELEPHONE ENCOUNTER
----- Message from Collin Roberts MD sent at 2/5/2024  1:23 PM CST -----  Regarding: RE: tremor med  Totally cool with switching her.  She just needs to be on some sort of beta-blocker to go along with her flecainide.  Any dose you think best is fine with me.  When you start the propranolol, can stop the metoprolol at the same time, no weaning needed.    -Collin  ----- Message -----  From: Marci Mas FNP  Sent: 2/5/2024  12:47 PM CST  To: Collin Roberts MD  Subject: tremor med                                       Hi there Dr. Polanco,  I saw this nice lady for tremor today.  She is reporting that her hand tremors are not bothersome but her chin tremor is.  After discussing gold standard treatment with her, propranolol could be an option but she is already taking Metoprolol. Primdione would not be good option as it can lower the efficacy of the Eliquis. What are your thoughts on switching her metoprolol to propranolol? If not, I can try second line therapy but wanted to discuss with you first.     Thanks,  Marci Mas, NP

## 2024-02-05 NOTE — PATIENT INSTRUCTIONS
"What is tremor?   -- Tremor is the medical term for trembling or shaking. A person with tremor has a body part that shakes, and the person cannot control the shaking. Most often this shaking affects the hands or the head, but other body parts can be affected, too. The tremor can be a problem on its own, or it can be caused by another health problem.  There are several different types of tremor. They fall into several main groups:  ?Rest tremors - Rest tremors happen while you are sitting or lying down and relaxed. People who have a rest tremor can usually stop the tremor by making a point of moving the part of their body that shakes.  ?Action tremors - Action tremors happen when you are moving your muscles on purpose. There are a few different kinds of action tremors, including:  Kinetic tremors - These happen when you move on purpose, such as writing or drinking from a cup. Sometimes, the tremor gets worse gradually as you get closer to what you trying to do or reach. This is called "intention" tremor.  Postural tremors - These happen when you try to hold a body part still in a position other than its resting position. For example, your legs might shake when you are standing up, or your arms might shake if you hold them out in front of you.   Isometric tremors - These happen when you move a muscle against something that is still. For example, they might happen when you push against a wall or make a fist with your hand.  ?Functional tremor - Functional tremor can combine features of rest and action tremors. Unlike other kinds of tremor, functional tremor has no known medical cause. This kind of tremor usually gets less severe if you are distracted while your doctor examines you, for example, if they ask you to do something else with another part of your body. Other types of tremor tend to get worse with distraction.     What are the most common causes of rest tremor?   -- The most common cause of rest tremor is " "Parkinson disease. If that is the cause of your tremor, your doctor or nurse will probably focus on treating your Parkinson disease. This will hopefully help reduce your tremor.  Other problems that can cause rest tremors include diseases that damage parts of the brain, and a rare condition called Hemanth disease, which causes copper to build up in the body.    What are the most common causes of action tremor?   -- The most common cause is something called a "physiologic" tremor. Everyone, even people who are healthy, has a little bit of shaking of the hands. This is what doctors refer to as "physiologic tremor." It is normal, and you don't usually notice it, because it is very mild. But in some cases this "physiologic" or normal tremor can become exaggerated. This can happen:  ?If you take certain medicines, such as those used to treat depression, or asthma and other breathing problems  ?If you drink coffee, smoke cigarettes, or use "stimulants" (including caffeine and certain medicines)  ?If you are anxious, excited, or afraid  ?If your muscles are very tired, for example because you just worked out  ?As the effects of alcohol or other drugs are wearing off  ?If you have an overactive thyroid gland  ?If you have a fever  If your tremor is caused by 1 of the problems listed above, the tremor should go away as soon as the problem goes away. Of course, if your tremor is caused by a medicine, you might not be able to stop taking it. But it might be possible to switch medicines or to lower the dose.    What is essential tremor?   -- Essential tremor is a nervous system problem that causes action tremor. It is different from physiologic tremor in that it is not related to medicines, substances, or physical conditions such as fever. Essential tremor can be passed on in families.  People who have essential tremor usually shake when they try to hold their arms out straight. They also tend to shake when they move their hands " "with a goal in mind. For instance, their hands might shake when they try to write, drink from a glass, or touch their nose with their finger.  Essential tremor sometimes even affects the head. This makes it look as though the person is nodding their head "yes-yes" or shaking their head "no-no."    Is there a test to find out the cause of tremor?   -- No, there is no test. But your doctor or nurse can learn about a lot about your tremor just by asking you questions and watching you move. Your doctor or nurse might send you for a brain scan or blood tests to make sure your tremor is not caused by something serious. But it's likely that they will be able to tell what's wrong just by doing an exam.  How is tremor treated? -- If a tremor is caused by another medical problem, treating that problem - if it can be treated - sometimes helps reduce the tremor, too. For example, people whose tremor is caused by high thyroid hormone levels often stop shaking when their hormone levels go back to normal.  Even when no other medical problems are involved, there are treatments that can help. There are a few medicines that can reduce a person's tremor. If the medicines are not effective enough and the tremor is severe, it is even possible to have a device implanted in the brain that can help control tremor.    "

## 2024-02-06 ENCOUNTER — PATIENT MESSAGE (OUTPATIENT)
Dept: CARDIOLOGY | Facility: HOSPITAL | Age: 69
End: 2024-02-06
Payer: MEDICARE

## 2024-02-06 NOTE — TELEPHONE ENCOUNTER
"AF with RVR noted during device remote check:    Overall burden:  1.8% 12/7/23-2/3/24    Max duration seen: 2/2/24 4hr 8mins, nocturnal event, detected @ 2:43am, MVR 118bpm       Multiple events noted 1/29/24 with RVR suggesting continuous event, nocturnal events      Ventricular rates:  30day histogram,  Needs improvement    Anticoagulation status:  Eliquis 5mg BID  Flecainide 100mg PRN  Propranolol 20mg BID      Patient symptoms: Per patient "Thanks Jaycee.  Those two date episodes did not wake me up.  Dont know the cause.   And, yes, I am still satisfied with my treatment. "              "

## 2024-02-12 LAB
OHS CV AF BURDEN PERCENT: 1.9
OHS CV DC REMOTE DEVICE TYPE: NORMAL
OHS CV ICD SHOCK: NO

## 2024-02-20 DIAGNOSIS — E78.2 MIXED HYPERLIPIDEMIA: Primary | ICD-10-CM

## 2024-02-20 RX ORDER — ROSUVASTATIN CALCIUM 10 MG/1
10 TABLET, COATED ORAL DAILY
Qty: 90 TABLET | Refills: 3 | Status: SHIPPED | OUTPATIENT
Start: 2024-02-20

## 2024-02-26 ENCOUNTER — TELEPHONE (OUTPATIENT)
Dept: NEUROLOGY | Facility: CLINIC | Age: 69
End: 2024-02-26
Payer: MEDICARE

## 2024-02-29 ENCOUNTER — OFFICE VISIT (OUTPATIENT)
Dept: NEUROLOGY | Facility: CLINIC | Age: 69
End: 2024-02-29
Payer: MEDICARE

## 2024-02-29 DIAGNOSIS — R25.1 TREMOR: Primary | ICD-10-CM

## 2024-02-29 PROCEDURE — 99214 OFFICE O/P EST MOD 30 MIN: CPT | Mod: 95,,, | Performed by: NURSE PRACTITIONER

## 2024-02-29 RX ORDER — PROPRANOLOL HYDROCHLORIDE 40 MG/1
40 TABLET ORAL 2 TIMES DAILY
Qty: 60 TABLET | Refills: 2 | Status: SHIPPED | OUTPATIENT
Start: 2024-02-29 | End: 2024-05-08 | Stop reason: DRUGHIGH

## 2024-02-29 NOTE — ASSESSMENT & PLAN NOTE
Patient is a 67 y/o female that presents for tremor f/u.   - hand and chin tremor.   - onset ~ 20 years ago to left hand only. This has since progressed to the right hand and her chin.   Hand tremors are not very bothersome to her but chin tremor causes her to clinch her jaw thus causes discomfort.   Neuro exam noted with bilateral hand tremor (L>R) on action and mild amplitude chin tremor.    - no PD features  Suspect ET   - familial as her father also had a tremor  Discussed gold standard medications at length    - Primidone is contraindicated with use of Eliquis    - did well initially on Propranolol but not much aid after several days. Increase to 40 mg BID.   Recommend decrease caffeine intake

## 2024-02-29 NOTE — PROGRESS NOTES
NEUROLOGY  Outpatient Follow Up    [facility]  [address] [facility], [place] [address]  [phone] (office) / [phone] (fax)    Patient Name:  Tamica Cardona  :  1955  MR #:  [id number]  Acct #:  [id number]    Date of Neurology Visit: 2024  Name of Provider: PAOLA Carmichael    Other Physicians:  Tamica Kent, JI (Primary Care Physician); No ref. provider found (Referring)      Chief Complaint: Tremors      History of Present Illness (HPI):  Tamica Cardona is a right handed 68 y.o. female with a PMHX of breast cancer in remission, Afib on eliquis, anxiety , depression     Patient presents today for tremors. About 20 years ago it started to her left hand, 4th and 5th fingers only. It occurred only when performing an action. Gradually over the years it progressed to the entire left hand but again only when she was using her hand. In the last 6 months she has noticed a tremor the right hand and to her chin. The tremor to her chin is bothersome as she clinches her jaw to stop it and now this is causing pain in her jaw. The hand tremors do not bother her. She is still able to do the things she loves and ADLs.   She believes her father had a tremor but it was reported to be medication induced. She rarely drinks alcohol. She does have afib and notices the tremor to be worse when she is in afib. She drinks 2 large cups of green tea daily. She does dilute this with water. Her writing is very slightly affected. She takes 1.5 tablets of Lexapro daily for anxiety.          Interval Hx 2024:   Patient presents virtually for tremor follow up.   After her last visit she did start taking Propranolol and did notice a change initially but not so much anymore. She is tolerating the medication well and denies adverse reactions.       Past Medical, Surgical, Family & Social History:   Reviewed and updated.    Home Medications:     Current Outpatient Medications:      acetaminophen (TYLENOL) 500 MG tablet, Take 750 mg by mouth every 6 (six) hours as needed for Pain., Disp: , Rfl:     apixaban (ELIQUIS) 5 mg Tab, Take 1 tablet (5 mg total) by mouth 2 (two) times daily., Disp: 60 tablet, Rfl: 8    ascorbic acid, vitamin C, (VITAMIN C) 500 MG tablet, Take 500 mg by mouth once daily., Disp: , Rfl:     calcium carbonate (OS-DESTINY) 600 mg (1,500 mg) Tab, Take 600 mg by mouth 2 (two) times daily with meals., Disp: , Rfl:     clonazePAM (KLONOPIN) 0.5 MG tablet, Take 1/2 to 1 tablet daily as needed for anxiety, Disp: 30 tablet, Rfl: 0    EScitalopram oxalate (LEXAPRO) 10 MG tablet, Take 1.5 tablets (15 mg total) by mouth once daily., Disp: 180 tablet, Rfl: 3    ferrous gluconate (FERGON) 325 MG tablet, Take 45 mg by mouth daily with breakfast., Disp: , Rfl:     fexofenadine (ALLEGRA ALLERGY) 180 MG tablet, 180 mg., Disp: , Rfl:     flecainide (TAMBOCOR) 100 MG Tab, Take 1 tablet (100 mg total) by mouth every 12 (twelve) hours as needed., Disp: 60 tablet, Rfl: 11    fluticasone propionate (FLONASE) 50 mcg/actuation nasal spray, , Disp: , Rfl:     loratadine (CLARITIN) 10 mg tablet, Take 10 mg by mouth daily as needed. , Disp: , Rfl:     meclizine (ANTIVERT) 25 mg tablet, Take 1 tablet (25 mg total) by mouth 3 (three) times daily as needed., Disp: 30 tablet, Rfl: 1    omega 3-dha-epa-fish oil 500-100-1,000 mg Cap, Take 1 capsule by mouth once daily. , Disp: , Rfl:     propranoloL (INDERAL) 40 MG tablet, Take 1 tablet (40 mg total) by mouth 2 (two) times daily., Disp: 60 tablet, Rfl: 2    rosuvastatin (CRESTOR) 10 MG tablet, Take 1 tablet (10 mg total) by mouth once daily., Disp: 90 tablet, Rfl: 3    vitamin D (VITAMIN D3) 1000 units Tab, Take 1,000 Units by mouth once daily., Disp: , Rfl:     Physical Examination: limited due to virtual visit   There were no vitals taken for this visit.    GENERAL:  General appearance: Well, non-toxic appearing.  No apparent distress.  Neck: supple.  .      MENTAL STATUS:  Alertness, attention span & concentration: normal.  Language: normal.  Orientation to self, place & time:  normal.  Memory, recent & remote: normal.  Fund of knowledge: normal.        SPEECH:  Clear and fluent.  Follows complex commands.     PRIOR FACE TO FACE VISIT  CRANIAL NERVES:  Cranial Nerves II-XII were examined.  II - Visual fields: normal.  III, IV, VI: PERRL, EOMI, No ptosis, No nystagmus.  V - Facial sensation: normal.  VII - Face symmetry & mobility: normal.  VIII - Hearing: normal  IX, X - Palate: mobile & midline.  XI - Shoulder shrug: normal.  XII - Tongue protrusion: normal.           GROSS MOTOR:  Gait & station: non focal  Tone: normal.  Arms to core: fluctuating hand tremor but less  Arms extended: bilateral hand tremor (L>R)  Abnormal movements: none.  Finger-nose: normal.  Rapid alternating movements: decreased to hands but not decrementing; foot taps normal   Pronator drift: normal        MUSCLE STRENGTH:      RIGHT      LEFT   5 Deltoids 5   5 Biceps 5   5 Triceps 5   5 Forearm.Pr. 5           5 Iliopsoas flex    5   5 Hip Abduct 5   5 Hip Adduct 5   5 Quads 5   5 Hams 5   5 Dorsiflex 5   5 Plantar Flex 5      REFLEXES:     RIGHT Reflex    LEFT   2+ Biceps 2+   2+ Brachiorad. 2+           2+ Patellar 2+            SENSORY:  Light touch: Normal throughout.                     Diagnostic Data Reviewed:   I have personally reviewed provider notes, labs and imaging made available to me today.     Imaging:  N/a    Labs:  Lab Results   Component Value Date    WBC 3.82 (L) 10/10/2022    HGB 13.9 10/10/2022    HCT 43.5 10/10/2022     10/10/2022    MCV 95 10/10/2022    RDW 13.4 10/10/2022     Lab Results   Component Value Date     04/21/2023    K 4.4 04/21/2023     04/21/2023    CO2 23 04/21/2023    BUN 8 04/21/2023    CREATININE 0.8 04/21/2023    GLU 86 04/21/2023    CALCIUM 9.2 04/21/2023    MG 1.7 10/08/2006    PHOS 3.2 10/08/2006     Lab Results   Component  "Value Date    PROT 6.6 04/21/2023    ALBUMIN 3.7 04/21/2023    BILITOT 1.0 04/21/2023    AST 18 04/21/2023    ALKPHOS 81 04/21/2023    ALT 18 04/21/2023     Lab Results   Component Value Date    INR 1.1 10/08/2006     Lab Results   Component Value Date    CHOL 142 04/21/2023    HDL 49 04/21/2023    LDLCALC 78.6 04/21/2023    TRIG 72 04/21/2023    CHOLHDL 34.5 04/21/2023     No results found for: "LABA1C", "HGBA1C"   Lab Results   Component Value Date    AVYWQOLL76 408 10/29/2019     Lab Results   Component Value Date    FOLATE 9.3 10/29/2019     Lab Results   Component Value Date    TSH 2.036 04/21/2023     No results found for: "RPR"  No results found for: "VITAMINB1"                    Assessment and Plan:      Problem List Items Addressed This Visit          Neuro    Tremor - Primary    Current Assessment & Plan     Patient is a 67 y/o female that presents for tremor f/u.   - hand and chin tremor.   - onset ~ 20 years ago to left hand only. This has since progressed to the right hand and her chin.   Hand tremors are not very bothersome to her but chin tremor causes her to clinch her jaw thus causes discomfort.   Neuro exam noted with bilateral hand tremor (L>R) on action and mild amplitude chin tremor.    - no PD features  Suspect ET   - familial as her father also had a tremor  Discussed gold standard medications at length    - Primidone is contraindicated with use of Eliquis    - did well initially on Propranolol but not much aid after several days. Increase to 40 mg BID.   Recommend decrease caffeine intake                            Important to note, also  has a past medical history of Allergy, Anxiety, Atrial fibrillation (10/12/2020), Breast cancer (11/2003), Depression, General anesthetics causing adverse effect in therapeutic use, History of UTI, Interstitial cystitis (2003), and Neutropenia.          The patient will return to clinic in 2 weeks    All questions were answered and patient is comfortable " with the plan.         Thank you very much for the opportunity to assist in this patient's care.    If you have any questions or concerns, please do not hesitate to contact me at any time.      Sincerely,     PAOLA Carmichael  [facility] [place]     The patient location is: [place], [place]  The chief complaint leading to consultation is: f/u tremor    Visit type: audiovisual    Face to Face time with patient: - minutes of total time spent on the encounter, which includes face to face time and non-face to face time preparing to see the patient (eg, review of tests), Obtaining and/or reviewing separately obtained history, Documenting clinical information in the electronic or other health record, Independently interpreting results (not separately reported) and communicating results to the patient/family/caregiver, or Care coordination (not separately reported).         Each patient to whom he or she provides medical services by telemedicine is:  (1) informed of the relationship between the physician and patient and the respective role of any other health care provider with respect to management of the patient; and (2) notified that he or she may decline to receive medical services by telemedicine and may withdraw from such care at any time.    Notes:

## 2024-03-04 ENCOUNTER — TELEPHONE (OUTPATIENT)
Dept: NEUROLOGY | Facility: CLINIC | Age: 69
End: 2024-03-04
Payer: MEDICARE

## 2024-03-07 ENCOUNTER — HOSPITAL ENCOUNTER (OUTPATIENT)
Dept: CARDIOLOGY | Facility: HOSPITAL | Age: 69
Discharge: HOME | End: 2024-03-07
Attending: INTERNAL MEDICINE
Payer: MEDICARE

## 2024-03-07 ENCOUNTER — CLINICAL SUPPORT (OUTPATIENT)
Dept: CARDIOLOGY | Facility: HOSPITAL | Age: 69
End: 2024-03-07
Payer: MEDICARE

## 2024-03-07 DIAGNOSIS — I49.8 OTHER SPECIFIED CARDIAC ARRHYTHMIAS: ICD-10-CM

## 2024-03-07 LAB
OHS CV AF BURDEN PERCENT: < 1
OHS CV DC REMOTE DEVICE TYPE: NORMAL
OHS CV ICD SHOCK: NO

## 2024-03-07 PROCEDURE — 93298 REM INTERROG DEV EVAL SCRMS: CPT | Mod: ,,, | Performed by: INTERNAL MEDICINE

## 2024-03-09 ENCOUNTER — HOSPITAL ENCOUNTER (OUTPATIENT)
Dept: CARDIOLOGY | Facility: HOSPITAL | Age: 69
Discharge: HOME | End: 2024-03-09
Attending: INTERNAL MEDICINE
Payer: MEDICARE

## 2024-03-18 LAB
OHS CV AF BURDEN PERCENT: < 1
OHS CV DC REMOTE DEVICE TYPE: NORMAL
OHS CV ICD SHOCK: NO

## 2024-04-07 ENCOUNTER — HOSPITAL ENCOUNTER (OUTPATIENT)
Dept: CARDIOLOGY | Facility: HOSPITAL | Age: 69
Discharge: HOME | End: 2024-04-07
Attending: INTERNAL MEDICINE

## 2024-04-07 ENCOUNTER — CLINICAL SUPPORT (OUTPATIENT)
Dept: CARDIOLOGY | Facility: HOSPITAL | Age: 69
End: 2024-04-07
Payer: MEDICARE

## 2024-04-07 DIAGNOSIS — I49.8 OTHER SPECIFIED CARDIAC ARRHYTHMIAS: ICD-10-CM

## 2024-04-07 PROCEDURE — 93298 REM INTERROG DEV EVAL SCRMS: CPT | Mod: 26,,, | Performed by: INTERNAL MEDICINE

## 2024-04-10 ENCOUNTER — PATIENT MESSAGE (OUTPATIENT)
Dept: CARDIOLOGY | Facility: HOSPITAL | Age: 69
End: 2024-04-10
Payer: MEDICARE

## 2024-04-12 NOTE — TELEPHONE ENCOUNTER
AF with RVR >2hrs noted during device remote check on billable 3/7-4/7 :    Overall burden: 1.3%    Max duration seen: 3/30/24, 6hr 36mins MVR 136pm, nocturnal event, started 1:17am        Ventricular rates:         Anticoagulation status:  Eliquis 5mg BID  Flecainide 100mg BID, PRN  Propranolol 40mg BID    Patient symptoms: denies any s/s due to nocturnal event    F/U scheduled  6/11 with MARY Solomon

## 2024-04-17 ENCOUNTER — OFFICE VISIT (OUTPATIENT)
Dept: OPTOMETRY | Facility: CLINIC | Age: 69
End: 2024-04-17
Payer: MEDICARE

## 2024-04-17 DIAGNOSIS — H31.003 CHORIORETINAL SCAR, BILATERAL: ICD-10-CM

## 2024-04-17 DIAGNOSIS — H52.13 MYOPIA WITH ASTIGMATISM AND PRESBYOPIA, BILATERAL: ICD-10-CM

## 2024-04-17 DIAGNOSIS — H52.4 MYOPIA WITH ASTIGMATISM AND PRESBYOPIA, BILATERAL: ICD-10-CM

## 2024-04-17 DIAGNOSIS — H52.203 MYOPIA WITH ASTIGMATISM AND PRESBYOPIA, BILATERAL: ICD-10-CM

## 2024-04-17 DIAGNOSIS — Z13.5 GLAUCOMA SCREENING: ICD-10-CM

## 2024-04-17 DIAGNOSIS — H25.13 NUCLEAR SCLEROSIS, BILATERAL: Primary | ICD-10-CM

## 2024-04-17 LAB
OHS CV AF BURDEN PERCENT: 1.3
OHS CV DC REMOTE DEVICE TYPE: NORMAL
OHS CV ICD SHOCK: NO

## 2024-04-17 PROCEDURE — 92014 COMPRE OPH EXAM EST PT 1/>: CPT | Mod: S$PBB,,, | Performed by: OPTOMETRIST

## 2024-04-17 PROCEDURE — 92250 FUNDUS PHOTOGRAPHY W/I&R: CPT | Mod: PBBFAC,PO | Performed by: OPTOMETRIST

## 2024-04-17 PROCEDURE — 99214 OFFICE O/P EST MOD 30 MIN: CPT | Mod: PBBFAC,PO | Performed by: OPTOMETRIST

## 2024-04-17 PROCEDURE — 99999 PR PBB SHADOW E&M-EST. PATIENT-LVL IV: CPT | Mod: PBBFAC,,, | Performed by: OPTOMETRIST

## 2024-04-17 NOTE — PROGRESS NOTES
HPI    Pt here for complete exam with the complaint of blurred VA. Pt states   night driving has become more difficult. Pt denies flashes and floaters.   Allergy drops used at times. Currently using SVL only.  Last edited by Nichole Zee on 4/17/2024  9:58 AM.            Assessment /Plan     For exam results, see Encounter Report.    Nuclear sclerosis, bilateral    Chorioretinal scar, bilateral  -     Color Fundus Photography - OU - Both Eyes    Glaucoma screening    Myopia with astigmatism and presbyopia, bilateral      Vis sig NS w/ mild psc changes OD>>OS ---ready to consider consult at this time   Schedule with Dr Loco     2.   OPTOS 4/2024   Sup / temp ---CR scarring from hole/ tear repair --stable OS>OD       3.   Not suspect   4.   Update but hold for now    RTC cataract consult

## 2024-04-17 NOTE — PATIENT INSTRUCTIONS
FLASHES / FLOATERS / POSTERIOR VITREOUS DETACHMENT    Call the clinic if you have any further changes in symptoms.  Including:  Increased numbers of floaters or flashing lights, dimness or darkness that moves through or stays constant in your vision, or any pain in the eye (s).    You may sometimes see small specks or clouds moving in your field of vision.  They are called FLOATERS.  You can often see them when looking at a plain background, like a blank wall or blue neal.  Floaters are actually tiny clumps of gel or cells inside the VITREOUS, the clear jelly-like fluid that fills the inside of your eye.    While these objects look like they are in front of your eye, they are actually floating inside.  What you see are the shadows they cast on the RETINA, the nerve layer at the back of the eye that senses light and allows you to see.      POSTERIOR VITREOUS DETACHMENT    The appearance of new floaters may be alarming.  If you suddenly develop new floaters, you should contact your eye care professional  right away.    The retina can tear if the shrinking vitreous pulls away from the wall of the eye.  This sometimes causes a small amount of bleeding in the eye that may appear as new floaters.    A torn retina is always a serious problem, since it can lead to a retinal detachment.  You should see your eye care professional as soon as possible if:    even one new floater appears suddenly;  you see sudden flashes of light;  you notice other symptoms, like the loss of side vision, or a curtain closes down in your vision        POSTERIOR VITREOUS DETACHMENT is more common for people who:    are nearsighted;  have had cataract surgery;  have had YAG laser surgery of the eye;  have had inflammation inside the eye;  are over age 60.      While some floaters may remain visible, many of them will fade over time and become less noticeable.  Even if you've had some floaters for years, you should have your eyes checked as soon as  possible if you notice new ones.    FLASHING LIGHTS    When the vitreous gel rubs or pulls on the retina, you may see what look like flashing lights or lightning streaks.  These flashes can appear off and on for several weeks or months.      Some people experience flashes of light that appear as jagged lines or heat waves in both eyes, lasting 10-20 minutes.  These flashes are caused by a spasm of blood vessels in the brain, which is called a migraine.    If a headache follows these flashes, it's called a migraine headache.  If   no headache occurs, these flashes are called Ophthalmic or Ocular Migraine.          Cataract Surgery FAQs  Frequently Asked Questions    My doctor told me I have a cataract     What do I do next?   Relax. Cataracts are a normal part of aging, but can also be associated with diabetes, metabolic conditions, use of certain medications, and injury to the eye. Cataract surgery is among the safest and most common procedures performed today.  Most patients who have had cataract surgery report significant improvement in their quality of life because of their improved vision, with a speedy recovery and minimal discomfort or inconvenience.        Your optometrist will recommend a cataract surgeon who will examine your eyes, evaluate the need for surgery, and discuss the details with you and your family.     What exactly is a cataract?   A cataract is simply a darkening or clouding of the eyes internal lens, which blurs your vision.  It is not a film which grows over the eye, but rather a degenerative process which causes the eyes normally clear lens to become cloudy or hazy.     Because this degenerative process occurs slowly over many years, we generally wait until the cataract begins to significantly impact your vision and activities of daily living before recommend surgery.                     How is cataract surgery performed?  Cataract surgery involves removal of the dark or cloudy lens  from the eye, and implantation of a new, clear lens implant or IOL.  Cataract surgery is a lens replacement surgery.          Modern cataract surgery is performed as a same-day surgery and typically takes about 15 minutes to complete.  It is performed while you are awake, but you will be medicated so that you are relaxed and comfortable. Of course, the eye is anesthetized so that you dont feel any discomfort, and many people only vaguely remember the actual procedure, recalling only lights and the sensation of moisture on the eye.     Although is takes about a week to fully heal, most people are able to see better and resume their normal activities the very next day!  You will need to use eye drops for about one month after your surgery.     Will I need glasses after cataract surgery?   The purpose of cataract surgery is to improve the overall quality of your vision, but it does not necessarily eliminate the need for glasses. Although some people dont need to wear glasses after standard cataract surgery, most people will still need to wear glasses for certain tasks.  If you are interested in minimizing or even eliminating the need for glasses, you should ask your surgeon about Refractive Cataract Surgery.    What is Refractive Cataract Surgery?   Refractive Cataract Surgery refers to the use of additional techniques performed either at the time of your cataract surgery or soon afterwards, designed to minimize and often eliminate your need for glasses.  Technologies such as LASIK, Astigmatism Correcting Incisions, Multifocal, Accommodating, or Toric lens implants can all be used, depending on the particular needs of each patient. Only your surgeon can determine if you are a candidate and which techniques are appropriate for your goals and your eye.                         LASIK                Multifocal IOL         Will my insurance cover these additional procedures?   Although your insurance will fully cover  your cataract surgery, any other additional procedures -designed solely to eliminate the need for glasses- are considered elective (not medically necessary), and therefore not covered by your insurance.  Rest assured that your vision will be better after cataract surgery, in either case.  You simply need to decide whether minimizing your dependence on glasses is worth the additional investment in your eyes.  (Costs typically range between $1,500 to $2,500 per eye, depending on your needs).    View a 1hr video discussing cataract surgery with live patient questions and answers on the [facility] Web Site (Keywords: [facility] Cataract)    Costs for Advanced Technology Cataract Surgery    Distance only Vision Correction  $1,400 or $2,000  PER EYE   Correction of astigmatism with LASER or TORIC Lens Implant for distance vision  Requires advanced pre-operative measurements and surgical planning  Best opportunity for quality distance vision. Most patients are able to drive and see TV without glasses, but require reading glasses for near tasks  Cost based on amount of astigmatism needing correction needed      Distance and Near Vision Correction   $2,550 PER EYE  Correction of astigmatism with LASER or TORIC Lens Implant  Correction of both distance and near vision using Trifocal and Extended Depth of Focus Lens Implant  Requires advanced pre-operative measurements and surgical planning  Best opportunity for BOTH quality distance vision and near vision. Most patients are able to drive and also read without glasses.

## 2024-05-01 ENCOUNTER — HOSPITAL ENCOUNTER (OUTPATIENT)
Dept: CARDIOLOGY | Facility: HOSPITAL | Age: 69
Discharge: HOME | End: 2024-05-01
Attending: INTERNAL MEDICINE

## 2024-05-03 ENCOUNTER — PATIENT MESSAGE (OUTPATIENT)
Dept: CARDIOLOGY | Facility: HOSPITAL | Age: 69
End: 2024-05-03
Payer: MEDICARE

## 2024-05-07 NOTE — TELEPHONE ENCOUNTER
Atrial fibrillation with RVR  noted during device interrogation/device remote check:    Overall Buredn 0.9% 4/6-5/1/24    Max duration seen: 4/27 2hr 8mins MVR 140bpm          Ventricular rates:   Controlled    Needs improvement        Anticoagulation status: Eliquis 5mg BID  Flecainide 100mg PRN  Propranolol 40mg BID      Patient symptoms:  Yes I did feel those. Felt like rapid heart beatings, but started earlier in the evening. I was playing with my grandson and visiting my daughter. I was out of town, so couldnt check my rate and blood pressure on my machine. I also did not have a Flecainide on me, so I couldnt take it. When I got back to my hotel room I rested. It took a while but I got back to normal. No need to alert Dr Polanco because its still not happening too often. Thank you for checking with me.

## 2024-05-08 ENCOUNTER — CLINICAL SUPPORT (OUTPATIENT)
Dept: CARDIOLOGY | Facility: HOSPITAL | Age: 69
End: 2024-05-08
Payer: MEDICARE

## 2024-05-08 ENCOUNTER — OFFICE VISIT (OUTPATIENT)
Dept: FAMILY MEDICINE | Facility: CLINIC | Age: 69
End: 2024-05-08
Payer: MEDICARE

## 2024-05-08 ENCOUNTER — HOSPITAL ENCOUNTER (OUTPATIENT)
Dept: CARDIOLOGY | Facility: HOSPITAL | Age: 69
Discharge: HOME | End: 2024-05-08
Attending: INTERNAL MEDICINE

## 2024-05-08 VITALS
HEART RATE: 68 BPM | OXYGEN SATURATION: 95 % | HEIGHT: 65 IN | BODY MASS INDEX: 25.45 KG/M2 | DIASTOLIC BLOOD PRESSURE: 70 MMHG | SYSTOLIC BLOOD PRESSURE: 112 MMHG | WEIGHT: 152.75 LBS | TEMPERATURE: 98 F

## 2024-05-08 DIAGNOSIS — F32.A DEPRESSION, UNSPECIFIED DEPRESSION TYPE: ICD-10-CM

## 2024-05-08 DIAGNOSIS — H25.13 NUCLEAR SCLEROSIS OF BOTH EYES: ICD-10-CM

## 2024-05-08 DIAGNOSIS — G25.0 BENIGN FAMILIAL TREMOR: ICD-10-CM

## 2024-05-08 DIAGNOSIS — G47.33 OBSTRUCTIVE SLEEP APNEA SYNDROME: ICD-10-CM

## 2024-05-08 DIAGNOSIS — Z85.3 HISTORY OF BREAST CANCER: ICD-10-CM

## 2024-05-08 DIAGNOSIS — Z79.899 ENCOUNTER FOR LONG-TERM (CURRENT) USE OF MEDICATIONS: ICD-10-CM

## 2024-05-08 DIAGNOSIS — D50.9 IRON DEFICIENCY ANEMIA, UNSPECIFIED IRON DEFICIENCY ANEMIA TYPE: ICD-10-CM

## 2024-05-08 DIAGNOSIS — G44.85 PRIMARY STABBING HEADACHE: Primary | ICD-10-CM

## 2024-05-08 DIAGNOSIS — D70.9 NEUTROPENIA, UNSPECIFIED TYPE: ICD-10-CM

## 2024-05-08 DIAGNOSIS — E78.2 MIXED HYPERLIPIDEMIA: ICD-10-CM

## 2024-05-08 DIAGNOSIS — F41.9 ANXIETY DISORDER, UNSPECIFIED TYPE: ICD-10-CM

## 2024-05-08 DIAGNOSIS — I49.8 OTHER SPECIFIED CARDIAC ARRHYTHMIAS: ICD-10-CM

## 2024-05-08 DIAGNOSIS — I48.0 PAROXYSMAL ATRIAL FIBRILLATION: ICD-10-CM

## 2024-05-08 PROBLEM — R25.1 TREMOR: Status: RESOLVED | Noted: 2024-02-29 | Resolved: 2024-05-08

## 2024-05-08 LAB
ALBUMIN SERPL BCP-MCNC: 4 G/DL (ref 3.5–5.2)
ALP SERPL-CCNC: 93 U/L (ref 55–135)
ALT SERPL W/O P-5'-P-CCNC: 18 U/L (ref 10–44)
ANION GAP SERPL CALC-SCNC: 8 MMOL/L (ref 8–16)
AST SERPL-CCNC: 23 U/L (ref 10–40)
BASOPHILS # BLD AUTO: 0.05 K/UL (ref 0–0.2)
BASOPHILS NFR BLD: 1.6 % (ref 0–1.9)
BILIRUB SERPL-MCNC: 1.3 MG/DL (ref 0.1–1)
BUN SERPL-MCNC: 8 MG/DL (ref 8–23)
CALCIUM SERPL-MCNC: 9.9 MG/DL (ref 8.7–10.5)
CHLORIDE SERPL-SCNC: 110 MMOL/L (ref 95–110)
CHOLEST SERPL-MCNC: 154 MG/DL (ref 120–199)
CHOLEST/HDLC SERPL: 2.8 {RATIO} (ref 2–5)
CO2 SERPL-SCNC: 24 MMOL/L (ref 23–29)
CREAT SERPL-MCNC: 1 MG/DL (ref 0.5–1.4)
DIFFERENTIAL METHOD BLD: ABNORMAL
EOSINOPHIL # BLD AUTO: 0.1 K/UL (ref 0–0.5)
EOSINOPHIL NFR BLD: 2.2 % (ref 0–8)
ERYTHROCYTE [DISTWIDTH] IN BLOOD BY AUTOMATED COUNT: 13 % (ref 11.5–14.5)
EST. GFR  (NO RACE VARIABLE): >60 ML/MIN/1.73 M^2
ESTIMATED AVG GLUCOSE: 105 MG/DL (ref 68–131)
GLUCOSE SERPL-MCNC: 93 MG/DL (ref 70–110)
HBA1C MFR BLD: 5.3 % (ref 4–5.6)
HCT VFR BLD AUTO: 44.2 % (ref 37–48.5)
HDLC SERPL-MCNC: 56 MG/DL (ref 40–75)
HDLC SERPL: 36.4 % (ref 20–50)
HGB BLD-MCNC: 14.5 G/DL (ref 12–16)
IMM GRANULOCYTES # BLD AUTO: 0.01 K/UL (ref 0–0.04)
IMM GRANULOCYTES NFR BLD AUTO: 0.3 % (ref 0–0.5)
IRON SERPL-MCNC: 112 UG/DL (ref 30–160)
LDLC SERPL CALC-MCNC: 80.6 MG/DL (ref 63–159)
LYMPHOCYTES # BLD AUTO: 0.9 K/UL (ref 1–4.8)
LYMPHOCYTES NFR BLD: 29.1 % (ref 18–48)
MCH RBC QN AUTO: 30.4 PG (ref 27–31)
MCHC RBC AUTO-ENTMCNC: 32.8 G/DL (ref 32–36)
MCV RBC AUTO: 93 FL (ref 82–98)
MONOCYTES # BLD AUTO: 0.3 K/UL (ref 0.3–1)
MONOCYTES NFR BLD: 9.2 % (ref 4–15)
NEUTROPHILS # BLD AUTO: 1.8 K/UL (ref 1.8–7.7)
NEUTROPHILS NFR BLD: 57.6 % (ref 38–73)
NONHDLC SERPL-MCNC: 98 MG/DL
NRBC BLD-RTO: 0 /100 WBC
OHS CV AF BURDEN PERCENT: < 1
OHS CV DC REMOTE DEVICE TYPE: NORMAL
OHS CV ICD SHOCK: NO
PLATELET # BLD AUTO: 174 K/UL (ref 150–450)
PMV BLD AUTO: 11.9 FL (ref 9.2–12.9)
POTASSIUM SERPL-SCNC: 4.3 MMOL/L (ref 3.5–5.1)
PROT SERPL-MCNC: 6.9 G/DL (ref 6–8.4)
RBC # BLD AUTO: 4.77 M/UL (ref 4–5.4)
SATURATED IRON: 33 % (ref 20–50)
SODIUM SERPL-SCNC: 142 MMOL/L (ref 136–145)
TOTAL IRON BINDING CAPACITY: 340 UG/DL (ref 250–450)
TRANSFERRIN SERPL-MCNC: 230 MG/DL (ref 200–375)
TRIGL SERPL-MCNC: 87 MG/DL (ref 30–150)
TSH SERPL DL<=0.005 MIU/L-ACNC: 3.05 UIU/ML (ref 0.4–4)
WBC # BLD AUTO: 3.16 K/UL (ref 3.9–12.7)

## 2024-05-08 PROCEDURE — 80053 COMPREHEN METABOLIC PANEL: CPT | Performed by: NURSE PRACTITIONER

## 2024-05-08 PROCEDURE — G2211 COMPLEX E/M VISIT ADD ON: HCPCS | Mod: S$GLB,,, | Performed by: NURSE PRACTITIONER

## 2024-05-08 PROCEDURE — 83036 HEMOGLOBIN GLYCOSYLATED A1C: CPT | Performed by: NURSE PRACTITIONER

## 2024-05-08 PROCEDURE — 84443 ASSAY THYROID STIM HORMONE: CPT | Performed by: NURSE PRACTITIONER

## 2024-05-08 PROCEDURE — 85025 COMPLETE CBC W/AUTO DIFF WBC: CPT | Performed by: NURSE PRACTITIONER

## 2024-05-08 PROCEDURE — 36415 COLL VENOUS BLD VENIPUNCTURE: CPT | Mod: S$GLB,,, | Performed by: NURSE PRACTITIONER

## 2024-05-08 PROCEDURE — 80061 LIPID PANEL: CPT | Performed by: NURSE PRACTITIONER

## 2024-05-08 PROCEDURE — 99214 OFFICE O/P EST MOD 30 MIN: CPT | Mod: S$GLB,,, | Performed by: NURSE PRACTITIONER

## 2024-05-08 PROCEDURE — 83540 ASSAY OF IRON: CPT | Performed by: NURSE PRACTITIONER

## 2024-05-08 PROCEDURE — 93298 REM INTERROG DEV EVAL SCRMS: CPT | Mod: 26,,, | Performed by: INTERNAL MEDICINE

## 2024-05-08 RX ORDER — PROPRANOLOL HYDROCHLORIDE 20 MG/1
20 TABLET ORAL 3 TIMES DAILY
Qty: 270 TABLET | Refills: 1 | Status: SHIPPED | OUTPATIENT
Start: 2024-05-08 | End: 2024-06-11

## 2024-05-08 RX ORDER — CEFDINIR 300 MG/1
300 CAPSULE ORAL 2 TIMES DAILY
COMMUNITY
End: 2024-05-08

## 2024-05-08 RX ORDER — PROPRANOLOL HYDROCHLORIDE 20 MG/1
20 TABLET ORAL 2 TIMES DAILY
COMMUNITY
Start: 2024-03-03 | End: 2024-05-08 | Stop reason: SDUPTHER

## 2024-05-08 NOTE — PROGRESS NOTES
Subjective:       Patient ID: Tamica Cardona is a 68 y.o. female.    Chief Complaint: Follow-up    Follow-up  Associated symptoms include arthralgias, fatigue and headaches (see HPI). Pertinent negatives include no abdominal pain, chest pain, coughing, fever, nausea or vomiting.     Tamica Cardona is a 67 y.o. year old female patient who has a past medical history of Allergy, Anxiety, Atrial fibrillation, Sleep apnea with Cpap, Breast cancer, right sided mastectomy, Tremor, Depression, History of UTI, Interstitial cystitis, KEMAL, long term use anticoagulants and Neutropenia.     She has been getting a sharp pain type headache to the middle to left side of her posterior head, behind left ear. Onset several months ago. Headache will last about 2 days. Tylenol, Ice does not help. Getting these 2-3 times a month. States pain is sharp 6/10. Nausea.     She is trying to lose weight. States she has been doing fasting, eating only from 9:30 am to 1:30 pm.  Eating healthy high protein diet.     She is followed by Neurology. She has been taking Propranolol for her tremor. States was increased to 40 mg BID but that was too much but 20 not enough. Will change to TID. And see if spreading out the dose will help.This has been helping her anxiety also.     States very sleepy all the time. Falls asleep during the day if she sits still. She is compliant with her use of Cpap. Followed by Sleep medicine. Advised it could be due to her change in diet, medications. She is trying to only eat 800 calories a day.      She is followed by Cardiology for Atrial Fibrillation. She is on Eliquis and Tambocor   most recent clinic note reviewed     She has several chronic issues to review. See ROS    The following portion of the patients history was reviewed and updated as appropriate: allergies, current medications, past medical and surgical history. Past social history and problem list reviewed. Family PMH and Past social history reviewed.  Tobacco, Illicit drug use reviewed.      Review of patient's allergies indicates:   Allergen Reactions    Thimerosal Swelling    Erythromycin base      Other reaction(s): Vomiting  Other reaction(s): Stomach upset  Other reaction(s): Nausea    Penicillins      Other reaction(s): convulsions         Current Outpatient Medications:     acetaminophen (TYLENOL) 500 MG tablet, Take 750 mg by mouth every 6 (six) hours as needed for Pain., Disp: , Rfl:     apixaban (ELIQUIS) 5 mg Tab, Take 1 tablet (5 mg total) by mouth 2 (two) times daily., Disp: 60 tablet, Rfl: 8    ascorbic acid, vitamin C, (VITAMIN C) 500 MG tablet, Take 500 mg by mouth once daily., Disp: , Rfl:     calcium carbonate (OS-DESTINY) 600 mg (1,500 mg) Tab, Take 600 mg by mouth 2 (two) times daily with meals., Disp: , Rfl:     cefdinir (OMNICEF) 300 MG capsule, Take 300 mg by mouth 2 (two) times daily., Disp: , Rfl:     clonazePAM (KLONOPIN) 0.5 MG tablet, Take 1/2 to 1 tablet daily as needed for anxiety, Disp: 30 tablet, Rfl: 0    EScitalopram oxalate (LEXAPRO) 10 MG tablet, Take 1.5 tablets (15 mg total) by mouth once daily., Disp: 180 tablet, Rfl: 3    ferrous gluconate (FERGON) 325 MG tablet, Take 45 mg by mouth daily with breakfast., Disp: , Rfl:     fexofenadine (ALLEGRA ALLERGY) 180 MG tablet, 180 mg., Disp: , Rfl:     flecainide (TAMBOCOR) 100 MG Tab, Take 1 tablet (100 mg total) by mouth every 12 (twelve) hours as needed., Disp: 60 tablet, Rfl: 11    fluticasone propionate (FLONASE) 50 mcg/actuation nasal spray, , Disp: , Rfl:     loratadine (CLARITIN) 10 mg tablet, Take 10 mg by mouth daily as needed. , Disp: , Rfl:     meclizine (ANTIVERT) 25 mg tablet, Take 1 tablet (25 mg total) by mouth 3 (three) times daily as needed., Disp: 30 tablet, Rfl: 1    propranoloL (INDERAL) 20 MG tablet, Take 20 mg by mouth 2 (two) times daily., Disp: , Rfl:     rosuvastatin (CRESTOR) 10 MG tablet, Take 1 tablet (10 mg total) by mouth once daily., Disp: 90 tablet, Rfl:  3    vitamin D (VITAMIN D3) 1000 units Tab, Take 1,000 Units by mouth once daily., Disp: , Rfl:     omega 3-dha-epa-fish oil 500-100-1,000 mg Cap, Take 1 capsule by mouth once daily.  (Patient not taking: Reported on 5/8/2024), Disp: , Rfl:     propranoloL (INDERAL) 40 MG tablet, Take 1 tablet (40 mg total) by mouth 2 (two) times daily. (Patient not taking: Reported on 5/8/2024), Disp: 60 tablet, Rfl: 2    Past Medical History:   Diagnosis Date    Allergy     Anxiety     Atrial fibrillation 10/12/2020    Breast cancer 11/2003    right    Depression     General anesthetics causing adverse effect in therapeutic use     slow to wake-- easily sedated    History of UTI     Interstitial cystitis 2003    Neutropenia     Streptococcal pharyngitis 01/05/2024       Past Surgical History:   Procedure Laterality Date    BREAST BIOPSY Left 2006    benign    CYSTOSCOPY  2003    EYE SURGERY  01/2015    HYSTERECTOMY      TVH, ovaries intact. Uterine prolapse    INSERTION OF IMPLANTABLE LOOP RECORDER Left 02/08/2021    Procedure: Insertion, Implantable Loop Recorder;  Surgeon: Collin Roberts MD;  Location: [facility];  Service: Cardiology;  Laterality: Left;    KNEE ARTHROSCOPY W/ MENISCECTOMY  1992    LT    left breast biopsy  2006    benign    MASTECTOMY, RADICAL Right 12/2003    RETINAL DETACHMENT SURGERY Right        Social History     Socioeconomic History    Marital status:    Tobacco Use    Smoking status: Never    Smokeless tobacco: Never   Substance and Sexual Activity    Alcohol use: No    Drug use: No     Social Determinants of Health     Financial Resource Strain: Low Risk  (2/3/2024)    Overall Financial Resource Strain (CARDIA)     Difficulty of Paying Living Expenses: Not hard at all   Food Insecurity: No Food Insecurity (2/3/2024)    Hunger Vital Sign     Worried About Running Out of Food in the Last Year: Never true     Ran Out of Food in the Last Year: Never true   Transportation Needs: No Transportation  "Needs (2/3/2024)    PRAPARE - Transportation     Lack of Transportation (Medical): No     Lack of Transportation (Non-Medical): No   Physical Activity: Insufficiently Active (2/3/2024)    Exercise Vital Sign     Days of Exercise per Week: 3 days     Minutes of Exercise per Session: 40 min   Stress: No Stress Concern Present (2/3/2024)    [employer] - Occupational Stress Questionnaire     Feeling of Stress : Only a little   Housing Stability: Low Risk  (2/3/2024)    Housing Stability Vital Sign     Unable to Pay for Housing in the Last Year: No     Number of Places Lived in the Last Year: 1     Unstable Housing in the Last Year: No     Review of Systems   Constitutional:  Positive for fatigue. Negative for fever.   HENT: Negative.     Eyes:  Negative for visual disturbance.   Respiratory:  Negative for cough, chest tightness, shortness of breath and wheezing.    Cardiovascular:  Negative for chest pain, palpitations and leg swelling.   Gastrointestinal:  Negative for abdominal pain, blood in stool, diarrhea, nausea and vomiting.   Genitourinary: Negative.    Musculoskeletal:  Positive for arthralgias and back pain. Negative for gait problem.   Skin: Negative.    Neurological:  Positive for headaches (see HPI).   Psychiatric/Behavioral:  Negative for dysphoric mood and sleep disturbance. The patient is not nervous/anxious.        Objective:      /70 (BP Location: Left arm, Patient Position: Sitting, BP Method: Medium (Manual))   Pulse 68   Temp 98.4 °F (36.9 °C)   Ht 5' 5" (1.651 m)   Wt 69.3 kg (152 lb 12.5 oz)   SpO2 95%   BMI 25.42 kg/m²      Physical Exam  Constitutional:       Appearance: Normal appearance. She is normal weight.   HENT:      Head: Normocephalic.   Eyes:      Pupils: Pupils are equal, round, and reactive to light.   Neck:      Thyroid: No thyromegaly.      Vascular: No carotid bruit.   Cardiovascular:      Rate and Rhythm: Normal rate and regular rhythm. "      Pulses: Normal pulses.      Heart sounds: Normal heart sounds. No murmur heard.  Pulmonary:      Effort: Pulmonary effort is normal.      Breath sounds: Normal breath sounds. No wheezing.   Abdominal:      General: Bowel sounds are normal.      Tenderness: There is no abdominal tenderness.   Musculoskeletal:         General: Normal range of motion.      Cervical back: Normal range of motion.      Right lower leg: No edema.      Left lower leg: No edema.      Comments: Gait normal.  strong, equal   Skin:     General: Skin is warm and dry.      Capillary Refill: Capillary refill takes less than 2 seconds.   Neurological:      General: No focal deficit present.      Mental Status: She is alert.   Psychiatric:         Attention and Perception: Attention and perception normal.         Mood and Affect: Mood and affect normal.         Speech: Speech normal.         Behavior: Behavior normal.         Assessment:       1. Primary stabbing headache    2. Encounter for long-term (current) use of medications    3. Iron deficiency anemia, unspecified iron deficiency anemia type    4. Neutropenia, unspecified type    5. Benign familial tremor    6. Anxiety disorder, unspecified type    7. Depression, unspecified depression type    8. Mixed hyperlipidemia    9. Paroxysmal atrial fibrillation    10. History of breast cancer    11. Obstructive sleep apnea syndrome    12. Nuclear sclerosis of both eyes        Plan:       Primary stabbing headache: will schedule CT scan.   -     CT Head Without Contrast; Future; Expected date: 05/08/2024    Encounter for long-term (current) use of medications  -     TSH  -     Lipid Panel  -     Hemoglobin A1C  -     Comprehensive Metabolic Panel  -     CBC Auto Differential    Iron deficiency anemia, unspecified iron deficiency anemia type: She is taking iron supplement.  -     Iron and TIBC    Neutropenia, unspecified type: stable    Benign familial tremor: followed by Neurology. On  Propranolol    Anxiety disorder, unspecified type: stable on Lexapro. Klonopin on prn basis only.    Depression, unspecified depression type: stable on current dose of Lexapro    Mixed hyperlipidemia: Tolerating statin. Good control    Lab Results   Component Value Date    LDLCALC 78.6 04/21/2023        Paroxysmal atrial fibrillation: in NSR at this time. On Finasteride, Eliquis.     History of breast cancer    Obstructive sleep apnea syndrome: compliant with Cpap. Followed by Sleep medicine.    Nuclear sclerosis of both eyes: followed by Optometry on regular basis. Most recent note reviewed.    Other orders  -     propranoloL (INDERAL) 20 MG tablet; Take 1 tablet (20 mg total) by mouth 3 (three) times daily.  Dispense: 270 tablet; Refill: 1       Continue current medication  Take medications only as prescribed  Healthy diet, exercise  Adequate rest  Adequate hydration  Avoid allergens  Avoid excessive caffeine     Follow up 6 months.

## 2024-05-14 ENCOUNTER — PATIENT MESSAGE (OUTPATIENT)
Dept: FAMILY MEDICINE | Facility: CLINIC | Age: 69
End: 2024-05-14
Payer: MEDICARE

## 2024-05-16 LAB
OHS CV AF BURDEN PERCENT: < 1
OHS CV DC REMOTE DEVICE TYPE: NORMAL

## 2024-06-04 ENCOUNTER — OFFICE VISIT (OUTPATIENT)
Dept: OPHTHALMOLOGY | Facility: CLINIC | Age: 69
End: 2024-06-04
Payer: MEDICARE

## 2024-06-04 DIAGNOSIS — H25.13 NUCLEAR SCLEROSIS, BILATERAL: Primary | ICD-10-CM

## 2024-06-04 PROCEDURE — 99204 OFFICE O/P NEW MOD 45 MIN: CPT | Mod: S$PBB,,, | Performed by: OPHTHALMOLOGY

## 2024-06-04 PROCEDURE — 99213 OFFICE O/P EST LOW 20 MIN: CPT | Mod: PBBFAC,25 | Performed by: OPHTHALMOLOGY

## 2024-06-04 PROCEDURE — 99999 PR PBB SHADOW E&M-EST. PATIENT-LVL III: CPT | Mod: PBBFAC,,, | Performed by: OPHTHALMOLOGY

## 2024-06-04 PROCEDURE — 92136 OPHTHALMIC BIOMETRY: CPT | Mod: PBBFAC | Performed by: OPHTHALMOLOGY

## 2024-06-04 RX ORDER — PREDNISOLONE ACETATE-GATIFLOXACIN-BROMFENAC .75; 5; 1 MG/ML; MG/ML; MG/ML
1 SUSPENSION/ DROPS OPHTHALMIC 3 TIMES DAILY
Qty: 5 ML | Refills: 3 | Status: SHIPPED | OUTPATIENT
Start: 2024-06-04

## 2024-06-04 RX ORDER — MOXIFLOXACIN 5 MG/ML
1 SOLUTION/ DROPS OPHTHALMIC
OUTPATIENT
Start: 2024-06-04

## 2024-06-04 RX ORDER — CYCLOP/TROP/PROPA/PHEN/KET/WAT 1-1-0.1%
1 DROPS (EA) OPHTHALMIC (EYE)
OUTPATIENT
Start: 2024-06-04

## 2024-06-04 RX ORDER — SODIUM CHLORIDE 0.9 % (FLUSH) 0.9 %
10 SYRINGE (ML) INJECTION
Status: SHIPPED | OUTPATIENT
Start: 2024-06-04

## 2024-06-04 RX ORDER — PHENYLEPHRINE HYDROCHLORIDE 100 MG/ML
1 SOLUTION/ DROPS OPHTHALMIC
OUTPATIENT
Start: 2024-06-04

## 2024-06-04 NOTE — PROGRESS NOTES
HPI    Patient present today for Cataract eval   Pt state  night driving has become more difficult. Blurry [facility] OU   Pt denies flashes and floaters.  Last edited by Violetta Clement on 6/4/2024  1:00 PM.            Assessment /Plan     For exam results, see Encounter Report.    Nuclear sclerosis, bilateral      Visually Significant Cataract: Patient reports decreased vision consistent with the clinical amount of lenticular opacity, which reaches the level of visual significance and affects activities of daily living.     Specifically, this patient describes difficulty with:  - driving safely at night  - reading road signs  - reading small print  - deciphering medicine bottles  - reading the newspaper  - using the phone  - reading texts     Risks, benefits, and alternatives to cataract surgery were discussed and the consent reviewed. IOL options were discussed, including ATIOLs and the associated side effects and additional patient cost associated with them.   IOL Selections:   Right eye  IOL: CNA0T0 20.0     Left eye  IOL: CNA0T0 21.0    Pt wishes to have RIGHT eye done first.  After discussion of refractive cataract surgery options, patient has chosen traditional manual surgery and is satisfied with wearing bifocal glasses if necessary after surgery.

## 2024-06-08 ENCOUNTER — HOSPITAL ENCOUNTER (OUTPATIENT)
Dept: CARDIOLOGY | Facility: HOSPITAL | Age: 69
Discharge: HOME | End: 2024-06-08
Attending: INTERNAL MEDICINE

## 2024-06-08 ENCOUNTER — CLINICAL SUPPORT (OUTPATIENT)
Dept: CARDIOLOGY | Facility: HOSPITAL | Age: 69
End: 2024-06-08
Payer: MEDICARE

## 2024-06-08 DIAGNOSIS — I49.8 OTHER SPECIFIED CARDIAC ARRHYTHMIAS: ICD-10-CM

## 2024-06-08 PROCEDURE — 93298 REM INTERROG DEV EVAL SCRMS: CPT | Mod: 26,,, | Performed by: INTERNAL MEDICINE

## 2024-06-10 NOTE — PROGRESS NOTES
[facility]  Date: 6/11/24    Patient: Tamica Cardona, 1955, [id number]  Primary Care Provider: Tamica Kent NP     Chief Complaint: Follow up     Subjective:       Tamica Cardona is a 68 y.o. female who presents for follow up. They follow with Dr. Roberts.    CBC, CMP, lipid panel okay. Switched toprol-XL 12.5 mg qd to propranolol 20 mg BID to help with tremors. Has not required use of flecainide. Few episodes of a fib with palpitations occurring once every 2 weeks. No significant change in frequency or severity over the last several years. Denies chest discomfort, dyspnea, syncope, orthopnea, PND, edema, pathological bleeding, falls.    Focused Past History includes:   Hyperlipidemia  Paroxysmal atrial fibrillation  ILR device check in 5/2024: NSR, 0.8% AF with -180 bpm  TTE 1/2024: LVEF 65%. Normal LA, RA. Trace AR, trace MR, trace TR. PASP 22.   Tachy-steve syndrome   YURI on CPAP    Current Outpatient Medications   Medication Sig    apixaban (ELIQUIS) 5 mg Tab Take 1 tablet (5 mg total) by mouth 2 (two) times daily.    calcium carbonate (OS-DESTINY) 600 mg (1,500 mg) Tab Take 600 mg by mouth 2 (two) times daily with meals.    EScitalopram oxalate (LEXAPRO) 10 MG tablet Take 1.5 tablets (15 mg total) by mouth once daily.    ferrous gluconate (FERGON) 325 MG tablet Take 45 mg by mouth daily with breakfast.    fexofenadine (ALLEGRA ALLERGY) 180 MG tablet 180 mg.    fluticasone propionate (FLONASE) 50 mcg/actuation nasal spray     loratadine (CLARITIN) 10 mg tablet Take 10 mg by mouth daily as needed.     rosuvastatin (CRESTOR) 10 MG tablet Take 1 tablet (10 mg total) by mouth once daily.    vitamin D (VITAMIN D3) 1000 units Tab Take 1,000 Units by mouth once daily.    acetaminophen (TYLENOL) 500 MG tablet Take 750 mg by mouth every 6 (six) hours as needed for Pain. (Patient not taking: Reported on 6/11/2024)    ascorbic acid, vitamin C, (VITAMIN C) 500 MG tablet  Take 500 mg by mouth once daily. (Patient not taking: Reported on 6/11/2024)    clonazePAM (KLONOPIN) 0.5 MG tablet Take 1/2 to 1 tablet daily as needed for anxiety (Patient not taking: Reported on 6/11/2024)    flecainide (TAMBOCOR) 100 MG Tab Take 1 tablet (100 mg total) by mouth every 12 (twelve) hours as needed. (Patient not taking: Reported on 6/11/2024)    meclizine (ANTIVERT) 25 mg tablet Take 1 tablet (25 mg total) by mouth 3 (three) times daily as needed. (Patient not taking: Reported on 6/11/2024)    prednisolon/gatiflox/bromfenac (PREDNISOL ACE-GATIFLOX-BROMFEN) 1-0.5-0.075 % DrpS Apply 1 drop to eye 3 (three) times daily. in operative eye for 1 month after surgery (Patient not taking: Reported on 6/11/2024)    propranoloL (INDERAL) 20 MG tablet Take 1 tablet (20 mg total) by mouth 2 (two) times daily.     Current Facility-Administered Medications   Medication    sodium chloride 0.9% flush 10 mL            Objective       Review of Systems  Constitutional: negative for fevers, night sweats, and weight loss  Eyes: negative for visual disturbance, diplopia  Respiratory: negative for cough, hemoptysis, sputum, and wheezing  Cardiovascular: see HPI  Gastrointestinal: negative for abdominal pain, bright red blood per rectum, change in bowel habits, dysphagia, melena, and reflux symptoms  Genitourinary:negative for dysuria, frequency, and hematuria  Hematologic/lymphatic: negative for bleeding, easy bruising, and lymphadenopathy  Musculoskeletal:negative for arthralgias, back pain, and myalgias  Neurological: negative for gait problems, paresthesia, speech problems, vertigo, and weakness  Behavioral/Psych: negative for excessive alcohol consumption, illegal drug usage, and sleep disturbance    -------------------------------------    Allergy    Anxiety    Atrial fibrillation    Breast cancer    right    Depression    General anesthetics causing adverse effect in therapeutic use    slow to wake-- easily sedated  "   History of UTI    Interstitial cystitis    Neutropenia    Streptococcal pharyngitis     ----------------------------    Breast biopsy    benign    Cystoscopy    Eye surgery    Hysterectomy    TVH, ovaries intact. Uterine prolapse    Insertion of implantable loop recorder    Procedure: Insertion, Implantable Loop Recorder;  Surgeon: Collin Roberts MD;  Location: [facility];  Service: Cardiology;  Laterality: Left;    Knee arthroscopy w/ meniscectomy    LT    Left breast biopsy    benign    Mastectomy, radical    Retinal detachment surgery        Family History   Problem Relation Name Age of Onset    Cancer Father          bladder, prostate, lung cancer    Glaucoma Father      Cataracts Father      Macular degeneration Father      No Known Problems Mother      Cataracts Brother      Retinal detachment Brother      Glaucoma Brother      No Known Problems Sister      No Known Problems Maternal Aunt      No Known Problems Maternal Uncle      No Known Problems Paternal Aunt      No Known Problems Paternal Uncle      No Known Problems Maternal Grandmother      No Known Problems Maternal Grandfather      No Known Problems Paternal Grandmother      No Known Problems Paternal Grandfather      Urolithiasis Neg Hx      Kidney cancer Neg Hx      Amblyopia Neg Hx      Blindness Neg Hx      Diabetes Neg Hx      Hypertension Neg Hx      Strabismus Neg Hx      Stroke Neg Hx      Thyroid disease Neg Hx      Melanoma Neg Hx       Social History     Tobacco Use    Smoking status: Never    Smokeless tobacco: Never   Substance Use Topics    Alcohol use: No    Drug use: No       Physical Exam  /65 (BP Location: Left arm, Patient Position: Sitting, BP Method: Medium (Automatic))   Pulse 60   Ht 5' 5" (1.651 m)   Wt 71 kg (156 lb 8.4 oz)   BMI 26.05 kg/m²   Body surface area is 1.8 meters squared.  Body mass index is 26.05 kg/m².    General appearance: alert, appears stated age, cooperative, and no distress  Head: " Normocephalic, without obvious abnormality, atraumatic  Neck: no carotid bruit, no JVD, and supple, symmetrical, trachea midline  Lungs: clear to auscultation bilaterally  Heart: regular rate and rhythm; S1, S2 normal, no murmur, click, rub or gallop  Abdomen: soft, non-tender, no distended  Extremities: extremities atraumatic, no pitting edema  Skin: warm, no cyanosis, no pathologic ecchymosis in exposed portions  Neurologic: Grossly normal. A&O x3      Lab Review   Lab Results   Component Value Date    WBC 3.16 (L) 05/08/2024    HGB 14.5 05/08/2024    HCT 44.2 05/08/2024    MCV 93 05/08/2024     05/08/2024         BMP  Lab Results   Component Value Date     05/08/2024    K 4.3 05/08/2024     05/08/2024    CO2 24 05/08/2024    BUN 8 05/08/2024    CREATININE 1.0 05/08/2024    CALCIUM 9.9 05/08/2024    ANIONGAP 8 05/08/2024    ESTGFRAFRICA >60.0 10/19/2021    EGFRNONAA 59.3 (A) 10/19/2021       Lab Results   Component Value Date    LABPROT 11.2 10/08/2006    ALBUMIN 4.0 05/08/2024       Lab Results   Component Value Date    ALT 18 05/08/2024    AST 23 05/08/2024    ALKPHOS 93 05/08/2024    BILITOT 1.3 (H) 05/08/2024       Lab Results   Component Value Date    TSH 3.049 05/08/2024       Lab Results   Component Value Date    CHOL 154 05/08/2024    CHOL 142 04/21/2023    CHOL 268 (H) 10/10/2022     Lab Results   Component Value Date    HDL 56 05/08/2024    HDL 49 04/21/2023    HDL 56 10/10/2022     Lab Results   Component Value Date    LDLCALC 80.6 05/08/2024    LDLCALC 78.6 04/21/2023    LDLCALC 187.2 (H) 10/10/2022     Lab Results   Component Value Date    TRIG 87 05/08/2024    TRIG 72 04/21/2023    TRIG 124 10/10/2022     Lab Results   Component Value Date    CHOLHDL 36.4 05/08/2024    CHOLHDL 34.5 04/21/2023    CHOLHDL 20.9 10/10/2022                Assessment & Plan:     This is a 68 y.o. female with PAF, HLD. No active cardiac symptoms.    Paroxysmal atrial fibrillation  - Rate controlled   -  Continue propranolol 20 mg BID   - Continue eliquis 5 mg BID    Hyperlipidemia   - Continue crestor 10 mg qd    Tachy-steve syndrome  - ILR with underlying NSR    Emphasized the importance of modifying lifestyle related risk factors including exercise and diet most resembling a Mediterranean diet.    Please follow up with me in 6-9 months.      [facility] [place]  Office: [phone]

## 2024-06-11 ENCOUNTER — OFFICE VISIT (OUTPATIENT)
Dept: CARDIOLOGY | Facility: CLINIC | Age: 69
End: 2024-06-11
Payer: MEDICARE

## 2024-06-11 VITALS
HEART RATE: 60 BPM | BODY MASS INDEX: 26.08 KG/M2 | DIASTOLIC BLOOD PRESSURE: 65 MMHG | HEIGHT: 65 IN | SYSTOLIC BLOOD PRESSURE: 108 MMHG | WEIGHT: 156.5 LBS

## 2024-06-11 DIAGNOSIS — E78.2 MIXED HYPERLIPIDEMIA: ICD-10-CM

## 2024-06-11 DIAGNOSIS — I49.5 TACHY-BRADY SYNDROME: ICD-10-CM

## 2024-06-11 DIAGNOSIS — I48.0 PAROXYSMAL ATRIAL FIBRILLATION: Primary | ICD-10-CM

## 2024-06-11 PROCEDURE — 99214 OFFICE O/P EST MOD 30 MIN: CPT | Mod: S$PBB,,,

## 2024-06-11 PROCEDURE — 99999 PR PBB SHADOW E&M-EST. PATIENT-LVL III: CPT | Mod: PBBFAC,,,

## 2024-06-11 PROCEDURE — 99213 OFFICE O/P EST LOW 20 MIN: CPT | Mod: PBBFAC,PO

## 2024-06-11 RX ORDER — PROPRANOLOL HYDROCHLORIDE 20 MG/1
20 TABLET ORAL 2 TIMES DAILY
Qty: 270 TABLET | Refills: 1 | Status: SHIPPED | OUTPATIENT
Start: 2024-06-11

## 2024-06-12 LAB
OHS CV AF BURDEN PERCENT: 1.2
OHS CV DC REMOTE DEVICE TYPE: NORMAL
OHS CV ICD SHOCK: NO

## 2024-06-25 ENCOUNTER — TELEPHONE (OUTPATIENT)
Dept: OPHTHALMOLOGY | Facility: CLINIC | Age: 69
End: 2024-06-25
Payer: MEDICARE

## 2024-06-25 DIAGNOSIS — H25.11 NUCLEAR SCLEROTIC CATARACT OF RIGHT EYE: Primary | ICD-10-CM

## 2024-07-06 DIAGNOSIS — I48.0 PAROXYSMAL ATRIAL FIBRILLATION: ICD-10-CM

## 2024-07-08 RX ORDER — APIXABAN 5 MG/1
5 TABLET, FILM COATED ORAL 2 TIMES DAILY
Qty: 60 TABLET | Refills: 6 | Status: SHIPPED | OUTPATIENT
Start: 2024-07-08

## 2024-07-09 ENCOUNTER — HOSPITAL ENCOUNTER (OUTPATIENT)
Dept: CARDIOLOGY | Facility: HOSPITAL | Age: 69
Discharge: HOME | End: 2024-07-09
Attending: INTERNAL MEDICINE

## 2024-07-09 ENCOUNTER — CLINICAL SUPPORT (OUTPATIENT)
Dept: CARDIOLOGY | Facility: HOSPITAL | Age: 69
End: 2024-07-09
Payer: MEDICARE

## 2024-07-09 DIAGNOSIS — I49.8 OTHER SPECIFIED CARDIAC ARRHYTHMIAS: ICD-10-CM

## 2024-07-09 PROCEDURE — 93298 REM INTERROG DEV EVAL SCRMS: CPT | Mod: 26,,, | Performed by: INTERNAL MEDICINE

## 2024-07-10 ENCOUNTER — TELEPHONE (OUTPATIENT)
Dept: CARDIOLOGY | Facility: HOSPITAL | Age: 69
End: 2024-07-10
Payer: MEDICARE

## 2024-07-10 LAB
OHS CV AF BURDEN PERCENT: 2.7
OHS CV DC REMOTE DEVICE TYPE: NORMAL
OHS CV ICD SHOCK: NO

## 2024-07-10 NOTE — TELEPHONE ENCOUNTER
AF w/ RVR noted during ILR device remote check:    Overall burden: 2.7% 6/8-7/9    Max duration seen: 3 hrs. 28 mins. On 6/29 median V rate 115 bpm          Most recent episode: 7/1    Ventricular rates: Needs improvement      V rates not in AF:      Meds:  Eliquis 5 mg bid  Propranolol 20 mg bid  Flecainide 100 mg prn      Anticoagulation status:  Eliquis    Patient symptoms: She reports she was sleeping, so she does not recall. She said one day 1-2 weeks ago she took her prn Flecainide  Pt. happy with current medical management.           Message sent to Dr Boland for review

## 2024-07-17 ENCOUNTER — HOSPITAL ENCOUNTER (OUTPATIENT)
Dept: CARDIOLOGY | Facility: HOSPITAL | Age: 69
Discharge: HOME | End: 2024-07-17
Attending: INTERNAL MEDICINE

## 2024-07-17 ENCOUNTER — TELEPHONE (OUTPATIENT)
Dept: CARDIOLOGY | Facility: HOSPITAL | Age: 69
End: 2024-07-17
Payer: MEDICARE

## 2024-07-17 NOTE — TELEPHONE ENCOUNTER
Nocturnal Atrial fibrillation with RVR noted during device remote check:    Overall burden:  4.6% 7/10-7/17    Max duration seen: multiple nocturnal events noted 7/15 at 23:23 until 7/16 ending ~ 7am, longest single event 4hr 6sec MVR 118bpm      Ventricular rates:   Controlled        Anticoagulation status:  Eliquis 5mg BID  Propranolol 20mg BID  Flecainide 100mg PRN

## 2024-08-05 ENCOUNTER — TELEPHONE (OUTPATIENT)
Dept: OPHTHALMOLOGY | Facility: CLINIC | Age: 69
End: 2024-08-05
Payer: MEDICARE

## 2024-08-05 ENCOUNTER — PATIENT MESSAGE (OUTPATIENT)
Dept: OPHTHALMOLOGY | Facility: CLINIC | Age: 69
End: 2024-08-05
Payer: MEDICARE

## 2024-08-07 NOTE — PRE-PROCEDURE INSTRUCTIONS
Patient reviewed on 8/6/2024.  Okay to proceed at [place]. The following pre-procedure instructions and arrival time have been reviewed with patient via phone and sent to patient portal for review.  Patient verbalized an understanding.  Pt to be accompanied by Bryanna day of procedure as responsible .      Dear Tamica     Below you will find basic pre-procedure instructions in preparation for your procedure on 8/8/24 with Dr. Loco     You should have received your arrival time already from Dr's office.     - Nothing to eat or drink after midnight the night before your procedure until after your procedure, except AM meds with small sips of water.     - HOLD all oral Diabetic medications night before and morning of procedure  - HOLD all Insulin morning of procedure  - HOLD all Fluid pills morning of procedure  - HOLD all non-insulin shots until after surgery (Ozempic, Mounjaro, Trulicity, Victoza, Byetta, Wegovy and Adlyxin) (7 days prior)  - HOLD all vitamins, minerals and herbal supplements morning of procedure   - TAKE all B/P meds, EXCEPT those that contain a fluid pill (ex. Lasix, Hydroclorothiazide/HCTZ, Spirnolactone)  - USE inhalers as needed and bring AM of surgery  - USE EYE DROPS as directed  -TAKE blood thinner meds AM of surgery unless otherwise instructed by your provider to not take     - Shower and wash face with antibacterial soap (ex. Dial) for 3 mins PM prior and AM of surgery  - No powder, lotions, creams, oils, gels, ointments, makeup,  or jewelry    - Wear comfortable clothing (button up shirt)     (Patient is required to have a responsible ride to transport home, ride may not leave while patient is in surgery)     -- [facility] 2nd floor Surgery Center, located   @ [address] [place] [id number]  2nd Floor Registration        If you have any questions or concerns please feel free to contact your surgeon's office.           Please reply to this  message as receipt of delivery.     [place], LPN [facility]  Pre-Admit - Anesthesia Dept

## 2024-08-08 ENCOUNTER — HOSPITAL ENCOUNTER (OUTPATIENT)
Facility: HOSPITAL | Age: 69
Discharge: HOME | End: 2024-08-08
Attending: OPHTHALMOLOGY | Admitting: OPHTHALMOLOGY
Payer: MEDICARE

## 2024-08-08 VITALS
HEIGHT: 65 IN | BODY MASS INDEX: 25.33 KG/M2 | SYSTOLIC BLOOD PRESSURE: 127 MMHG | DIASTOLIC BLOOD PRESSURE: 60 MMHG | HEART RATE: 57 BPM | OXYGEN SATURATION: 96 % | RESPIRATION RATE: 27 BRPM | TEMPERATURE: 98 F | WEIGHT: 152 LBS

## 2024-08-08 DIAGNOSIS — H25.13 NUCLEAR SCLEROSIS, BILATERAL: ICD-10-CM

## 2024-08-08 DIAGNOSIS — H25.13 NUCLEAR SCLEROSIS OF BOTH EYES: Primary | ICD-10-CM

## 2024-08-08 DIAGNOSIS — H25.11 NUCLEAR SCLEROTIC CATARACT OF RIGHT EYE: ICD-10-CM

## 2024-08-08 PROCEDURE — 36000707: Performed by: OPHTHALMOLOGY

## 2024-08-08 PROCEDURE — V2632 POST CHMBR INTRAOCULAR LENS: HCPCS | Performed by: OPHTHALMOLOGY

## 2024-08-08 PROCEDURE — 36000706: Performed by: OPHTHALMOLOGY

## 2024-08-08 PROCEDURE — 94761 N-INVAS EAR/PLS OXIMETRY MLT: CPT

## 2024-08-08 PROCEDURE — 99900035 HC TECH TIME PER 15 MIN (STAT)

## 2024-08-08 PROCEDURE — 99153 MOD SED SAME PHYS/QHP EA: CPT | Performed by: OPHTHALMOLOGY

## 2024-08-08 PROCEDURE — 63600175 PHARM REV CODE 636 W HCPCS: Performed by: OPHTHALMOLOGY

## 2024-08-08 PROCEDURE — 25000003 PHARM REV CODE 250: Performed by: OPHTHALMOLOGY

## 2024-08-08 PROCEDURE — 71000015 HC POSTOP RECOV 1ST HR: Performed by: OPHTHALMOLOGY

## 2024-08-08 PROCEDURE — 99152 MOD SED SAME PHYS/QHP 5/>YRS: CPT | Performed by: OPHTHALMOLOGY

## 2024-08-08 PROCEDURE — 66984 XCAPSL CTRC RMVL W/O ECP: CPT | Mod: RT,,, | Performed by: OPHTHALMOLOGY

## 2024-08-08 PROCEDURE — 99152 MOD SED SAME PHYS/QHP 5/>YRS: CPT | Mod: ,,, | Performed by: OPHTHALMOLOGY

## 2024-08-08 DEVICE — LENS CLAREON AUTONOME 20.0D: Type: IMPLANTABLE DEVICE | Site: EYE | Status: FUNCTIONAL

## 2024-08-08 RX ORDER — PROPARACAINE HYDROCHLORIDE 5 MG/ML
SOLUTION/ DROPS OPHTHALMIC
Status: DISCONTINUED | OUTPATIENT
Start: 2024-08-08 | End: 2024-08-08 | Stop reason: HOSPADM

## 2024-08-08 RX ORDER — CYCLOP/TROP/PROPA/PHEN/KET/WAT 1-1-0.1%
1 DROPS (EA) OPHTHALMIC (EYE)
Status: COMPLETED | OUTPATIENT
Start: 2024-08-08 | End: 2024-08-08

## 2024-08-08 RX ORDER — MIDAZOLAM HYDROCHLORIDE 1 MG/ML
2 INJECTION, SOLUTION INTRAMUSCULAR; INTRAVENOUS
Status: DISCONTINUED | OUTPATIENT
Start: 2024-08-08 | End: 2024-08-08

## 2024-08-08 RX ORDER — MOXIFLOXACIN 5 MG/ML
1 SOLUTION/ DROPS OPHTHALMIC
Status: COMPLETED | OUTPATIENT
Start: 2024-08-08 | End: 2024-08-08

## 2024-08-08 RX ORDER — LIDOCAINE HYDROCHLORIDE 40 MG/ML
INJECTION, SOLUTION RETROBULBAR
Status: DISCONTINUED | OUTPATIENT
Start: 2024-08-08 | End: 2024-08-08 | Stop reason: HOSPADM

## 2024-08-08 RX ORDER — CYCLOP/TROP/PROPA/PHEN/KET/WAT 1-1-0.1%
1 DROPS (EA) OPHTHALMIC (EYE)
Status: DISCONTINUED | OUTPATIENT
Start: 2024-08-08 | End: 2024-08-08

## 2024-08-08 RX ORDER — MOXIFLOXACIN 5 MG/ML
SOLUTION/ DROPS OPHTHALMIC
Status: DISCONTINUED | OUTPATIENT
Start: 2024-08-08 | End: 2024-08-08 | Stop reason: HOSPADM

## 2024-08-08 RX ORDER — ACETAMINOPHEN 325 MG/1
650 TABLET ORAL EVERY 4 HOURS PRN
Status: DISCONTINUED | OUTPATIENT
Start: 2024-08-08 | End: 2024-08-08 | Stop reason: HOSPADM

## 2024-08-08 RX ORDER — MOXIFLOXACIN 5 MG/ML
1 SOLUTION/ DROPS OPHTHALMIC
Status: DISCONTINUED | OUTPATIENT
Start: 2024-08-08 | End: 2024-08-08

## 2024-08-08 RX ORDER — PROPARACAINE HYDROCHLORIDE 5 MG/ML
1 SOLUTION/ DROPS OPHTHALMIC
Status: DISCONTINUED | OUTPATIENT
Start: 2024-08-08 | End: 2024-08-08 | Stop reason: HOSPADM

## 2024-08-08 RX ORDER — PHENYLEPHRINE HYDROCHLORIDE 100 MG/ML
1 SOLUTION/ DROPS OPHTHALMIC
Status: DISCONTINUED | OUTPATIENT
Start: 2024-08-08 | End: 2024-08-08 | Stop reason: HOSPADM

## 2024-08-08 RX ORDER — MIDAZOLAM HYDROCHLORIDE 1 MG/ML
1 INJECTION, SOLUTION INTRAMUSCULAR; INTRAVENOUS
Status: DISCONTINUED | OUTPATIENT
Start: 2024-08-08 | End: 2024-08-08 | Stop reason: HOSPADM

## 2024-08-08 RX ORDER — MOXIFLOXACIN 5 MG/ML
1 SOLUTION/ DROPS OPHTHALMIC 3 TIMES DAILY
Status: COMPLETED | OUTPATIENT
Start: 2024-08-08 | End: 2024-08-08

## 2024-08-08 RX ADMIN — MOXIFLOXACIN OPHTHALMIC 1 DROP: 5 SOLUTION/ DROPS OPHTHALMIC at 08:08

## 2024-08-08 RX ADMIN — MIDAZOLAM HYDROCHLORIDE 1 MG: 1 INJECTION, SOLUTION INTRAMUSCULAR; INTRAVENOUS at 09:08

## 2024-08-08 RX ADMIN — Medication 1 DROP: at 08:08

## 2024-08-08 RX ADMIN — MIDAZOLAM HYDROCHLORIDE 2 MG: 1 INJECTION, SOLUTION INTRAMUSCULAR; INTRAVENOUS at 09:08

## 2024-08-08 RX ADMIN — MOXIFLOXACIN 1 DROP: 5 SOLUTION/ DROPS OPHTHALMIC at 10:08

## 2024-08-08 NOTE — DISCHARGE INSTRUCTIONS
CATARACT SURGERY    POST-OPERATIVE INSTRUCTIONS    · Apply drops THREE times a day into operative eye for 30 days.    · DO NOT rub your eye    · Wear protective sunglasses during the day    · Resume moderate activity    · Bathe/shower/wash face normally    · DO NOT apply makeup around the operative eye for 1 week.         You should expect    - Blurry vision and halos for 24-48 hours    - Dilated pupil for 24-48 hours    - Scratchy feeling in the eye for 1-2 days    - Curved shadow in your peripheral vision for 2-3 weeks    - Occasional flickering of lights for up to 1 week    -If you experience severe pain or nausea, please call Dr Loco or the on-call doctor at [phone]    - Plan to see Dr Loco tomorrow .      [facility]    [address] [address]    ** Most patients can drive the next day, but if you do not feel comfortable driving, please arrange for transportation.

## 2024-08-08 NOTE — DISCHARGE SUMMARY
Outcome: Successful outpatient ophthalmic surgical procedure  Preprinted Instructions given to patient.  Regular diet.  Activity: No restrictions  Meds: see Med Rec  Condition: stable  Follow up: 1 day with Dr Loco  Disposition: Home  Diagnosis: s/p eye surgery  Date of discharge: 08/08/2024

## 2024-08-08 NOTE — OP NOTE
SURGEON:  Nik Loco M.D.    PREOPERATIVE DIAGNOSIS:    Nuclear Sclerotic Cataract Right Eye    POSTOPERATIVE DIAGNOSIS:    Nuclear Sclerotic Cataract Right Eye    PROCEDURES:    Phacoemulsification with  intraocular lens, Right eye (03581)  With moderate sedation >10min (68636)    DATE OF SURGERY: 08/08/2024    IMPLANT: CNA0T0 20.0    ANESTHESIA:  Under my direct supervision, intravenous moderate sedation was administered during the course of this procedure, with continuous monitoring of hemodynamic parameters. Total time of sedation and amount of sedatives are documented in the nursing logs.    COMPLICATIONS:  None    ESTIMATED BLOOD LOSS: None    SPECIMENS: None    INDICATIONS:    The patient has a history of painless progressive visual loss and difficulty with activities of daily living, which specifically include difficult driving at night due to glare and difficulty reading small print, secondary to cataract formation.  After a thorough discussion of the risks, benefits, and alternatives to cataract surgery, including, but not limited to, the rare risks of infection, retinal detachment, hemorrhage, need for additional surgery, loss of vision, and even loss of the eye, the patient voices understanding and desires to proceed.    DESCRIPTION OF PROCEDURE:    The patients IOL calculations were reviewed, and the lens selection confirmed.   After verification and marking of the proper eye in the preop holding area, the patient was brought to the operating room in supine position where the eye was prepped and draped in standard sterile fashion with 5% Betadine and a lid speculum placed in the eye.   Topical 4% Lidocaine was used in addition to the preoperative anesthesia and the procedure was begun by the creation of a paracentesis incision through which viscoelastic was used to fill the anterior chamber.  Next, a keratome blade was used to create a triplanar temporal clear corneal incision and a cystotome and  Berniceata forceps used to fashion a continuous curvilinear capsulorrhexis.  Hydrodissection was carried out using the Keys hydrodissection cannula and the nucleus was found to be mobile.  Phacoemulsification of the nucleus was carried out using a quick chop technique, and all remaining epinuclear and cortical material was removed.  The eye was then reformed with Viscoelastic and the  intraocular lens was implanted into the capsular bag.  All remaining viscoelastics were removed from the eye and at the end of the case the pupil was round, the lens was well-centered within the capsular bag and all wounds were found to be water tight.  Drops of Vigamox and Pred Forte were instilled and a shield was placed over the eye. The patient will follow up with Dr. Loco in the morning.

## 2024-08-09 ENCOUNTER — HOSPITAL ENCOUNTER (OUTPATIENT)
Dept: CARDIOLOGY | Facility: HOSPITAL | Age: 69
Discharge: HOME | End: 2024-08-09
Attending: INTERNAL MEDICINE

## 2024-08-09 ENCOUNTER — OFFICE VISIT (OUTPATIENT)
Dept: OPHTHALMOLOGY | Facility: CLINIC | Age: 69
End: 2024-08-09
Payer: MEDICARE

## 2024-08-09 ENCOUNTER — CLINICAL SUPPORT (OUTPATIENT)
Dept: CARDIOLOGY | Facility: HOSPITAL | Age: 69
End: 2024-08-09
Payer: MEDICARE

## 2024-08-09 DIAGNOSIS — I49.8 OTHER SPECIFIED CARDIAC ARRHYTHMIAS: ICD-10-CM

## 2024-08-09 DIAGNOSIS — H25.13 NUCLEAR SCLEROSIS, BILATERAL: Primary | ICD-10-CM

## 2024-08-09 DIAGNOSIS — Z98.890 POST-OPERATIVE STATE: ICD-10-CM

## 2024-08-09 PROCEDURE — 99213 OFFICE O/P EST LOW 20 MIN: CPT | Mod: PBBFAC | Performed by: OPHTHALMOLOGY

## 2024-08-09 PROCEDURE — 99999 PR PBB SHADOW E&M-EST. PATIENT-LVL III: CPT | Mod: PBBFAC,,, | Performed by: OPHTHALMOLOGY

## 2024-08-09 PROCEDURE — 93298 REM INTERROG DEV EVAL SCRMS: CPT | Mod: 26,,, | Performed by: INTERNAL MEDICINE

## 2024-08-15 LAB
OHS CV AF BURDEN PERCENT: 1.2
OHS CV DC REMOTE DEVICE TYPE: NORMAL
OHS CV ICD SHOCK: NO

## 2024-08-16 ENCOUNTER — OFFICE VISIT (OUTPATIENT)
Dept: OPHTHALMOLOGY | Facility: CLINIC | Age: 69
End: 2024-08-16
Payer: MEDICARE

## 2024-08-16 DIAGNOSIS — Z98.890 POST-OPERATIVE STATE: ICD-10-CM

## 2024-08-16 DIAGNOSIS — H25.13 NUCLEAR SCLEROSIS, BILATERAL: Primary | ICD-10-CM

## 2024-08-16 PROCEDURE — 99999 PR PBB SHADOW E&M-EST. PATIENT-LVL II: CPT | Mod: PBBFAC,,, | Performed by: OPHTHALMOLOGY

## 2024-08-16 PROCEDURE — 99212 OFFICE O/P EST SF 10 MIN: CPT | Mod: PBBFAC | Performed by: OPHTHALMOLOGY

## 2024-08-16 NOTE — PROGRESS NOTES
HPI    S/p phaco w/IOL OD 8/8/24    PGB TID OD    Pt here for 1 week post op OD.  Pt states doing well. Pt denies eye pain   OD.  Last edited by Bryanna Humphrey MA on 8/16/2024 10:05 AM.            Assessment /Plan     For exam results, see Encounter Report.    Nuclear sclerosis, bilateral    Post-operative state      Slit lamp exam:  L/L: nl  K: clear, wound sealed  AC: trace cell  Iris/Lens: IOL centered and stable    POW1 s/p phaco: Surgery healing well with no signs of infection or abnormal inflammation.    Patient wishes to proceed with surgery in the second eye. Risks, benefits, alternatives reviewed. IOL selection reviewed.     Left eye  IOL: CNA0T0 21.0      After discussion of refractive cataract surgery options, patient has chosen traditional manual surgery and is satisfied with wearing bifocal glasses if necessary after surgery.

## 2024-09-03 ENCOUNTER — HOSPITAL ENCOUNTER (OUTPATIENT)
Dept: CARDIOLOGY | Facility: HOSPITAL | Age: 69
Discharge: HOME | End: 2024-09-03
Attending: INTERNAL MEDICINE
Payer: MEDICARE

## 2024-09-09 ENCOUNTER — HOSPITAL ENCOUNTER (OUTPATIENT)
Dept: CARDIOLOGY | Facility: HOSPITAL | Age: 69
Discharge: HOME | End: 2024-09-09
Attending: INTERNAL MEDICINE
Payer: MEDICARE

## 2024-09-09 ENCOUNTER — TELEPHONE (OUTPATIENT)
Dept: OPHTHALMOLOGY | Facility: CLINIC | Age: 69
End: 2024-09-09
Payer: MEDICARE

## 2024-09-09 ENCOUNTER — CLINICAL SUPPORT (OUTPATIENT)
Dept: CARDIOLOGY | Facility: HOSPITAL | Age: 69
End: 2024-09-09
Payer: MEDICARE

## 2024-09-09 DIAGNOSIS — I49.8 OTHER SPECIFIED CARDIAC ARRHYTHMIAS: ICD-10-CM

## 2024-09-09 PROCEDURE — 93298 REM INTERROG DEV EVAL SCRMS: CPT | Mod: 26,,, | Performed by: INTERNAL MEDICINE

## 2024-09-09 NOTE — TELEPHONE ENCOUNTER
Left pt message asking to call back. Pt had Cataract sx with Dr. Loco a month ago and called to cancel 1 mo post op.

## 2024-09-13 LAB
OHS CV AF BURDEN PERCENT: < 1
OHS CV DC REMOTE DEVICE TYPE: NORMAL
OHS CV ICD SHOCK: NO

## 2024-09-16 ENCOUNTER — OFFICE VISIT (OUTPATIENT)
Dept: OPTOMETRY | Facility: CLINIC | Age: 69
End: 2024-09-16
Payer: MEDICARE

## 2024-09-16 DIAGNOSIS — H43.813 POSTERIOR VITREOUS DETACHMENT, BILATERAL: ICD-10-CM

## 2024-09-16 DIAGNOSIS — Z96.1 S/P CATARACT EXTRACTION AND INSERTION OF INTRAOCULAR LENS, RIGHT: Primary | ICD-10-CM

## 2024-09-16 DIAGNOSIS — Z98.41 S/P CATARACT EXTRACTION AND INSERTION OF INTRAOCULAR LENS, RIGHT: Primary | ICD-10-CM

## 2024-09-16 PROCEDURE — 99024 POSTOP FOLLOW-UP VISIT: CPT | Mod: POP,,, | Performed by: OPTOMETRIST

## 2024-09-16 PROCEDURE — 99213 OFFICE O/P EST LOW 20 MIN: CPT | Mod: PBBFAC,PO | Performed by: OPTOMETRIST

## 2024-09-16 PROCEDURE — 99999 PR PBB SHADOW E&M-EST. PATIENT-LVL III: CPT | Mod: PBBFAC,,, | Performed by: OPTOMETRIST

## 2024-09-16 NOTE — PATIENT INSTRUCTIONS
"DRY EYES -- BURNING OR TERESA SYMPTOMS:  Use Over The Counter artificial tears as needed for dry eye symptoms.   Some common brands include:  Systane, Optive, Refresh, and Thera-Tears.  These drops can be used as frequently as desired, but may be most helpful use during long periods of concentrated work.  For example, reading / working at the computer. Start with 3-4x per day.     Nighttime Ophthalmic gel or ointments are available: Refresh PM, Genteal, and Lacrilube.    Avoid drops that "get redness out" (Visine, Murine, Clear Eyes), as these may contain medication that could further irritate the eyes, especially with chronic use.    ALLERGY EYES -- ITCHING SYMPTOMS:  Over the counter medications include--Pataday, Zaditor, and Alaway.  Use as directed 1-2 drops daily for symptoms of itching / watering eyes.  These drops will not help for dry eye or exposure symptoms.    REDNESS RELIEF:  Lumify---is a good redness reliever that will not cause irritation if used chronically.        FLASHES / FLOATERS / POSTERIOR VITREOUS DETACHMENT    Call the clinic if you have any further changes in symptoms.  Including:  Increased numbers of floaters or flashing lights, dimness or darkness that moves through or stays constant in your vision, or any pain in the eye (s).    You may sometimes see small specks or clouds moving in your field of vision.  They are called FLOATERS.  You can often see them when looking at a plain background, like a blank wall or blue neal.  Floaters are actually tiny clumps of gel or cells inside the VITREOUS, the clear jelly-like fluid that fills the inside of your eye.    While these objects look like they are in front of your eye, they are actually floating inside.  What you see are the shadows they cast on the RETINA, the nerve layer at the back of the eye that senses light and allows you to see.      POSTERIOR VITREOUS DETACHMENT    The appearance of new floaters may be alarming.  If you suddenly " develop new floaters, you should contact your eye care professional  right away.    The retina can tear if the shrinking vitreous pulls away from the wall of the eye.  This sometimes causes a small amount of bleeding in the eye that may appear as new floaters.    A torn retina is always a serious problem, since it can lead to a retinal detachment.  You should see your eye care professional as soon as possible if:    even one new floater appears suddenly;  you see sudden flashes of light;  you notice other symptoms, like the loss of side vision, or a curtain closes down in your vision        POSTERIOR VITREOUS DETACHMENT is more common for people who:    are nearsighted;  have had cataract surgery;  have had YAG laser surgery of the eye;  have had inflammation inside the eye;  are over age 60.      While some floaters may remain visible, many of them will fade over time and become less noticeable.  Even if you've had some floaters for years, you should have your eyes checked as soon as possible if you notice new ones.    FLASHING LIGHTS    When the vitreous gel rubs or pulls on the retina, you may see what look like flashing lights or lightning streaks.  These flashes can appear off and on for several weeks or months.      Some people experience flashes of light that appear as jagged lines or heat waves in both eyes, lasting 10-20 minutes.  These flashes are caused by a spasm of blood vessels in the brain, which is called a migraine.    If a headache follows these flashes, it's called a migraine headache.  If   no headache occurs, these flashes are called Ophthalmic or Ocular Migraine.

## 2024-09-16 NOTE — PROGRESS NOTES
HPI    1 month post op FERNANDO 08/16/24    Pt denies any pain or discomfort post cataract sx. Pt noticed after a few   days post op OD not focused and if blinks a few times will come into   focus. Pt denies floaters, flashes and gtts.   Last edited by Noemi Main on 9/16/2024  1:06 PM.            Assessment /Plan     For exam results, see Encounter Report.    S/P cataract extraction and insertion of intraocular lens, right    Posterior vitreous detachment, bilateral      OD --CE w/ pciol 8/8/24 ---Dr Loco   POD #39     Doing well ---happy w/ vision   Finished all post op gtts   IOP/ DFE --stable from previus ---old barrier laser intact     Recheck Rx and gave copy for near only ---fill prn or just use otc       Discussed and educated patient on current findings /plan.  RTC 1 year annual w/ OPTOS  ---prn if any changes / issues

## 2024-10-10 ENCOUNTER — CLINICAL SUPPORT (OUTPATIENT)
Dept: CARDIOLOGY | Facility: HOSPITAL | Age: 69
End: 2024-10-10
Payer: MEDICARE

## 2024-10-10 ENCOUNTER — HOSPITAL ENCOUNTER (OUTPATIENT)
Dept: CARDIOLOGY | Facility: HOSPITAL | Age: 69
Discharge: HOME | End: 2024-10-10
Attending: INTERNAL MEDICINE
Payer: MEDICARE

## 2024-10-10 DIAGNOSIS — I49.8 OTHER SPECIFIED CARDIAC ARRHYTHMIAS: ICD-10-CM

## 2024-10-10 PROCEDURE — 93298 REM INTERROG DEV EVAL SCRMS: CPT | Mod: 26,,, | Performed by: INTERNAL MEDICINE

## 2024-10-14 LAB
OHS CV AF BURDEN PERCENT: < 1
OHS CV DC REMOTE DEVICE TYPE: NORMAL
OHS CV ICD SHOCK: NO

## 2024-10-25 ENCOUNTER — HOSPITAL ENCOUNTER (OUTPATIENT)
Dept: CARDIOLOGY | Facility: HOSPITAL | Age: 69
Discharge: HOME | End: 2024-10-25
Attending: INTERNAL MEDICINE
Payer: MEDICARE

## 2024-10-25 ENCOUNTER — E-VISIT (OUTPATIENT)
Dept: INTERNAL MEDICINE | Facility: CLINIC | Age: 69
End: 2024-10-25
Payer: MEDICARE

## 2024-10-25 ENCOUNTER — PATIENT MESSAGE (OUTPATIENT)
Dept: FAMILY MEDICINE | Facility: CLINIC | Age: 69
End: 2024-10-25
Payer: MEDICARE

## 2024-10-25 DIAGNOSIS — N30.00 ACUTE CYSTITIS WITHOUT HEMATURIA: Primary | ICD-10-CM

## 2024-10-25 RX ORDER — NITROFURANTOIN 25; 75 MG/1; MG/1
100 CAPSULE ORAL 2 TIMES DAILY
Qty: 10 CAPSULE | Refills: 0 | Status: SHIPPED | OUTPATIENT
Start: 2024-10-25 | End: 2024-10-30

## 2024-10-25 NOTE — PROGRESS NOTES
Patient ID: Tamica Cardona is a 68 y.o. female.    Chief Complaint: Urinary Tract Infection (Entered automatically based on patient selection in [employer].)    The patient initiated a request through [employer] on 10/25/2024 for evaluation and management with a chief complaint of Urinary Tract Infection (Entered automatically based on patient selection in [employer].)     I evaluated the questionnaire submission on 10/25/24.    [facility] Peq Evisit Uti Questionnaire    10/25/2024  4:48 PM CDT - Filed by Patient   Do you agree to participate in an E-Visit? Yes   If you have any of the following symptoms, please present to your local emergency room or call 911:  I acknowledge   Choose the state of your primary residence [place]   What is the main issue you would like addressed today? Urgent, frequent, painful urination   What symptoms do you currently have? Pain while passing urine;  Change in urine appearance or smell   When did your symptoms first appear? 10/24/2024   List what you have done or taken to help your symptoms. Taken otc uristat.   Please indicate whether you have had the following symptoms during the past 24 hours     Urgent urination (a sudden and uncontrollable urge to urinate) Moderate   Frequent urination of small amounts of urine (going to the toilet very often) Moderate   Burning pain when urinating Moderate   Incomplete bladder emptying (still feel like you need to urinate again after urination) Moderate   Pain not associated with urination in the lower abdomen below the belly button) None   What does your urine look like? I am not sure   Blood seen in the urine None   Flank pain (pain in one or both sides of the lower back) None   Abnormal Vaginal Discharge (abnormal amount, color and/or odor) None   Discharge from the urethra (urinary opening) without urination None   High body temperature/fever? None-<99.5   Please rate how much discomfort you have experience because of the symptoms in the past 24  hours: Moderate   Please indicate how the symptoms have interfered with your every day activities/work in the past 24 hours: Moderate   Please indicate how these symptoms have interfered with your social activities (visiting people, meeting with friends, etc.) in the past 24 hours? Moderate   Are you a diabetic? No   Please indicate whether you have the following at the time of completion of this questionnaire: None of the above   Provide any additional information you feel is important.    Please attach any relevant images or files (if you have performed a home test for UTI, please submit a photo of results)    Are you able to take your vital signs? Yes   Systolic Blood Pressure: 116   Diastolic Blood Pressure: 75   Weight: 154   Height: 65   Pulse: 83   Temperature: 98.7   Respiration rate:    Pulse Oxygen:          Encounter Diagnosis   Name Primary?    Acute cystitis without hematuria Yes        No orders of the defined types were placed in this encounter.     Medications Ordered This Encounter   Medications    nitrofurantoin, macrocrystal-monohydrate, (MACROBID) 100 MG capsule     Sig: Take 1 capsule (100 mg total) by mouth 2 (two) times daily. for 5 days     Dispense:  10 capsule     Refill:  0        No follow-ups on file.      E-Visit Time Tracking:    Day 1 Time (in minutes): 5    Total Time (in minutes): 5

## 2024-11-08 ENCOUNTER — TELEPHONE (OUTPATIENT)
Dept: FAMILY MEDICINE | Facility: CLINIC | Age: 69
End: 2024-11-08
Payer: MEDICARE

## 2024-11-10 ENCOUNTER — HOSPITAL ENCOUNTER (OUTPATIENT)
Dept: CARDIOLOGY | Facility: HOSPITAL | Age: 69
Discharge: HOME | End: 2024-11-10
Attending: INTERNAL MEDICINE
Payer: MEDICARE

## 2024-11-10 ENCOUNTER — CLINICAL SUPPORT (OUTPATIENT)
Dept: CARDIOLOGY | Facility: HOSPITAL | Age: 69
End: 2024-11-10
Payer: MEDICARE

## 2024-11-10 DIAGNOSIS — I49.8 OTHER SPECIFIED CARDIAC ARRHYTHMIAS: ICD-10-CM

## 2024-11-10 PROCEDURE — 93298 REM INTERROG DEV EVAL SCRMS: CPT | Mod: 26,,, | Performed by: INTERNAL MEDICINE

## 2024-11-11 ENCOUNTER — OFFICE VISIT (OUTPATIENT)
Dept: FAMILY MEDICINE | Facility: CLINIC | Age: 69
End: 2024-11-11
Payer: MEDICARE

## 2024-11-11 VITALS
SYSTOLIC BLOOD PRESSURE: 130 MMHG | BODY MASS INDEX: 26.1 KG/M2 | OXYGEN SATURATION: 95 % | HEIGHT: 65 IN | HEART RATE: 67 BPM | DIASTOLIC BLOOD PRESSURE: 80 MMHG | WEIGHT: 156.63 LBS | TEMPERATURE: 98 F

## 2024-11-11 DIAGNOSIS — F32.A DEPRESSION, UNSPECIFIED DEPRESSION TYPE: ICD-10-CM

## 2024-11-11 DIAGNOSIS — Z78.0 POST-MENOPAUSAL: ICD-10-CM

## 2024-11-11 DIAGNOSIS — M25.531 RIGHT WRIST PAIN: ICD-10-CM

## 2024-11-11 DIAGNOSIS — I48.0 PAROXYSMAL ATRIAL FIBRILLATION: ICD-10-CM

## 2024-11-11 DIAGNOSIS — R13.10 DYSPHAGIA, UNSPECIFIED TYPE: ICD-10-CM

## 2024-11-11 DIAGNOSIS — E78.2 MIXED HYPERLIPIDEMIA: ICD-10-CM

## 2024-11-11 DIAGNOSIS — G47.33 OBSTRUCTIVE SLEEP APNEA SYNDROME: ICD-10-CM

## 2024-11-11 DIAGNOSIS — Z00.00 ANNUAL PHYSICAL EXAM: Primary | ICD-10-CM

## 2024-11-11 DIAGNOSIS — Z12.11 COLON CANCER SCREENING: ICD-10-CM

## 2024-11-11 DIAGNOSIS — G25.0 BENIGN FAMILIAL TREMOR: ICD-10-CM

## 2024-11-11 DIAGNOSIS — F41.9 ANXIETY DISORDER, UNSPECIFIED TYPE: ICD-10-CM

## 2024-11-11 DIAGNOSIS — Z12.31 ENCOUNTER FOR SCREENING MAMMOGRAM FOR MALIGNANT NEOPLASM OF BREAST: ICD-10-CM

## 2024-11-11 PROCEDURE — 99214 OFFICE O/P EST MOD 30 MIN: CPT | Mod: S$GLB,,, | Performed by: NURSE PRACTITIONER

## 2024-11-11 NOTE — PROGRESS NOTES
Subjective:       Patient ID: Tamica Cardona is a 69 y.o. female.    Chief Complaint: follow up    HPI here for follow up. Medical history of Allergy, Anxiety, Atrial fibrillation, Sleep apnea with Cpap, Breast cancer, right sided mastectomy, Tremor, Depression, History of UTI, Interstitial cystitis, KEMAL, long term use anticoagulants and Neutropenia.     She is due for mammogram, Colonoscopy, EGD, Dexa scan.     She is followed by Neurology. She has been taking Propranolol for her tremor. This has been helping her anxiety also.     Right wrist pain, hurts to pick something up with it. She is right handed. Pain in the wrist. Onset 3 months. No injury. Wakes her up during the night. Has cyst like area. She would like referral to hand specialist for evaluation and treatment options.    Followed by Cardiology, most recent note reviewed.  Focused Past History includes:   Hyperlipidemia  Paroxysmal atrial fibrillation  ILR device check in 5/2024: NSR, 0.8% AF with -180 bpm  TTE 1/2024: LVEF 65%. Normal LA, RA. Trace AR, trace MR, trace TR. PASP 22.   Tachy-steve syndrome   YURI on CPAP    Followed by Dr. Claire for sleep apnea.     Followed by Ophthalmology: cataract removal.    She has multiple chronic issues to review.     The following portion of the patients history was reviewed and updated as appropriate: allergies, current medications, past medical and surgical history. Past social history and problem list reviewed. Family PMH and Past social history reviewed. Tobacco, Illicit drug use reviewed.      Review of patient's allergies indicates:   Allergen Reactions    Thimerosal Swelling    Erythromycin base      Other reaction(s): Vomiting  Other reaction(s): Stomach upset  Other reaction(s): Nausea    Penicillins      Other reaction(s): convulsions         Current Outpatient Medications:     acetaminophen (TYLENOL) 500 MG tablet, Take 750 mg by mouth every 6 (six) hours as needed for Pain., Disp: , Rfl:      calcium carbonate (OS-DESTINY) 600 mg (1,500 mg) Tab, Take 600 mg by mouth 2 (two) times daily with meals., Disp: , Rfl:     clonazePAM (KLONOPIN) 0.5 MG tablet, Take 1/2 to 1 tablet daily as needed for anxiety, Disp: 30 tablet, Rfl: 0    ELIQUIS 5 mg Tab, TAKE ONE TABLET BY MOUTH TWICE DAILY, Disp: 60 tablet, Rfl: 6    EScitalopram oxalate (LEXAPRO) 10 MG tablet, Take 1.5 tablets (15 mg total) by mouth once daily., Disp: 180 tablet, Rfl: 3    ferrous gluconate (FERGON) 325 MG tablet, Take 45 mg by mouth daily with breakfast., Disp: , Rfl:     fexofenadine (ALLEGRA ALLERGY) 180 MG tablet, 180 mg., Disp: , Rfl:     flecainide (TAMBOCOR) 100 MG Tab, Take 1 tablet (100 mg total) by mouth every 12 (twelve) hours as needed., Disp: 60 tablet, Rfl: 11    fluticasone propionate (FLONASE) 50 mcg/actuation nasal spray, , Disp: , Rfl:     loratadine (CLARITIN) 10 mg tablet, Take 10 mg by mouth daily as needed. , Disp: , Rfl:     meclizine (ANTIVERT) 25 mg tablet, Take 1 tablet (25 mg total) by mouth 3 (three) times daily as needed., Disp: 30 tablet, Rfl: 1    prednisolon/gatiflox/bromfenac (PREDNISOL ACE-GATIFLOX-BROMFEN) 1-0.5-0.075 % DrpS, Apply 1 drop to eye 3 (three) times daily. in operative eye for 1 month after surgery, Disp: 5 mL, Rfl: 3    propranoloL (INDERAL) 20 MG tablet, Take 1 tablet (20 mg total) by mouth 2 (two) times daily., Disp: 270 tablet, Rfl: 1    rosuvastatin (CRESTOR) 10 MG tablet, Take 1 tablet (10 mg total) by mouth once daily., Disp: 90 tablet, Rfl: 3    vitamin D (VITAMIN D3) 1000 units Tab, Take 1,000 Units by mouth once daily., Disp: , Rfl:     Current Facility-Administered Medications:     sodium chloride 0.9% flush 10 mL, 10 mL, Intravenous, PRN, Nik Loco MD    Past Medical History:   Diagnosis Date    Allergy     Anxiety     Atrial fibrillation 10/12/2020    Breast cancer 11/2003    right    Depression     General anesthetics causing adverse effect in therapeutic use     slow to wake--  easily sedated    History of UTI     Interstitial cystitis 2003    Neutropenia     Streptococcal pharyngitis 01/05/2024       Past Surgical History:   Procedure Laterality Date    BREAST BIOPSY Left 2006    benign    CATARACT EXTRACTION W/  INTRAOCULAR LENS IMPLANT Right 8/8/2024    Procedure: EXTRACTION, CATARACT, WITH IOL INSERTION;  Surgeon: Nik Loco MD;  Location: [facility] OR;  Service: Ophthalmology;  Laterality: Right;    CYSTOSCOPY  2003    EYE SURGERY  01/2015    HYSTERECTOMY      TVH, ovaries intact. Uterine prolapse    INSERTION OF IMPLANTABLE LOOP RECORDER Left 02/08/2021    Procedure: Insertion, Implantable Loop Recorder;  Surgeon: Collin Roberts MD;  [facility];  Service: Cardiology;  Laterality: Left;    KNEE ARTHROSCOPY W/ MENISCECTOMY  1992    LT    left breast biopsy  2006    benign    MASTECTOMY, RADICAL Right 12/2003    RETINAL DETACHMENT SURGERY Right        Social History     Socioeconomic History    Marital status:    Tobacco Use    Smoking status: Never    Smokeless tobacco: Never   Substance and Sexual Activity    Alcohol use: No    Drug use: No     Social Drivers of Health     Financial Resource Strain: Low Risk  (2/3/2024)    Overall Financial Resource Strain (CARDIA)     Difficulty of Paying Living Expenses: Not hard at all   Food Insecurity: No Food Insecurity (2/3/2024)    Hunger Vital Sign     Worried About Running Out of Food in the Last Year: Never true     Ran Out of Food in the Last Year: Never true   Transportation Needs: No Transportation Needs (2/3/2024)    PRAPARE - Transportation     Lack of Transportation (Medical): No     Lack of Transportation (Non-Medical): No   Physical Activity: Insufficiently Active (2/3/2024)    Exercise Vital Sign     Days of Exercise per Week: 3 days     Minutes of Exercise per Session: 40 min   Stress: No Stress Concern Present (2/3/2024)    [employer] [place] [employer] - Occupational Stress Questionnaire      "Feeling of Stress : Only a little   Housing Stability: Low Risk  (2/3/2024)    Housing Stability Vital Sign     Unable to Pay for Housing in the Last Year: No     Number of Places Lived in the Last Year: 1     Unstable Housing in the Last Year: No     Review of Systems   Constitutional:  Negative for fatigue and fever.   HENT: Negative.     Eyes:  Negative for visual disturbance.   Respiratory:  Negative for cough, chest tightness, shortness of breath and wheezing.    Cardiovascular:  Negative for chest pain, palpitations and leg swelling.   Gastrointestinal:  Negative for abdominal pain, blood in stool, diarrhea, nausea and vomiting.   Genitourinary: Negative.    Musculoskeletal:  Negative for arthralgias, back pain and gait problem.   Skin: Negative.    Neurological:  Negative for headaches.   Psychiatric/Behavioral:  Negative for dysphoric mood and sleep disturbance. The patient is not nervous/anxious.        Objective:      /80 (BP Location: Left arm, Patient Position: Sitting)   Pulse 67   Temp 98.2 °F (36.8 °C) (Temporal)   Ht 5' 5" (1.651 m)   Wt 71.1 kg (156 lb 10.2 oz)   SpO2 95%   BMI 26.07 kg/m²      Physical Exam  Constitutional:       Appearance: Normal appearance. She is normal weight.   HENT:      Head: Normocephalic.   Eyes:      Pupils: Pupils are equal, round, and reactive to light.   Neck:      Thyroid: No thyromegaly.      Vascular: No carotid bruit.   Cardiovascular:      Rate and Rhythm: Normal rate and regular rhythm.      Pulses: Normal pulses.      Heart sounds: Normal heart sounds. No murmur heard.  Pulmonary:      Effort: Pulmonary effort is normal.      Breath sounds: Normal breath sounds. No wheezing.   Abdominal:      General: Bowel sounds are normal.      Tenderness: There is no abdominal tenderness.   Musculoskeletal:         General: Normal range of motion.      Cervical back: Normal range of motion.      Right lower leg: No edema.      Left lower leg: No edema.      " Comments: Gait normal.  strong, equal   Skin:     General: Skin is warm and dry.      Capillary Refill: Capillary refill takes less than 2 seconds.   Neurological:      General: No focal deficit present.      Mental Status: She is alert.   Psychiatric:         Attention and Perception: Attention and perception normal.         Mood and Affect: Mood and affect normal.         Speech: Speech normal.         Behavior: Behavior normal.         Assessment:       1. Annual physical exam    2. Right wrist pain    3. Encounter for screening mammogram for malignant neoplasm of breast    4. Post-menopausal    5. Colon cancer screening    6. Dysphagia, unspecified type    7. Benign familial tremor    8. Anxiety disorder, unspecified type    9. Depression, unspecified depression type    10. Mixed hyperlipidemia    11. Paroxysmal atrial fibrillation    12. Obstructive sleep apnea syndrome        Plan:        Diagnoses and all orders for this visit:    Annual physical exam    Right wrist pain: will refer to Ortho for evaluation  -     Ambulatory referral/consult to Orthopedics; Future    Encounter for screening mammogram for malignant neoplasm of breast  -     Mammo Digital Screening Left with Jose Alfredo; Future    Post-menopausal: due for Dexa.   -     DXA Bone Density Axial Skeleton 1 or more sites; Future    Colon cancer screening  -     Case Request Endoscopy: COLONOSCOPY    Dysphagia, unspecified type  -     Case Request Endoscopy: EGD (ESOPHAGOGASTRODUODENOSCOPY)    Benign familial tremor: improved with Propranolol    Anxiety disorder, unspecified type: stable with Propranolol and Lexapro    Depression, unspecified depression type: stable with current does of Lexapro    Mixed hyperlipidemia: tolerating statin. Good control    Lab Results   Component Value Date    LDLCALC 80.6 05/08/2024        Paroxysmal atrial fibrillation: NSR. On Eliquis, Flecainide.     Obstructive sleep apnea syndrome: compliant with Cpap      Continue  current medication  Take medications only as prescribed  Healthy diet, exercise  Adequate rest  Adequate hydration  Avoid allergens  Avoid excessive caffeine     Follow up 6 months

## 2024-11-14 LAB
OHS CV AF BURDEN PERCENT: 1.6
OHS CV DC REMOTE DEVICE TYPE: NORMAL
OHS CV ICD SHOCK: NO

## 2024-11-18 ENCOUNTER — HOSPITAL ENCOUNTER (OUTPATIENT)
Dept: RADIOLOGY | Facility: HOSPITAL | Age: 69
Discharge: HOME | End: 2024-11-18
Attending: PHYSICIAN ASSISTANT
Payer: MEDICARE

## 2024-11-18 ENCOUNTER — PATIENT MESSAGE (OUTPATIENT)
Dept: FAMILY MEDICINE | Facility: CLINIC | Age: 69
End: 2024-11-18
Payer: MEDICARE

## 2024-11-18 ENCOUNTER — OFFICE VISIT (OUTPATIENT)
Dept: ORTHOPEDICS | Facility: CLINIC | Age: 69
End: 2024-11-18
Payer: MEDICARE

## 2024-11-18 VITALS — BODY MASS INDEX: 26.12 KG/M2 | HEIGHT: 65 IN | WEIGHT: 156.75 LBS

## 2024-11-18 DIAGNOSIS — M77.8 RIGHT WRIST TENDINITIS: ICD-10-CM

## 2024-11-18 DIAGNOSIS — M77.8 RIGHT WRIST TENDINITIS: Primary | ICD-10-CM

## 2024-11-18 PROCEDURE — 73110 X-RAY EXAM OF WRIST: CPT | Mod: TC,PO,RT

## 2024-11-18 PROCEDURE — 99213 OFFICE O/P EST LOW 20 MIN: CPT | Mod: S$PBB,,, | Performed by: PHYSICIAN ASSISTANT

## 2024-11-18 PROCEDURE — 99214 OFFICE O/P EST MOD 30 MIN: CPT | Mod: PBBFAC,25,PO | Performed by: PHYSICIAN ASSISTANT

## 2024-11-18 PROCEDURE — 73110 X-RAY EXAM OF WRIST: CPT | Mod: 26,RT,, | Performed by: RADIOLOGY

## 2024-11-18 PROCEDURE — 99999 PR PBB SHADOW E&M-EST. PATIENT-LVL IV: CPT | Mod: PBBFAC,,, | Performed by: PHYSICIAN ASSISTANT

## 2024-11-18 RX ORDER — METHYLPREDNISOLONE 4 MG/1
TABLET ORAL
Qty: 21 EACH | Refills: 0 | Status: SHIPPED | OUTPATIENT
Start: 2024-11-18 | End: 2024-12-09

## 2024-11-18 NOTE — PROGRESS NOTES
11/18/2024    HPI:  Tamica Cardona is a 69 y.o. female, who presents to clinic today for evaluation of her right wrist pain.  States pain has been present for the past 3 months.  States pain is worse with activity.  States pain is better with rest.  Denies any specific acute injury she can recall.  States pain is particularly worse with cooking.  Denies any paresthesias.  Denies any other complaints at this time.    PMHX:  Past Medical History:   Diagnosis Date    Allergy     Anxiety     Atrial fibrillation 10/12/2020    Breast cancer 11/2003    right    Depression     General anesthetics causing adverse effect in therapeutic use     slow to wake-- easily sedated    History of UTI     Interstitial cystitis 2003    Neutropenia     Streptococcal pharyngitis 01/05/2024       PSHX:  Past Surgical History:   Procedure Laterality Date    BREAST BIOPSY Left 2006    benign    CATARACT EXTRACTION W/  INTRAOCULAR LENS IMPLANT Right 8/8/2024    Procedure: EXTRACTION, CATARACT, WITH IOL INSERTION;  Surgeon: Nik Loco MD;  Location: [facility] OR;  Service: Ophthalmology;  Laterality: Right;    CYSTOSCOPY  2003    EYE SURGERY  01/2015    HYSTERECTOMY      TVH, ovaries intact. Uterine prolapse    INSERTION OF IMPLANTABLE LOOP RECORDER Left 02/08/2021    Procedure: Insertion, Implantable Loop Recorder;  Surgeon: Collin Roberts MD;  Location: [facility];  Service: Cardiology;  Laterality: Left;    KNEE ARTHROSCOPY W/ MENISCECTOMY  1992    LT    left breast biopsy  2006    benign    MASTECTOMY, RADICAL Right 12/2003    RETINAL DETACHMENT SURGERY Right        FMHX:  Family History   Problem Relation Name Age of Onset    Cancer Father          bladder, prostate, lung cancer    Glaucoma Father      Cataracts Father      Macular degeneration Father      No Known Problems Mother      Cataracts Brother      Retinal detachment Brother      Glaucoma Brother      No Known Problems Sister      No Known Problems Maternal Aunt      No  Known Problems Maternal Uncle      No Known Problems Paternal Aunt      No Known Problems Paternal Uncle      No Known Problems Maternal Grandmother      No Known Problems Maternal Grandfather      No Known Problems Paternal Grandmother      No Known Problems Paternal Grandfather      Urolithiasis Neg Hx      Kidney cancer Neg Hx      Amblyopia Neg Hx      Blindness Neg Hx      Diabetes Neg Hx      Hypertension Neg Hx      Strabismus Neg Hx      Stroke Neg Hx      Thyroid disease Neg Hx      Melanoma Neg Hx         SOCHX:  Social History     Tobacco Use    Smoking status: Never    Smokeless tobacco: Never   Substance Use Topics    Alcohol use: No       ALLERGIES:  Thimerosal, Erythromycin base, and Penicillins    CURRENT MEDICATIONS:  Current Outpatient Medications on File Prior to Visit   Medication Sig Dispense Refill    acetaminophen (TYLENOL) 500 MG tablet Take 750 mg by mouth every 6 (six) hours as needed for Pain.      calcium carbonate (OS-DESTINY) 600 mg (1,500 mg) Tab Take 600 mg by mouth 2 (two) times daily with meals.      clonazePAM (KLONOPIN) 0.5 MG tablet Take 1/2 to 1 tablet daily as needed for anxiety 30 tablet 0    ELIQUIS 5 mg Tab TAKE ONE TABLET BY MOUTH TWICE DAILY 60 tablet 6    EScitalopram oxalate (LEXAPRO) 10 MG tablet Take 1.5 tablets (15 mg total) by mouth once daily. 180 tablet 3    ferrous gluconate (FERGON) 325 MG tablet Take 45 mg by mouth daily with breakfast.      fexofenadine (ALLEGRA ALLERGY) 180 MG tablet 180 mg.      fluticasone propionate (FLONASE) 50 mcg/actuation nasal spray       loratadine (CLARITIN) 10 mg tablet Take 10 mg by mouth daily as needed.       meclizine (ANTIVERT) 25 mg tablet Take 1 tablet (25 mg total) by mouth 3 (three) times daily as needed. 30 tablet 1    prednisolon/gatiflox/bromfenac (PREDNISOL ACE-GATIFLOX-BROMFEN) 1-0.5-0.075 % DrpS Apply 1 drop to eye 3 (three) times daily. in operative eye for 1 month after surgery 5 mL 3    propranoloL (INDERAL) 20 MG  "tablet Take 1 tablet (20 mg total) by mouth 2 (two) times daily. 270 tablet 1    rosuvastatin (CRESTOR) 10 MG tablet Take 1 tablet (10 mg total) by mouth once daily. 90 tablet 3    vitamin D (VITAMIN D3) 1000 units Tab Take 1,000 Units by mouth once daily.      flecainide (TAMBOCOR) 100 MG Tab Take 1 tablet (100 mg total) by mouth every 12 (twelve) hours as needed. 60 tablet 11     Current Facility-Administered Medications on File Prior to Visit   Medication Dose Route Frequency Provider Last Rate Last Admin    sodium chloride 0.9% flush 10 mL  10 mL Intravenous PRN Nik Loco MD           REVIEW OF SYSTEMS:  Review of Systems Complete; Negative, unless noted above.    GENERAL PHYSICAL EXAM:   Ht 5' 5" (1.651 m)   Wt 71.1 kg (156 lb 12 oz)   BMI 26.08 kg/m²    GEN: well developed, well nourished, no acute distress   PULM: No wheezing, no respiratory distress   CV: RRR    ORTHO EXAM:   Examination of the right wrist reveals no edema, erythema, ecchymosis, or skin breakdown.  Tenderness to palpation over the insertion site of the flexor carpi radialis tendon of the right wrist.  Tenderness palpation of the distal aspect of the flexor carpi radialis tendon.  No significant tenderness palpation throughout the remainder of the right hand/wrist.  Tenderness with terminal extension of the right wrist.  No tenderness with terminal flexion.  5/5 /intrinsic strength.  Normal sensation in the radial, ulnar, median nerve distributions.  Capillary refill is 2 seconds.    RADIOLOGY:   X-rays of the right wrist were taken today in clinic.  X-rays reviewed by myself.  Imaging showed no acute fracture or dislocation no subluxation.  No radiopaque foreign body or mass noted no osseous destructive/erosive processes noted.  Presence of mild degenerative changes of the radiocarpal joint, STT joint, and 1st CMC joint.  No other significant bony or masses noted.    ASSESSMENT:   Flexor carpi radialis tendon of the right wrist, " right wrist osteoarthritis    PLAN:  1. I discussed with Tamica Cardona the wrist tendinitis and osteoarthritis pathology and treatment options in detail during today's visit.  After treatment options were discussed, we decided the best course of action this time is to proceed with a course of oral anti-inflammatories and splinting.  We discussed he is not a good candidate for oral prescription NSAIDs being on blood thinners.  We did discuss we would proceed with a a Medrol Dosepak instead.  She verbally agreed with the treatment plan     2.  She was placed in a removable Velcro short wrist splint of the right wrist in clinic today.  She was instructed to wear it at all times only to remove for bathing until seen again in clinic.  She verbalized understanding     3. She was prescribed a Medrol Dosepak in clinic today.  She was instructed to discontinue the medication for any adverse effects.  She verbalized understanding     4. I would like her follow up in clinic in 2 weeks for repeat evaluation.  She was instructed to contact clinic for any problems or concerns in the interim.

## 2024-12-03 ENCOUNTER — OFFICE VISIT (OUTPATIENT)
Dept: ORTHOPEDICS | Facility: CLINIC | Age: 69
End: 2024-12-03
Payer: MEDICARE

## 2024-12-03 DIAGNOSIS — M77.8 RIGHT WRIST TENDINITIS: Primary | ICD-10-CM

## 2024-12-03 PROCEDURE — 99213 OFFICE O/P EST LOW 20 MIN: CPT | Mod: S$PBB,,, | Performed by: PHYSICIAN ASSISTANT

## 2024-12-03 PROCEDURE — 99999 PR PBB SHADOW E&M-EST. PATIENT-LVL III: CPT | Mod: PBBFAC,,, | Performed by: PHYSICIAN ASSISTANT

## 2024-12-03 PROCEDURE — 99213 OFFICE O/P EST LOW 20 MIN: CPT | Mod: PBBFAC,PO | Performed by: PHYSICIAN ASSISTANT

## 2024-12-03 NOTE — PROGRESS NOTES
12/3/2024    HPI:  Tamica Cardona is a 69 y.o. female, who presents to clinic today for continued evaluation of her right wrist tendinitis/arthritis.  States overall her symptoms have improved.  States her pain of the wrist is not as severe.  Denies acute injuries since last visit.  States he has worn the splint as instructed.  Denies any other complaints at this time.    PMHX:  Past Medical History:   Diagnosis Date    Allergy     Anxiety     Atrial fibrillation 10/12/2020    Breast cancer 11/2003    right    Depression     General anesthetics causing adverse effect in therapeutic use     slow to wake-- easily sedated    History of UTI     Interstitial cystitis 2003    Neutropenia     Streptococcal pharyngitis 01/05/2024       PSHX:  Past Surgical History:   Procedure Laterality Date    BREAST BIOPSY Left 2006    benign    CATARACT EXTRACTION W/  INTRAOCULAR LENS IMPLANT Right 8/8/2024    Procedure: EXTRACTION, CATARACT, WITH IOL INSERTION;  Surgeon: Nik Loco MD;  Location: [facility];  Service: Ophthalmology;  Laterality: Right;    CYSTOSCOPY  2003    EYE SURGERY  01/2015    HYSTERECTOMY      TVH, ovaries intact. Uterine prolapse    INSERTION OF IMPLANTABLE LOOP RECORDER Left 02/08/2021    Procedure: Insertion, Implantable Loop Recorder;  Surgeon: Collin Roberts MD;  Location: [facility];  Service: Cardiology;  Laterality: Left;    KNEE ARTHROSCOPY W/ MENISCECTOMY  1992    LT    left breast biopsy  2006    benign    MASTECTOMY, RADICAL Right 12/2003    RETINAL DETACHMENT SURGERY Right        FMHX:  Family History   Problem Relation Name Age of Onset    Cancer Father          bladder, prostate, lung cancer    Glaucoma Father      Cataracts Father      Macular degeneration Father      No Known Problems Mother      Cataracts Brother      Retinal detachment Brother      Glaucoma Brother      No Known Problems Sister      No Known Problems Maternal Aunt      No Known Problems Maternal Uncle      No Known  Problems Paternal Aunt      No Known Problems Paternal Uncle      No Known Problems Maternal Grandmother      No Known Problems Maternal Grandfather      No Known Problems Paternal Grandmother      No Known Problems Paternal Grandfather      Urolithiasis Neg Hx      Kidney cancer Neg Hx      Amblyopia Neg Hx      Blindness Neg Hx      Diabetes Neg Hx      Hypertension Neg Hx      Strabismus Neg Hx      Stroke Neg Hx      Thyroid disease Neg Hx      Melanoma Neg Hx         SOCHX:  Social History     Tobacco Use    Smoking status: Never    Smokeless tobacco: Never   Substance Use Topics    Alcohol use: No       ALLERGIES:  Thimerosal, Erythromycin base, and Penicillins    CURRENT MEDICATIONS:  Current Outpatient Medications on File Prior to Visit   Medication Sig Dispense Refill    acetaminophen (TYLENOL) 500 MG tablet Take 750 mg by mouth every 6 (six) hours as needed for Pain.      calcium carbonate (OS-DESTINY) 600 mg (1,500 mg) Tab Take 600 mg by mouth 2 (two) times daily with meals.      clonazePAM (KLONOPIN) 0.5 MG tablet Take 1/2 to 1 tablet daily as needed for anxiety 30 tablet 0    ELIQUIS 5 mg Tab TAKE ONE TABLET BY MOUTH TWICE DAILY 60 tablet 6    EScitalopram oxalate (LEXAPRO) 10 MG tablet Take 1.5 tablets (15 mg total) by mouth once daily. 180 tablet 3    ferrous gluconate (FERGON) 325 MG tablet Take 45 mg by mouth daily with breakfast.      fexofenadine (ALLEGRA ALLERGY) 180 MG tablet 180 mg.      fluticasone propionate (FLONASE) 50 mcg/actuation nasal spray       loratadine (CLARITIN) 10 mg tablet Take 10 mg by mouth daily as needed.       meclizine (ANTIVERT) 25 mg tablet Take 1 tablet (25 mg total) by mouth 3 (three) times daily as needed. 30 tablet 1    methylPREDNISolone (MEDROL DOSEPACK) 4 mg tablet use as directed 21 each 0    prednisolon/gatiflox/bromfenac (PREDNISOL ACE-GATIFLOX-BROMFEN) 1-0.5-0.075 % DrpS Apply 1 drop to eye 3 (three) times daily. in operative eye for 1 month after surgery 5 mL 3     propranoloL (INDERAL) 20 MG tablet Take 1 tablet (20 mg total) by mouth 2 (two) times daily. 270 tablet 1    rosuvastatin (CRESTOR) 10 MG tablet Take 1 tablet (10 mg total) by mouth once daily. 90 tablet 3    vitamin D (VITAMIN D3) 1000 units Tab Take 1,000 Units by mouth once daily.      flecainide (TAMBOCOR) 100 MG Tab Take 1 tablet (100 mg total) by mouth every 12 (twelve) hours as needed. 60 tablet 11     Current Facility-Administered Medications on File Prior to Visit   Medication Dose Route Frequency Provider Last Rate Last Admin    sodium chloride 0.9% flush 10 mL  10 mL Intravenous PRN Nik Loco MD           REVIEW OF SYSTEMS:  Review of Systems Complete; Negative, unless noted above.    GENERAL PHYSICAL EXAM:   There were no vitals taken for this visit.   GEN: well developed, well nourished, no acute distress   PULM: No wheezing, no respiratory distress   CV: RRR    ORTHO EXAM:   Examination of the right wrist reveals no edema, erythema, ecchymosis, or skin breakdown.  Presence of mild tenderness to palpation of the thenar musculature.  No tenderness palpation of the FCR tendon.  No significant tenderness palpation throughout the remainder of the right hand/wrist.  Normal sensation in the radial, ulnar, and median nerve distributions.  Capillary refill is than 2 seconds.    RADIOLOGY:   None.    ASSESSMENT:   Right wrist tendinitis/arthritis    PLAN:  1. I discussed with Tamica Cardona that the best course of action this time is to transition to wearing the removable Velcro wrist splint for activity when she is out of the house only.  She verbally agreed with the treatment plan.      2. I would like her follow up in clinic in 3 weeks if she does not continuing to improve.  She was instructed to contact clinic for any problems or concerns in the interim.

## 2024-12-11 ENCOUNTER — CLINICAL SUPPORT (OUTPATIENT)
Dept: CARDIOLOGY | Facility: HOSPITAL | Age: 69
End: 2024-12-11
Payer: MEDICARE

## 2024-12-11 ENCOUNTER — HOSPITAL ENCOUNTER (OUTPATIENT)
Dept: CARDIOLOGY | Facility: HOSPITAL | Age: 69
Discharge: HOME | End: 2024-12-11
Attending: INTERNAL MEDICINE
Payer: MEDICARE

## 2024-12-11 DIAGNOSIS — I49.8 OTHER SPECIFIED CARDIAC ARRHYTHMIAS: ICD-10-CM

## 2024-12-11 PROCEDURE — 93298 REM INTERROG DEV EVAL SCRMS: CPT | Mod: 26,,, | Performed by: INTERNAL MEDICINE

## 2024-12-12 RX ORDER — ESCITALOPRAM OXALATE 10 MG/1
TABLET ORAL
Qty: 135 TABLET | Refills: 3 | Status: SHIPPED | OUTPATIENT
Start: 2024-12-12

## 2024-12-18 LAB
OHS CV AF BURDEN PERCENT: 2.2
OHS CV DC REMOTE DEVICE TYPE: NORMAL
OHS CV ICD SHOCK: NO

## 2024-12-22 ENCOUNTER — HOSPITAL ENCOUNTER (OUTPATIENT)
Dept: CARDIOLOGY | Facility: HOSPITAL | Age: 69
Discharge: HOME | End: 2024-12-22
Attending: INTERNAL MEDICINE
Payer: MEDICARE

## 2024-12-24 ENCOUNTER — PATIENT MESSAGE (OUTPATIENT)
Dept: CARDIOLOGY | Facility: HOSPITAL | Age: 69
End: 2024-12-24
Payer: MEDICARE

## 2024-12-26 DIAGNOSIS — I48.0 PAROXYSMAL ATRIAL FIBRILLATION: ICD-10-CM

## 2024-12-26 RX ORDER — PROPRANOLOL HYDROCHLORIDE 20 MG/1
20 TABLET ORAL 3 TIMES DAILY
Qty: 270 TABLET | Refills: 1 | Status: SHIPPED | OUTPATIENT
Start: 2024-12-26

## 2024-12-27 NOTE — TELEPHONE ENCOUNTER
Atrial fibrillation with RVR noted during device remote check:  MDT ILR implanted fro AF management    Overall burden:  1.3% 12/11/24-12/22/24  AF with RVR noted x2, nocturnal events  Patient denies any s/s, states she was sleeping, confirmed f/u appt on 1/13/25 12/21/24 @ 23:51pm 2hr 36min MVR 125bpm      11/29/24 @2:19am 2hr 28mins MVR 118bpm          Anticoagulation status:  Eliquis 5mg BID  Flecainide 100mg PRN  Propranolol 20mg TID

## 2025-01-07 ENCOUNTER — HOSPITAL ENCOUNTER (OUTPATIENT)
Dept: CARDIOLOGY | Facility: HOSPITAL | Age: 70
Discharge: HOME | End: 2025-01-07
Attending: INTERNAL MEDICINE
Payer: MEDICARE

## 2025-01-10 NOTE — PROGRESS NOTES
"Subjective:    Patient ID:  Tamica Cardona is a 69 y.o. female who presents for follow-up of Hyperlipidemia      Problem List Items Addressed This Visit          Cardiac/Vascular    Mixed hyperlipidemia - Primary    Paroxysmal atrial fibrillation    Relevant Orders    Echo    Tachy-steve syndrome         Patient was last seen on 04/26/2023 at which time she was doing okay from cardiac standpoint.    History of Present Illness    CHIEF COMPLAINT:  Ms. Cardona presents today for follow-up regarding atrial fibrillation.    ATRIAL FIBRILLATION:  She reports improvement in AFib symptoms with decreased frequency of episodes. She experiences occasional tachycardia, primarily at night. She takes Flecainide as needed during episodes and Eliquis daily for management.    SLEEP APNEA:  She continues CPAP usage despite disliking the device, noting symptomatic improvement with compliance.    Parts of this note were transcribed using voice recognition and generative artificial intelligence software ([employer]).  Please excuse any grammatical or syntax errors and reach out to me with any questions or clarifications needed.           Objective:     Vitals:    01/13/25 1003   BP: 122/69   BP Location: Left arm   Patient Position: Sitting   Pulse: 67   Weight: 72.4 kg (159 lb 9.8 oz)   Height: 5' 5" (1.651 m)       BP Readings from Last 5 Encounters:   01/13/25 122/69   11/11/24 130/80   08/08/24 127/60   06/11/24 108/65   05/08/24 112/70        Physical Exam  Vitals and nursing note reviewed.   Constitutional:       General: She is not in acute distress.     Appearance: She is well-developed.   HENT:      Head: Normocephalic and atraumatic.   Neck:      Vascular: No JVD.   Cardiovascular:      Rate and Rhythm: Normal rate and regular rhythm.      Heart sounds: Normal heart sounds. No murmur heard.     No friction rub. No gallop.   Pulmonary:      Effort: Pulmonary effort is normal. No respiratory distress.      " Breath sounds: Normal breath sounds. No wheezing or rales.   Abdominal:      General: Bowel sounds are normal.      Palpations: Abdomen is soft.      Tenderness: There is no abdominal tenderness. There is no guarding or rebound.   Musculoskeletal:         General: No tenderness.      Cervical back: Neck supple.   Skin:     General: Skin is warm and dry.   Neurological:      Mental Status: She is alert and oriented to person, place, and time.   Psychiatric:         Behavior: Behavior normal.             Current Outpatient Medications   Medication Instructions    acetaminophen (TYLENOL) 750 mg, Every 6 hours PRN    calcium carbonate (OS-DESTINY) 600 mg, 2 times daily with meals    clonazePAM (KLONOPIN) 0.5 MG tablet Take 1/2 to 1 tablet daily as needed for anxiety    ELIQUIS 5 mg, Oral, 2 times daily    EScitalopram oxalate (LEXAPRO) 10 MG tablet TAKE ONE + ONE-HALF TABLETS BY MOUTH EVERY DAY    ferrous gluconate (FERGON) 45 mg, With breakfast    fexofenadine (ALLEGRA ALLERGY) 180 mg    flecainide (TAMBOCOR) 100 mg, Oral, Every 12 hours PRN    fluticasone propionate (FLONASE) 50 mcg/actuation nasal spray No dose, route, or frequency recorded.    loratadine (CLARITIN) 10 mg, Daily PRN    meclizine (ANTIVERT) 25 mg, Oral, 3 times daily PRN    prednisolon/gatiflox/bromfenac (PREDNISOL ACE-GATIFLOX-BROMFEN) 1-0.5-0.075 % DrpS 1 drop, Ophthalmic, 3 times daily, in operative eye for 1 month after surgery    propranoloL (INDERAL) 20 mg, Oral, 3 times daily    rosuvastatin (CRESTOR) 10 mg, Oral, Daily    vitamin D (VITAMIN D3) 1,000 Units, Daily       Lipid Panel:   Lab Results   Component Value Date    CHOL 154 05/08/2024    HDL 56 05/08/2024    LDLCALC 80.6 05/08/2024    TRIG 87 05/08/2024    CHOLHDL 36.4 05/08/2024       The 10-year ASCVD risk score (Mandy ANDREWS, et al., 2019) is: 7.1%    Values used to calculate the score:      Age: 69 years      Sex: Female      Is Non- : No      Diabetic: No       Tobacco smoker: No      Systolic Blood Pressure: 122 mmHg      Is BP treated: No      HDL Cholesterol: 56 mg/dL      Total Cholesterol: 154 mg/dL    Most Recent EKG Results  Results for orders placed or performed in visit on 04/26/23   IN OFFICE EKG 12-LEAD (to [place])    Collection Time: 04/26/23 10:27 AM    Narrative    Test Reason : z00.0    Vent. Rate : 061 BPM     Atrial Rate : 061 BPM     P-R Int : 122 ms          QRS Dur : 070 ms      QT Int : 464 ms       P-R-T Axes : 041 -28 058 degrees     QTc Int : 467 ms    Normal sinus rhythm  Normal ECG  When compared with ECG of 07-SEP-2022 08:43,  No significant change was found  Confirmed by Alejandra TREADWELL, Collin CADENA ([id number]) on 4/27/2023 7:24:51 AM    Referred By:             Confirmed By:Collin Roberts MD       Most Recent Echocardiogram Results  Results for orders placed during the hospital encounter of 01/31/24    Echo    Interpretation Summary    Left Ventricle: The left ventricle is normal in size. Normal wall thickness. Normal wall motion. There is normal systolic function. Ejection fraction by visual approximation is 65%. There is normal diastolic function.    Right Ventricle: Normal right ventricular cavity size. Wall thickness is normal. Right ventricle wall motion  is normal. Systolic function is normal.    Aortic Valve: The aortic valve is a trileaflet valve.    Pulmonary Artery: The estimated pulmonary artery systolic pressure is 22 mmHg.    IVC/SVC: Normal venous pressure at 3 mmHg.      Most Recent Nuclear Stress Test Results  Results for orders placed during the hospital encounter of 10/19/20    Nuclear Stress - Cardiology Interpreted    Interpretation Summary    The study shows normal myocardial perfusion.    The perfusion scan is free of evidence from myocardial ischemia or injury.    There is a trivial intensity defect in the anteroapical wall of the left ventricle, secondary to breast attenuation.    Visually estimated ejection fraction is normal at  rest.    The EKG portion of this study is negative for ischemia.    The patient reported no chest pain during the stress test.    Arrhythmias during stress: rare PACs.    There are no prior studies for comparison.      Most Recent Cardiac PET Stress Test Results  No results found for this or any previous visit.      Most Recent Cardiovascular Angiogram results  No results found for this or any previous visit.      Other Most Recent Cardiology Results  Results for orders placed in visit on 12/11/24    Cardiac device check - Remote alert        All pertinent data including labs, imaging, EKGs, and studies listed above were reviewed.  Patient's most recent EKG tracing was personally interpreted by this provider.    Diagnoses:       1. Mixed hyperlipidemia    2. Paroxysmal atrial fibrillation    3. Tachy-steve syndrome         Plan for treatment of the above diagnoses:     Assessment & Plan    Continued current AFib management plan, including use of CPAP and as-needed flecainide  Considered possibility of ablation  Ordered echocardiogram to reassess cardiac function    PLAN SUMMARY:  Echocardiogram ordered, to be scheduled at patient's convenience  Continue flecainide as needed for AFib episodes  Continue daily Eliquis  Continue CPAP use   Follow up in about 1 year  Potential for pulse field ablation if symptoms worsen; Dr. Matos mentioned as local electrophysiologist    PLAN NOTE:  Educated patient on new pulse field ablation technology, explaining its efficiency and effectiveness compared to traditional ablation methods.  Discussed potential for ablation with new pulse field ablation technology if symptoms worsen, mentioning Dr. Matos as a local electrophysiologist.  Ms. Cardona to continue using CPAP as patient acknowledges its benefits.  Continued Eliquis daily.  Continued flecainide as needed for AFib episodes.  Echocardiogram ordered.  Follow up in about 1 year.  Schedule echocardiogram at patient's  convenience.         Echocardiogram   Continue Eliquis 5 mg PO BID  Continue flecainide 100 mg PO BID   Continue propranolol 20 mg PO TID   Continue rosuvastatin 10 mg PO Daily    Continue other cardiac medications  Mediterranean Diet/Cardiovascular Exercise Program    Visit today included increased complexity associated with the care of the episodic problem(s) addressed above in addition to managing the longitudinal care of the patient due to the serious and/or complex managed problem(s) listed above.    Parts of this note were transcribed using voice recognition and generative artificial intelligence software ([employer]).  Please excuse any grammatical or syntax errors and reach out to me with any questions or clarifications needed.    Patient queried and all questions were answered.    F/u in 1 year to reassess      Signed:    Collin Roberts MD  1/13/2025 4:30 PM

## 2025-01-11 ENCOUNTER — CLINICAL SUPPORT (OUTPATIENT)
Dept: CARDIOLOGY | Facility: HOSPITAL | Age: 70
End: 2025-01-11
Payer: MEDICARE

## 2025-01-11 ENCOUNTER — HOSPITAL ENCOUNTER (OUTPATIENT)
Dept: CARDIOLOGY | Facility: HOSPITAL | Age: 70
Discharge: HOME | End: 2025-01-11
Attending: INTERNAL MEDICINE
Payer: MEDICARE

## 2025-01-11 DIAGNOSIS — I49.8 OTHER SPECIFIED CARDIAC ARRHYTHMIAS: ICD-10-CM

## 2025-01-11 PROCEDURE — 93298 REM INTERROG DEV EVAL SCRMS: CPT | Mod: PO | Performed by: INTERNAL MEDICINE

## 2025-01-11 PROCEDURE — 93298 REM INTERROG DEV EVAL SCRMS: CPT | Mod: 26,,, | Performed by: INTERNAL MEDICINE

## 2025-01-13 ENCOUNTER — OFFICE VISIT (OUTPATIENT)
Dept: CARDIOLOGY | Facility: CLINIC | Age: 70
End: 2025-01-13
Payer: MEDICARE

## 2025-01-13 VITALS
DIASTOLIC BLOOD PRESSURE: 69 MMHG | SYSTOLIC BLOOD PRESSURE: 122 MMHG | HEART RATE: 67 BPM | WEIGHT: 159.63 LBS | HEIGHT: 65 IN | BODY MASS INDEX: 26.6 KG/M2

## 2025-01-13 DIAGNOSIS — E78.2 MIXED HYPERLIPIDEMIA: Primary | ICD-10-CM

## 2025-01-13 DIAGNOSIS — I48.0 PAROXYSMAL ATRIAL FIBRILLATION: ICD-10-CM

## 2025-01-13 DIAGNOSIS — I49.5 TACHY-BRADY SYNDROME: ICD-10-CM

## 2025-01-13 PROCEDURE — G2211 COMPLEX E/M VISIT ADD ON: HCPCS | Mod: S$PBB,,, | Performed by: INTERNAL MEDICINE

## 2025-01-13 PROCEDURE — 99214 OFFICE O/P EST MOD 30 MIN: CPT | Mod: PBBFAC,PO | Performed by: INTERNAL MEDICINE

## 2025-01-13 PROCEDURE — 99214 OFFICE O/P EST MOD 30 MIN: CPT | Mod: S$PBB,,, | Performed by: INTERNAL MEDICINE

## 2025-01-13 PROCEDURE — 99999 PR PBB SHADOW E&M-EST. PATIENT-LVL IV: CPT | Mod: PBBFAC,,, | Performed by: INTERNAL MEDICINE

## 2025-01-14 ENCOUNTER — PATIENT MESSAGE (OUTPATIENT)
Dept: CARDIOLOGY | Facility: CLINIC | Age: 70
End: 2025-01-14
Payer: MEDICARE

## 2025-01-14 DIAGNOSIS — I48.0 PAROXYSMAL ATRIAL FIBRILLATION: ICD-10-CM

## 2025-01-22 ENCOUNTER — PATIENT MESSAGE (OUTPATIENT)
Dept: CARDIOLOGY | Facility: CLINIC | Age: 70
End: 2025-01-22
Payer: MEDICARE

## 2025-01-24 ENCOUNTER — E-CONSULT (OUTPATIENT)
Dept: CARDIOLOGY | Facility: CLINIC | Age: 70
End: 2025-01-24
Payer: MEDICARE

## 2025-01-24 ENCOUNTER — TELEPHONE (OUTPATIENT)
Dept: GASTROENTEROLOGY | Facility: CLINIC | Age: 70
End: 2025-01-24
Payer: MEDICARE

## 2025-01-24 DIAGNOSIS — E78.2 MIXED HYPERLIPIDEMIA: ICD-10-CM

## 2025-01-24 DIAGNOSIS — I49.5 TACHY-BRADY SYNDROME: ICD-10-CM

## 2025-01-24 DIAGNOSIS — Z79.01 CURRENT USE OF LONG TERM ANTICOAGULATION: Primary | ICD-10-CM

## 2025-01-24 DIAGNOSIS — I48.0 PAROXYSMAL ATRIAL FIBRILLATION: Primary | ICD-10-CM

## 2025-01-24 PROCEDURE — 99451 NTRPROF PH1/NTRNET/EHR 5/>: CPT | Mod: S$PBB,,, | Performed by: INTERNAL MEDICINE

## 2025-01-24 NOTE — TELEPHONE ENCOUNTER
Spoke with patient and advised her that she needs to her the Eliquis rivers 2 days prior to her procedure.     Clearance request to hold eliquis sent to cardiology.

## 2025-01-24 NOTE — CONSULTS
[place] - Cardiology  Response for E-Consult     Patient Name: Tamica Cardona  MRN: [id number]  Primary Care Provider: Tamica Kent NP   Requesting Provider: ALICIA Vallejo MD  E-Consult to General Cardiology  Consult performed by: Winston Matos MD  Consult ordered by: ALICIA Vallejo MD        Recommendation:   The patient's prior cardiovascular workup was reviewed including ECGs, loop recorder, echocardiograms, stress tests, cardiac ablations, cardioversions and/or angiograms (if applicable). They do not have documentation of a cardioversion <1 months, ablation <3 months, or revascularization CABG and/or PCI <1 year. Can hold Eliquis 2 days prior to procedure and resume as soon as possible at the treating physicians discretion.       Additional future steps to consider:  Further recommendations needed    Total time of Consultation: 10 minute    I did not speak to the requesting provider verbally about this.     *This eConsult is based on the clinical data available to me and is furnished without benefit of a physical examination. The eConsult will need to be interpreted in light of any clinical issues or changes in patient status not available to me at the time of filing this eConsults. Significant changes in patient condition or level of acuity should result in immediate formal consultation and reevaluation. Please alert me if you have further questions.    Thank you for this eConsult referral.     Winston Matos MD  [facility] Cardiology

## 2025-01-24 NOTE — TELEPHONE ENCOUNTER
----- Message from EVAN Rivera sent at 1/24/2025 11:52 AM CST -----  Regarding: FW: advice    ----- Message -----  From: Cathy Briceno  Sent: 1/24/2025  11:23 AM CST  To: [facility] Staff  Subject: advice                                           Type:  Needs Medical Advice    Who Called: pt    Best Call Back Number: [phone]      Additional Information: pt st that she is on blood thinners.  She has an upcoming sx. please call to discuss.

## 2025-01-25 ENCOUNTER — HOSPITAL ENCOUNTER (OUTPATIENT)
Dept: CARDIOLOGY | Facility: HOSPITAL | Age: 70
Discharge: HOME | End: 2025-01-25
Attending: INTERNAL MEDICINE
Payer: MEDICARE

## 2025-01-27 ENCOUNTER — TELEPHONE (OUTPATIENT)
Dept: GASTROENTEROLOGY | Facility: CLINIC | Age: 70
End: 2025-01-27
Payer: MEDICARE

## 2025-01-27 LAB
OHS CV AF BURDEN PERCENT: 1.4
OHS CV DC REMOTE DEVICE TYPE: NORMAL

## 2025-01-27 NOTE — TELEPHONE ENCOUNTER
Spoke to pt and rescheduled procedure due to provider book out. Pt verbalized understanding to new procedure date.

## 2025-01-30 LAB
OHS CV AF BURDEN PERCENT: 2.2
OHS CV DC REMOTE DEVICE TYPE: NORMAL
OHS CV ICD SHOCK: NO

## 2025-01-31 ENCOUNTER — HOSPITAL ENCOUNTER (OUTPATIENT)
Dept: CARDIOLOGY | Facility: HOSPITAL | Age: 70
Discharge: HOME | End: 2025-01-31
Attending: INTERNAL MEDICINE
Payer: MEDICARE

## 2025-01-31 VITALS — BODY MASS INDEX: 26.49 KG/M2 | HEART RATE: 59 BPM | WEIGHT: 159 LBS | HEIGHT: 65 IN

## 2025-01-31 DIAGNOSIS — I48.0 PAROXYSMAL ATRIAL FIBRILLATION: ICD-10-CM

## 2025-01-31 PROCEDURE — 93306 TTE W/DOPPLER COMPLETE: CPT | Mod: PO

## 2025-01-31 PROCEDURE — 93306 TTE W/DOPPLER COMPLETE: CPT | Mod: 26,,, | Performed by: INTERNAL MEDICINE

## 2025-02-01 ENCOUNTER — HOSPITAL ENCOUNTER (OUTPATIENT)
Dept: CARDIOLOGY | Facility: HOSPITAL | Age: 70
Discharge: HOME | End: 2025-02-01
Attending: INTERNAL MEDICINE
Payer: MEDICARE

## 2025-02-01 LAB
ASCENDING AORTA: 2.13 CM
AV INDEX (PROSTH): 0.99
AV MEAN GRADIENT: 3 MMHG
AV PEAK GRADIENT: 5 MMHG
AV REGURGITATION PRESSURE HALF TIME: 682 MS
AV VALVE AREA BY VELOCITY RATIO: 2.6 CM²
AV VALVE AREA: 2.8 CM²
AV VELOCITY RATIO: 0.91
BSA FOR ECHO PROCEDURE: 1.82 M2
CV ECHO LV RWT: 0.41 CM
DOP CALC AO PEAK VEL: 1.1 M/S
DOP CALC AO VTI: 27.3 CM
DOP CALC LVOT AREA: 2.8 CM2
DOP CALC LVOT DIAMETER: 1.9 CM
DOP CALC LVOT PEAK VEL: 1 M/S
DOP CALC LVOT STROKE VOLUME: 76.8 CM3
DOP CALCLVOT PEAK VEL VTI: 27.1 CM
E WAVE DECELERATION TIME: 151 MSEC
E/A RATIO: 1.02
E/E' RATIO: 15 M/S
ECHO LV POSTERIOR WALL: 1 CM (ref 0.6–1.1)
EJECTION FRACTION: 60 %
FRACTIONAL SHORTENING: 24.5 % (ref 28–44)
INTERVENTRICULAR SEPTUM: 0.9 CM (ref 0.6–1.1)
IVRT: 106 MSEC
LEFT ATRIUM AREA SYSTOLIC (APICAL 2 CHAMBER): 20.79 CM2
LEFT ATRIUM AREA SYSTOLIC (APICAL 4 CHAMBER): 16.05 CM2
LEFT ATRIUM SIZE: 3.8 CM
LEFT ATRIUM VOLUME INDEX MOD: 31 ML/M2
LEFT ATRIUM VOLUME MOD: 56 ML
LEFT INTERNAL DIMENSION IN SYSTOLE: 3.7 CM (ref 2.1–4)
LEFT VENTRICLE DIASTOLIC VOLUME INDEX: 61.42 ML/M2
LEFT VENTRICLE DIASTOLIC VOLUME: 109.94 ML
LEFT VENTRICLE END SYSTOLIC VOLUME APICAL 2 CHAMBER: 61.03 ML
LEFT VENTRICLE END SYSTOLIC VOLUME APICAL 4 CHAMBER: 42.37 ML
LEFT VENTRICLE MASS INDEX: 91.8 G/M2
LEFT VENTRICLE SYSTOLIC VOLUME INDEX: 31.8 ML/M2
LEFT VENTRICLE SYSTOLIC VOLUME: 56.93 ML
LEFT VENTRICULAR INTERNAL DIMENSION IN DIASTOLE: 4.9 CM (ref 3.5–6)
LEFT VENTRICULAR MASS: 164.3 G
LV LATERAL E/E' RATIO: 12.4 M/S
LV SEPTAL E/E' RATIO: 17.4 M/S
LVED V (TEICH): 109.94 ML
LVES V (TEICH): 56.93 ML
LVOT MG: 2.55 MMHG
LVOT MV: 0.77 CM/S
MV PEAK A VEL: 0.85 M/S
MV PEAK E VEL: 0.87 M/S
MV STENOSIS PRESSURE HALF TIME: 43.91 MS
MV VALVE AREA P 1/2 METHOD: 5.01 CM2
OHS CV RV/LV RATIO: 0.61 CM
PISA AR MAX VEL: 3.17 M/S
PISA MRMAX VEL: 5.9 M/S
PISA TR MAX VEL: 2.6 M/S
PULM VEIN S/D RATIO: 1.33
PV PEAK D VEL: 0.45 M/S
PV PEAK S VEL: 0.6 M/S
RA PRESSURE ESTIMATED: 3 MMHG
RA VOL SYS: 25.85 ML
RIGHT ATRIAL AREA: 11.8 CM2
RIGHT ATRIUM VOLUME AREA LENGTH APICAL 4 CHAMBER: 24.68 ML
RIGHT VENTRICLE DIASTOLIC BASEL DIMENSION: 3 CM
RIGHT VENTRICLE DIASTOLIC LENGTH: 4.1 CM
RIGHT VENTRICLE DIASTOLIC MID DIMENSION: 3.3 CM
RIGHT VENTRICULAR END-DIASTOLIC DIMENSION: 3 CM
RIGHT VENTRICULAR LENGTH IN DIASTOLE (APICAL 4-CHAMBER VIEW): 4.09 CM
RV MID DIAMA: 3.33 CM
RV TB RVSP: 6 MMHG
RV TISSUE DOPPLER FREE WALL SYSTOLIC VELOCITY 1 (APICAL 4 CHAMBER VIEW): 8.52 CM/S
SINUS: 2.43 CM
STJ: 2.01 CM
TDI LATERAL: 0.07 M/S
TDI SEPTAL: 0.05 M/S
TDI: 0.06 M/S
TR MAX PG: 27 MMHG
TRICUSPID ANNULAR PLANE SYSTOLIC EXCURSION: 1.93 CM
TV REST PULMONARY ARTERY PRESSURE: 30 MMHG
Z-SCORE OF LEFT VENTRICULAR DIMENSION IN END DIASTOLE: -0.1
Z-SCORE OF LEFT VENTRICULAR DIMENSION IN END SYSTOLE: 1.51

## 2025-02-11 ENCOUNTER — CLINICAL SUPPORT (OUTPATIENT)
Dept: CARDIOLOGY | Facility: HOSPITAL | Age: 70
End: 2025-02-11
Payer: MEDICARE

## 2025-02-11 ENCOUNTER — HOSPITAL ENCOUNTER (OUTPATIENT)
Dept: CARDIOLOGY | Facility: HOSPITAL | Age: 70
Discharge: HOME | End: 2025-02-11
Attending: INTERNAL MEDICINE
Payer: MEDICARE

## 2025-02-11 DIAGNOSIS — I49.8 OTHER SPECIFIED CARDIAC ARRHYTHMIAS: ICD-10-CM

## 2025-02-11 PROCEDURE — 93298 REM INTERROG DEV EVAL SCRMS: CPT | Mod: PO | Performed by: INTERNAL MEDICINE

## 2025-02-11 PROCEDURE — 93298 REM INTERROG DEV EVAL SCRMS: CPT | Mod: 26,,, | Performed by: INTERNAL MEDICINE

## 2025-02-14 ENCOUNTER — E-CONSULT (OUTPATIENT)
Dept: CARDIOLOGY | Facility: CLINIC | Age: 70
End: 2025-02-14
Payer: MEDICARE

## 2025-02-14 ENCOUNTER — TELEPHONE (OUTPATIENT)
Dept: SURGERY | Facility: HOSPITAL | Age: 70
End: 2025-02-14
Payer: MEDICARE

## 2025-02-14 DIAGNOSIS — Z01.818 PRE-OP EXAM: Primary | ICD-10-CM

## 2025-02-14 DIAGNOSIS — I48.0 PAROXYSMAL ATRIAL FIBRILLATION: Primary | ICD-10-CM

## 2025-02-14 DIAGNOSIS — Z01.810 PRE-OPERATIVE CARDIOVASCULAR EXAMINATION: ICD-10-CM

## 2025-02-14 NOTE — CONSULTS
[facility]    Response for E-Consult     Patient Name: Tamica Cardona  MRN: [id number]  Primary Care Provider: Tamica Kent NP   Requesting Provider: Galen Flores Jr., *  E-Consult to General Cardiology  Consult performed by: Collin Roberts MD  Consult ordered by: Galen Flores Jr., MD        Chart reviewed personally.    As long as no significant chest pain or shortness of breath with exertion, patient is at acceptable risk to proceed from a Cardiology standpoint.    No evidence seen in Epic of DCCV within the last month.    Okay to hold Eliquis 48 hours prior to procedure, restart as soon as safe postprocedure.    Additional future steps to consider: NA    Total time of Consultation: 5 minute    I did not speak to the requesting provider verbally about this.     *This eConsult is based on the clinical data available to me and is furnished without benefit of a physical examination. The eConsult will need to be interpreted in light of any clinical issues or changes in patient status not available to me at the time of filing this eConsults. Significant changes in patient condition or level of acuity should result in immediate formal consultation and reevaluation. Please alert me if you have further questions.    Thank you for this eConsult referral.     Collin Roberts MD  [facility]

## 2025-02-14 NOTE — TELEPHONE ENCOUNTER
Pt cardiology e-consult shows date of 1/24 from Yoni Mejia but she has been rescheduled with Dr. Camp for 2/20 and has had some other cardio tests done. Did you need a new cardiac clearance?  Last one shows OK to hold blood thinners for 2 days.  Please advise.

## 2025-02-17 LAB
OHS CV AF BURDEN PERCENT: < 1
OHS CV DC REMOTE DEVICE TYPE: NORMAL

## 2025-02-20 ENCOUNTER — CLINICAL SUPPORT (OUTPATIENT)
Dept: CARDIOLOGY | Facility: HOSPITAL | Age: 70
End: 2025-02-20
Payer: MEDICARE

## 2025-02-20 ENCOUNTER — HOSPITAL ENCOUNTER (OUTPATIENT)
Facility: HOSPITAL | Age: 70
Discharge: HOME | End: 2025-02-20
Attending: SURGERY | Admitting: SURGERY
Payer: MEDICARE

## 2025-02-20 ENCOUNTER — ANESTHESIA (OUTPATIENT)
Dept: ENDOSCOPY | Facility: HOSPITAL | Age: 70
End: 2025-02-20
Payer: MEDICARE

## 2025-02-20 ENCOUNTER — HOSPITAL ENCOUNTER (OUTPATIENT)
Dept: CARDIOLOGY | Facility: HOSPITAL | Age: 70
Discharge: HOME | End: 2025-02-20
Attending: INTERNAL MEDICINE
Payer: MEDICARE

## 2025-02-20 ENCOUNTER — ANESTHESIA EVENT (OUTPATIENT)
Dept: ENDOSCOPY | Facility: HOSPITAL | Age: 70
End: 2025-02-20
Payer: MEDICARE

## 2025-02-20 DIAGNOSIS — Z12.11 ENCOUNTER FOR SCREENING COLONOSCOPY: Primary | ICD-10-CM

## 2025-02-20 DIAGNOSIS — I49.8 OTHER SPECIFIED CARDIAC ARRHYTHMIAS: ICD-10-CM

## 2025-02-20 PROCEDURE — 63600175 PHARM REV CODE 636 W HCPCS: Mod: PO | Performed by: SURGERY

## 2025-02-20 PROCEDURE — 45385 COLONOSCOPY W/LESION REMOVAL: CPT | Mod: PT,,, | Performed by: SURGERY

## 2025-02-20 PROCEDURE — 37000008 HC ANESTHESIA 1ST 15 MINUTES: Mod: PO | Performed by: SURGERY

## 2025-02-20 PROCEDURE — 37000009 HC ANESTHESIA EA ADD 15 MINS: Mod: PO | Performed by: SURGERY

## 2025-02-20 PROCEDURE — 88305 TISSUE EXAM BY PATHOLOGIST: CPT | Mod: 26,,, | Performed by: PATHOLOGY

## 2025-02-20 PROCEDURE — 27201089 HC SNARE, DISP (ANY): Mod: PO | Performed by: SURGERY

## 2025-02-20 PROCEDURE — D9220A PRA ANESTHESIA: Mod: PT,CRNA,, | Performed by: NURSE ANESTHETIST, CERTIFIED REGISTERED

## 2025-02-20 PROCEDURE — D9220A PRA ANESTHESIA: Mod: PT,ANES,, | Performed by: ANESTHESIOLOGY

## 2025-02-20 PROCEDURE — 63600175 PHARM REV CODE 636 W HCPCS: Mod: PO | Performed by: NURSE ANESTHETIST, CERTIFIED REGISTERED

## 2025-02-20 PROCEDURE — 45385 COLONOSCOPY W/LESION REMOVAL: CPT | Mod: PT,PO | Performed by: SURGERY

## 2025-02-20 PROCEDURE — 88305 TISSUE EXAM BY PATHOLOGIST: CPT | Mod: PO | Performed by: PATHOLOGY

## 2025-02-20 RX ORDER — PROPOFOL 10 MG/ML
VIAL (ML) INTRAVENOUS CONTINUOUS PRN
Status: DISCONTINUED | OUTPATIENT
Start: 2025-02-20 | End: 2025-02-25

## 2025-02-20 RX ORDER — LIDOCAINE HYDROCHLORIDE 20 MG/ML
INJECTION INTRAVENOUS
Status: DISCONTINUED | OUTPATIENT
Start: 2025-02-20 | End: 2025-02-25

## 2025-02-20 RX ORDER — SODIUM CHLORIDE, SODIUM LACTATE, POTASSIUM CHLORIDE, CALCIUM CHLORIDE 600; 310; 30; 20 MG/100ML; MG/100ML; MG/100ML; MG/100ML
INJECTION, SOLUTION INTRAVENOUS CONTINUOUS
Status: DISCONTINUED | OUTPATIENT
Start: 2025-02-20 | End: 2025-02-20 | Stop reason: HOSPADM

## 2025-02-20 RX ORDER — PROPOFOL 10 MG/ML
VIAL (ML) INTRAVENOUS
Status: DISCONTINUED | OUTPATIENT
Start: 2025-02-20 | End: 2025-02-25

## 2025-02-20 RX ADMIN — PROPOFOL 140 MG: 10 INJECTION, EMULSION INTRAVENOUS at 08:02

## 2025-02-20 RX ADMIN — LIDOCAINE HYDROCHLORIDE 50 MG: 20 INJECTION INTRAVENOUS at 08:02

## 2025-02-20 RX ADMIN — PROPOFOL 150 MCG/KG/MIN: 10 INJECTION, EMULSION INTRAVENOUS at 08:02

## 2025-02-20 RX ADMIN — SODIUM CHLORIDE, POTASSIUM CHLORIDE, SODIUM LACTATE AND CALCIUM CHLORIDE: 600; 310; 30; 20 INJECTION, SOLUTION INTRAVENOUS at 07:02

## 2025-02-20 NOTE — PROVATION PATIENT INSTRUCTIONS
Discharge Summary/Instructions after an Endoscopic Procedure  Patient Name: Tamica Cardona  Patient MRN: [id number]  Patient YOB: 1955 Thursday, February 20, 2025  Winston Camp MD  Dear patient,  As a result of recent federal legislation (The Federal Cures Act), you may   receive lab or pathology results from your procedure in your [facility]   account before your physician is able to contact you. Your physician or   their representative will relay the results to you with their   recommendations at their soonest availability.  Thank you,  RESTRICTIONS:  During your procedure today, you received medications for sedation.  These   medications may affect your judgment, balance and coordination.  Therefore,   for 24 hours, you have the following restrictions:   - DO NOT drive a car, operate machinery, make legal/financial decisions,   sign important papers or drink alcohol.    ACTIVITY:  Today: no heavy lifting, straining or running due to procedural   sedation/anesthesia.  The following day: return to full activity including work.  DIET:  Eat and drink normally unless instructed otherwise.     TREATMENT FOR COMMON SIDE EFFECTS:  - Mild abdominal pain, nausea, belching, bloating or excessive gas:  rest,   eat lightly and use a heating pad.  - Sore Throat: treat with throat lozenges and/or gargle with warm salt   water.  - Because air was used during the procedure, expelling large amounts of air   from your rectum or belching is normal.  - If a bowel prep was taken, you may not have a bowel movement for 1-3 days.    This is normal.  SYMPTOMS TO WATCH FOR AND REPORT TO YOUR PHYSICIAN:  1. Abdominal pain or bloating, other than gas cramps.  2. Chest pain.  3. Back pain.  4. Signs of infection such as: chills or fever occurring within 24 hours   after the procedure.  5. Rectal bleeding, which would show as bright red, maroon, or black stools.   (A tablespoon of blood from the rectum is not serious, especially  if   hemorrhoids are present.)  6. Vomiting.  7. Weakness or dizziness.  GO DIRECTLY TO THE NEAREST EMERGENCY ROOM IF YOU HAVE ANY OF THE FOLLOWING:      Difficulty breathing              Chills and/or fever over 101 F   Persistent vomiting and/or vomiting blood   Severe abdominal pain   Severe chest pain   Black, tarry stools   Bleeding- more than one tablespoon   Any other symptom or condition that you feel may need urgent attention  Your doctor recommends these additional instructions:  If any biopsies were taken, your doctors clinic will contact you in 1 to 2   weeks with any results.  You are being discharged to home.   Resume your regular diet.   Continue your present medications.   Resume taking Eliquis (apixaban) at your prior dose in 2 days.   Your physician has recommended a repeat colonoscopy in five years for   surveillance.   Return to my office as needed.  For questions, problems or results please call your physician - Winston Camp MD at Work:  [phone].  EMERGENCY PHONE NUMBER: [phone], LAB RESULTS: [phone]  IF A COMPLICATION OR EMERGENCY SITUATION ARISES AND YOU ARE UNABLE TO REACH   YOUR PHYSICIAN - GO DIRECTLY TO THE EMERGENCY ROOM.  ___________________________________________  Nurse Signature  ___________________________________________  Patient/Designated Responsible Party Signature  Winston Camp MD  2/20/2025 8:23:09 AM  This report has been verified and signed electronically.  Dear patient,  As a result of recent federal legislation (The Federal Cures Act), you may   receive lab or pathology results from your procedure in your [facility]   account before your physician is able to contact you. Your physician or   their representative will relay the results to you with their   recommendations at their soonest availability.  Thank you.  PROVATION

## 2025-02-20 NOTE — ANESTHESIA PREPROCEDURE EVALUATION
02/20/2025  Tamica Cardona is a 69 y.o., female.    Anesthesia Evaluation    I have reviewed the Patient Summary Reports.     I have reviewed the Nursing Notes. I have reviewed the NPO Status.   I have reviewed the Medications.     Review of Systems  Anesthesia Hx:  No problems with previous Anesthesia                Hematology/Oncology:       -- Anemia:                    --  Cancer in past history:                     Cardiovascular:      Denies Hypertension.   Denies MI.  Denies CAD.    Denies CABG/stent. Dysrhythmias atrial fibrillation  Denies Angina.     hyperlipidemia   ECG has been reviewed. PAF;on Eliquis                           Pulmonary:    Denies COPD.  Denies Asthma.   Denies Shortness of breath.  Denies Recent URI. Sleep Apnea                Renal/:   Denies Chronic Renal Disease.   IS             Hepatic/GI:  Bowel Prep.    Denies GERD. Denies Liver Disease.               Neurological:  Denies TIA.  Denies CVA.    Denies Seizures.                                Endocrine:  Denies Diabetes. Denies Hypothyroidism.          Psych:  Psychiatric History anxiety depression                Physical Exam  General:  Well nourished       Airway/Jaw/Neck:  Airway Findings: Mouth Opening: Normal     Tongue: Normal      General Airway Assessment: Adult, Good      Mallampati: II  Improves to II with phonation.  TM Distance: 4-6 cm                Chest/Lungs:  Chest/Lungs Findings:  Clear to auscultation, Normal Respiratory Rate       Heart/Vascular:  Heart Findings: Rate: Normal  Rhythm: Regular Rhythm  Sounds: Normal  Heart murmur: negative                     Mental Status:  Mental Status Findings:  Cooperative, Alert and Oriented         Anesthesia Plan  Type of Anesthesia, risks & benefits discussed:  Anesthesia Type:  general    Patient's Preference:   Plan Factors:          Intra-op Monitoring  Plan: standard ASA monitors  Intra-op Monitoring Plan Comments:   Post Op Pain Control Plan:   Post Op Pain Control Plan Comments:     Induction:   IV  Beta Blocker:         Informed Consent: Informed consent signed with the Patient and all parties understand the risks and agree with anesthesia plan.  All questions answered.  Anesthesia consent signed with patient.  ASA Score: 3     Day of Surgery Review of History & Physical:  There are no significant changes.            Ready For Surgery From Anesthesia Perspective.             Physical Exam  General: Well nourished    Airway:  Mallampati: II / II  Mouth Opening: Normal  TM Distance: 4-6 cm  Tongue: Normal    Chest/Lungs:  Clear to auscultation, Normal Respiratory Rate    Heart:  Rate: Normal  Rhythm: Regular Rhythm  Sounds: Normal        Anesthesia Plan  Type of Anesthesia, risks & benefits discussed:    Anesthesia Type: general  Intra-op Monitoring Plan: standard ASA monitors  Induction:  IV  Informed Consent: Informed consent signed with the Patient and all parties understand the risks and agree with anesthesia plan.  All questions answered.   ASA Score: 3    Ready For Surgery From Anesthesia Perspective.     .

## 2025-02-20 NOTE — TRANSFER OF CARE
"Anesthesia Transfer of Care Note    Patient: Tamica Cardona    Procedure(s) Performed: Procedure(s) (LRB):  COLONOSCOPY, SCREENING, LOW RISK PATIENT (N/A)    Patient location: PACU    Anesthesia Type: general    Transport from OR: Transported from OR on room air with adequate spontaneous ventilation    Post pain: adequate analgesia    Post assessment: no apparent anesthetic complications and tolerated procedure well    Post vital signs: stable    Level of consciousness: sedated    Nausea/Vomiting: no nausea/vomiting    Complications: none    Transfer of care protocol was followed      Last vitals: Visit Vitals  BP (!) 112/58   Pulse 87   Temp 36.5 °C (97.7 °F) (Temporal)   Resp 18   Ht 5' 5" (1.651 m)   Wt 70.8 kg (156 lb)   SpO2 95%   Breastfeeding No   BMI 25.96 kg/m²     "

## 2025-02-20 NOTE — H&P
[facility] - Endoscopy  History & Physical     Subjective:     Chief Complaint/Reason for Admission: screening colonoscopy    History of Present Illness:   Patient 69 y.o. female presents for screening colonoscopy.  PT notes that she has not had a cscope in 10 years.  She denies abd pain or changes in bowel habits.  She has history of a fib.  Takes eliquis which she stopped 4 days ago.  PShx significnat for hysterectomy.     Patient Active Problem List    Diagnosis Date Noted    Nuclear sclerotic cataract of right eye 08/08/2024    Nuclear sclerosis of both eyes 05/08/2024    Decreased ROM of trunk and back 08/11/2023    Benign familial tremor 10/10/2022    Obstructive sleep apnea syndrome 04/23/2022    Tachy-steve syndrome 04/23/2022    Pain in right foot 03/27/2021    Plantar fasciitis 02/22/2021    Heel pain 02/22/2021    Equinus contracture of ankle 02/22/2021    Calcaneal spur, right foot 02/22/2021    Paroxysmal atrial fibrillation 10/14/2020    Anxiety disorder, unspecified 06/16/2020    Neutropenia     Chronic midline low back pain without sciatica 02/01/2019    Plaque psoriasis 10/03/2016    Nonintractable episodic headache 10/03/2016    Bilateral hearing loss 10/03/2016    History of breast cancer 11/03/2015    Mixed hyperlipidemia 08/18/2015    Iron deficiency anemia 08/18/2015    Osteopenia 08/18/2015    Retinal tear 02/27/2015    Retinal hole of left eye 02/27/2015    Interstitial cystitis 08/11/2014    Visual field scotoma 04/18/2012    Depression 04/18/2012        Prescriptions Prior to Admission[1]  Review of patient's allergies indicates:   Allergen Reactions    Thimerosal Swelling    Erythromycin base      Other reaction(s): Vomiting  Other reaction(s): Stomach upset  Other reaction(s): Nausea    Penicillins      Other reaction(s): convulsions        Past Medical History:   Diagnosis Date    Allergy     Anxiety     Atrial fibrillation 10/12/2020    Breast cancer 11/2003    right    Depression      General anesthetics causing adverse effect in therapeutic use     slow to wake-- easily sedated    History of UTI     Interstitial cystitis 2003    Neutropenia     Sleep apnea     Streptococcal pharyngitis 01/05/2024      Past Surgical History:   Procedure Laterality Date    BREAST BIOPSY Left 2006    benign    CATARACT EXTRACTION W/  INTRAOCULAR LENS IMPLANT Right 8/8/2024    Procedure: EXTRACTION, CATARACT, WITH IOL INSERTION;  Surgeon: Nik Loco MD;  Location: [facility] OR;  Service: Ophthalmology;  Laterality: Right;    CYSTOSCOPY  2003    EYE SURGERY  01/2015    HYSTERECTOMY      TVH, ovaries intact. Uterine prolapse    INSERTION OF IMPLANTABLE LOOP RECORDER Left 02/08/2021    Procedure: Insertion, Implantable Loop Recorder;  Surgeon: Collin Roberts MD;  Location: [facility] CATH;  Service: Cardiology;  Laterality: Left;    KNEE ARTHROSCOPY W/ MENISCECTOMY  1992    LT    left breast biopsy  2006    benign    MASTECTOMY, RADICAL Right 12/2003    RETINAL DETACHMENT SURGERY Right         Social History     Tobacco Use    Smoking status: Never    Smokeless tobacco: Never   Substance Use Topics    Alcohol use: No        Review of Systems:  A comprehensive review of systems was negative.    OBJECTIVE:     Patient Vitals for the past 8 hrs:   BP Temp Temp src Pulse Resp SpO2   02/20/25 0724 118/75 97.7 °F (36.5 °C) Temporal 87 18 95 %     AAOx3  Sinus  Soft/nd/nt  No resp distress    Data Review:      ASSESSMENT/PLAN:     There are no hospital problems to display for this patient.  Screening cscope    Plan:  TO have cscope today         [1]   Facility-Administered Medications Prior to Admission   Medication Dose Route Frequency Provider Last Rate Last Admin    sodium chloride 0.9% flush 10 mL  10 mL Intravenous PRN Nik Loco MD         Medications Prior to Admission   Medication Sig Dispense Refill Last Dose/Taking    apixaban (ELIQUIS) 5 mg Tab Take 1 tablet (5 mg total) by mouth 2 (two) times daily. 180 tablet  1 2/17/2025 Morning    calcium carbonate (OS-DESTINY) 600 mg (1,500 mg) Tab Take 600 mg by mouth 2 (two) times daily with meals.   2/17/2025 Morning    EScitalopram oxalate (LEXAPRO) 10 MG tablet TAKE ONE + ONE-HALF TABLETS BY MOUTH EVERY  tablet 3 2/17/2025 Morning    ferrous gluconate (FERGON) 325 MG tablet Take 45 mg by mouth daily with breakfast.   2/17/2025 Morning    flecainide (TAMBOCOR) 100 MG Tab Take 1 tablet (100 mg total) by mouth every 12 (twelve) hours as needed. 60 tablet 11 Past Week    fluticasone propionate (FLONASE) 50 mcg/actuation nasal spray    Past Week    propranoloL (INDERAL) 20 MG tablet TAKE 1 TABLET BY MOUTH THREE TIMES A  tablet 1 Past Week    rosuvastatin (CRESTOR) 10 MG tablet Take 1 tablet (10 mg total) by mouth once daily. 90 tablet 3 2/17/2025 Morning    vitamin D (VITAMIN D3) 1000 units Tab Take 1,000 Units by mouth once daily.   2/17/2025 Morning    acetaminophen (TYLENOL) 500 MG tablet Take 750 mg by mouth every 6 (six) hours as needed for Pain.   More than a month    clonazePAM (KLONOPIN) 0.5 MG tablet Take 1/2 to 1 tablet daily as needed for anxiety 30 tablet 0 More than a month    fexofenadine (ALLEGRA ALLERGY) 180 MG tablet 180 mg.   More than a month    loratadine (CLARITIN) 10 mg tablet Take 10 mg by mouth daily as needed.    More than a month    meclizine (ANTIVERT) 25 mg tablet Take 1 tablet (25 mg total) by mouth 3 (three) times daily as needed. 30 tablet 1 More than a month    prednisolon/gatiflox/bromfenac (PREDNISOL ACE-GATIFLOX-BROMFEN) 1-0.5-0.075 % DrpS Apply 1 drop to eye 3 (three) times daily. in operative eye for 1 month after surgery 5 mL 3

## 2025-02-20 NOTE — TRANSFER OF CARE
"Anesthesia Transfer of Care Note    Patient: Tamica Cardona    Procedure(s) Performed: Procedure(s) (LRB):  COLONOSCOPY, SCREENING, LOW RISK PATIENT (N/A)    Patient location: PACU    Anesthesia Type: general    Transport from OR: Transported from OR on room air with adequate spontaneous ventilation    Post pain: adequate analgesia    Post assessment: no apparent anesthetic complications and tolerated procedure well    Post vital signs: stable    Level of consciousness: sedated    Nausea/Vomiting: no nausea/vomiting    Complications: none    Transfer of care protocol was followed      Last vitals: Visit Vitals  BP (!) 112/58   Pulse 69   Temp 36.5 °C (97.7 °F) (Temporal)   Resp 18   Ht 5' 5" (1.651 m)   Wt 70.8 kg (156 lb)   SpO2 95%   Breastfeeding No   BMI 25.96 kg/m²     "

## 2025-02-21 VITALS
HEIGHT: 65 IN | BODY MASS INDEX: 25.99 KG/M2 | TEMPERATURE: 98 F | WEIGHT: 156 LBS | RESPIRATION RATE: 17 BRPM | DIASTOLIC BLOOD PRESSURE: 62 MMHG | HEART RATE: 100 BPM | OXYGEN SATURATION: 95 % | SYSTOLIC BLOOD PRESSURE: 120 MMHG

## 2025-02-21 DIAGNOSIS — Z00.00 ENCOUNTER FOR MEDICARE ANNUAL WELLNESS EXAM: ICD-10-CM

## 2025-02-21 NOTE — TRANSFER OF CARE
"Anesthesia Transfer of Care Note    Patient: Tamica Cardona    Procedure(s) Performed: Procedure(s) (LRB):  COLONOSCOPY, SCREENING, LOW RISK PATIENT (N/A)    Patient location: PACU    Anesthesia Type: general    Transport from OR: Transported from OR on room air with adequate spontaneous ventilation    Post pain: adequate analgesia    Post assessment: no apparent anesthetic complications and tolerated procedure well    Post vital signs: stable    Level of consciousness: sedated    Nausea/Vomiting: no nausea/vomiting    Complications: none    Transfer of care protocol was followed      Last vitals: Visit Vitals  /62   Pulse 100   Temp 36.5 °C (97.7 °F) (Temporal)   Resp 17   Ht 5' 5" (1.651 m)   Wt 70.8 kg (156 lb)   SpO2 95%   Breastfeeding No   BMI 25.96 kg/m²     "

## 2025-02-24 LAB
FINAL PATHOLOGIC DIAGNOSIS: NORMAL
GROSS: NORMAL
Lab: NORMAL
OHS CV AF BURDEN PERCENT: 3.1
OHS CV DC REMOTE DEVICE TYPE: NORMAL
OHS CV ICD SHOCK: NO

## 2025-02-25 NOTE — ADDENDUM NOTE
Addendum  created 02/25/25 0950 by Amy Ceja CRNA    Intraprocedure Event edited, Intraprocedure Staff edited

## 2025-02-25 NOTE — ANESTHESIA POSTPROCEDURE EVALUATION
Anesthesia Post Evaluation    Patient: Tamica Cardona    Procedure(s) Performed: Procedure(s) (LRB):  COLONOSCOPY, SCREENING, LOW RISK PATIENT (N/A)    Final Anesthesia Type: general      Patient location during evaluation: PACU  Patient participation: Yes- Able to Participate  Level of consciousness: sedated and awake  Post-procedure vital signs: reviewed and stable  Pain management: adequate  Airway patency: patent    PONV status at discharge: No PONV  Anesthetic complications: no      Cardiovascular status: blood pressure returned to baseline and hemodynamically stable  Respiratory status: spontaneous ventilation  Hydration status: euvolemic  Follow-up not needed.              Vitals Value Taken Time   /62 02/20/25 08:33   Temp  02/25/25 08:22   Pulse 100 02/20/25 08:33   Resp 17 02/20/25 08:33   SpO2 95 % 02/20/25 08:33         Event Time   Out of Recovery 08:53:26         Pain/Do Score: No data recorded

## 2025-03-05 ENCOUNTER — CLINICAL SUPPORT (OUTPATIENT)
Dept: CARDIOLOGY | Facility: HOSPITAL | Age: 70
End: 2025-03-05
Payer: MEDICARE

## 2025-03-05 ENCOUNTER — HOSPITAL ENCOUNTER (OUTPATIENT)
Dept: CARDIOLOGY | Facility: HOSPITAL | Age: 70
Discharge: HOME | End: 2025-03-05
Attending: INTERNAL MEDICINE
Payer: MEDICARE

## 2025-03-05 DIAGNOSIS — I49.8 OTHER SPECIFIED CARDIAC ARRHYTHMIAS: ICD-10-CM

## 2025-03-07 ENCOUNTER — CLINICAL SUPPORT (OUTPATIENT)
Dept: CARDIOLOGY | Facility: HOSPITAL | Age: 70
End: 2025-03-07
Payer: MEDICARE

## 2025-03-07 ENCOUNTER — HOSPITAL ENCOUNTER (OUTPATIENT)
Dept: CARDIOLOGY | Facility: HOSPITAL | Age: 70
Discharge: HOME | End: 2025-03-07
Attending: INTERNAL MEDICINE
Payer: MEDICARE

## 2025-03-07 DIAGNOSIS — I49.8 OTHER SPECIFIED CARDIAC ARRHYTHMIAS: ICD-10-CM

## 2025-03-10 LAB
OHS CV AF BURDEN PERCENT: < 1
OHS CV DC REMOTE DEVICE TYPE: NORMAL
OHS CV ICD SHOCK: NO

## 2025-03-11 LAB
OHS CV AF BURDEN PERCENT: 2.4
OHS CV DC REMOTE DEVICE TYPE: NORMAL
OHS CV ICD SHOCK: NO

## 2025-03-14 ENCOUNTER — HOSPITAL ENCOUNTER (OUTPATIENT)
Dept: CARDIOLOGY | Facility: HOSPITAL | Age: 70
Discharge: HOME | End: 2025-03-14
Attending: INTERNAL MEDICINE
Payer: MEDICARE

## 2025-03-14 ENCOUNTER — TELEPHONE (OUTPATIENT)
Dept: SURGERY | Facility: HOSPITAL | Age: 70
End: 2025-03-14

## 2025-03-14 ENCOUNTER — RESULTS FOLLOW-UP (OUTPATIENT)
Dept: SURGERY | Facility: HOSPITAL | Age: 70
End: 2025-03-14

## 2025-03-14 ENCOUNTER — CLINICAL SUPPORT (OUTPATIENT)
Dept: CARDIOLOGY | Facility: HOSPITAL | Age: 70
End: 2025-03-14
Payer: MEDICARE

## 2025-03-14 DIAGNOSIS — I49.8 OTHER SPECIFIED CARDIAC ARRHYTHMIAS: ICD-10-CM

## 2025-03-14 DIAGNOSIS — E78.2 MIXED HYPERLIPIDEMIA: ICD-10-CM

## 2025-03-14 PROCEDURE — 93298 REM INTERROG DEV EVAL SCRMS: CPT | Mod: 26,,, | Performed by: INTERNAL MEDICINE

## 2025-03-14 PROCEDURE — 93298 REM INTERROG DEV EVAL SCRMS: CPT | Mod: PO | Performed by: INTERNAL MEDICINE

## 2025-03-14 RX ORDER — ROSUVASTATIN CALCIUM 10 MG/1
10 TABLET, COATED ORAL DAILY
Qty: 90 TABLET | Refills: 1 | Status: SHIPPED | OUTPATIENT
Start: 2025-03-14

## 2025-03-14 NOTE — TELEPHONE ENCOUNTER
Called and reviewed pathology with pt.      COLON, HEPATIC FLEXURE, POLYP, BIOPSY:   - Tubular adenoma     Recommend repeat cscope in 5 years

## 2025-03-17 LAB
OHS CV AF BURDEN PERCENT: < 1
OHS CV DC REMOTE DEVICE TYPE: NORMAL
OHS CV ICD SHOCK: NO

## 2025-03-29 ENCOUNTER — CLINICAL SUPPORT (OUTPATIENT)
Dept: CARDIOLOGY | Facility: HOSPITAL | Age: 70
End: 2025-03-29
Payer: MEDICARE

## 2025-03-29 ENCOUNTER — HOSPITAL ENCOUNTER (OUTPATIENT)
Dept: CARDIOLOGY | Facility: HOSPITAL | Age: 70
Discharge: HOME | End: 2025-03-29
Attending: INTERNAL MEDICINE
Payer: MEDICARE

## 2025-03-29 DIAGNOSIS — I49.8 OTHER SPECIFIED CARDIAC ARRHYTHMIAS: ICD-10-CM

## 2025-04-06 ENCOUNTER — HOSPITAL ENCOUNTER (OUTPATIENT)
Dept: CARDIOLOGY | Facility: HOSPITAL | Age: 70
Discharge: HOME | End: 2025-04-06
Attending: INTERNAL MEDICINE
Payer: MEDICARE

## 2025-04-06 ENCOUNTER — CLINICAL SUPPORT (OUTPATIENT)
Dept: CARDIOLOGY | Facility: HOSPITAL | Age: 70
End: 2025-04-06
Payer: MEDICARE

## 2025-04-06 DIAGNOSIS — I49.8 OTHER SPECIFIED CARDIAC ARRHYTHMIAS: ICD-10-CM

## 2025-04-07 ENCOUNTER — TELEPHONE (OUTPATIENT)
Dept: CARDIOLOGY | Facility: HOSPITAL | Age: 70
End: 2025-04-07
Payer: MEDICARE

## 2025-04-07 NOTE — TELEPHONE ENCOUNTER
"Call placed to patient to follow-up on notification received from   Patient states abe felt heart beating fast "up in throat"  Took Flecainide x1 Friday evening, Sat evening, and again this afternoon 4/7  Patient states she is back to baseline and at this time does not wish to schedule in clinic, but plans to call for an in clinic appt if becomes more frequent    Eliquis 5mg BID  Flecainide 100mg BID PRN  Propranolol 20mg daily    Friday evening, 4/4/25 16:05pm AF 1hr MVR 140bpm    Saturday, 4/5/25 AF with RVR longest single 58mins MVR 136bpm    Tachy event, AF with RVR 2mins 26sec MVR 200bpm      Monday, 4/7/25 multiple episodes noted 10:15- 12:45, PAF with RVR, longest single 36mins MVR 128bpm     "

## 2025-04-14 ENCOUNTER — HOSPITAL ENCOUNTER (OUTPATIENT)
Dept: CARDIOLOGY | Facility: HOSPITAL | Age: 70
Discharge: HOME | End: 2025-04-14
Attending: INTERNAL MEDICINE
Payer: MEDICARE

## 2025-04-14 ENCOUNTER — CLINICAL SUPPORT (OUTPATIENT)
Dept: CARDIOLOGY | Facility: HOSPITAL | Age: 70
End: 2025-04-14
Payer: MEDICARE

## 2025-04-14 DIAGNOSIS — I49.8 OTHER SPECIFIED CARDIAC ARRHYTHMIAS: ICD-10-CM

## 2025-04-14 LAB
OHS CV AF BURDEN PERCENT: 1.2
OHS CV AF BURDEN PERCENT: 4.8
OHS CV DC REMOTE DEVICE TYPE: NORMAL
OHS CV DC REMOTE DEVICE TYPE: NORMAL
OHS CV ICD SHOCK: NO
OHS CV ICD SHOCK: NO

## 2025-04-14 PROCEDURE — 93298 REM INTERROG DEV EVAL SCRMS: CPT | Mod: PO | Performed by: INTERNAL MEDICINE

## 2025-04-14 PROCEDURE — 93298 REM INTERROG DEV EVAL SCRMS: CPT | Mod: 26,,, | Performed by: INTERNAL MEDICINE

## 2025-04-19 ENCOUNTER — CLINICAL SUPPORT (OUTPATIENT)
Dept: CARDIOLOGY | Facility: HOSPITAL | Age: 70
End: 2025-04-19
Payer: MEDICARE

## 2025-04-19 ENCOUNTER — HOSPITAL ENCOUNTER (OUTPATIENT)
Dept: CARDIOLOGY | Facility: HOSPITAL | Age: 70
Discharge: HOME | End: 2025-04-19
Attending: INTERNAL MEDICINE
Payer: MEDICARE

## 2025-04-19 DIAGNOSIS — I49.8 OTHER SPECIFIED CARDIAC ARRHYTHMIAS: ICD-10-CM

## 2025-04-21 LAB
OHS CV AF BURDEN PERCENT: 1.7
OHS CV DC REMOTE DEVICE TYPE: NORMAL
OHS CV ICD SHOCK: NO

## 2025-04-25 ENCOUNTER — PATIENT MESSAGE (OUTPATIENT)
Dept: CARDIOLOGY | Facility: HOSPITAL | Age: 70
End: 2025-04-25
Payer: MEDICARE

## 2025-04-30 NOTE — TELEPHONE ENCOUNTER
AF w/ RVR noted during ILR device remote check:    Overall burden:  1.8% 4/7/25-4/24/25    Max duration seen: 3 hrs. 26 mins. On 4/18 mean V rate 115 bpm, EGM illustrates PAF                      Most recent episode: 4/19    Ventricular rates during AF:    V rates not in AF:        Anticoagulation status:  Eliquis 5 mg bid      Meds:  Eliquis 5 mg bid  Propanolol 20 mg three times a day  Flecainide 100 mg every 12 hrs. As needed      Patient symptoms:per my chart message:I dont remember anything causing that episode. I must have been sleeping. No changes to my medications. Thanks for checking.         Message sent to Dr. Roberts for review

## 2025-05-01 ENCOUNTER — OFFICE VISIT (OUTPATIENT)
Dept: OPTOMETRY | Facility: CLINIC | Age: 70
End: 2025-05-01
Payer: MEDICARE

## 2025-05-01 DIAGNOSIS — H25.12 NUCLEAR SCLEROSIS, LEFT: Primary | ICD-10-CM

## 2025-05-01 DIAGNOSIS — Z13.5 GLAUCOMA SCREENING: ICD-10-CM

## 2025-05-01 DIAGNOSIS — H31.003 CHORIORETINAL SCAR, BILATERAL: ICD-10-CM

## 2025-05-01 DIAGNOSIS — H43.813 POSTERIOR VITREOUS DETACHMENT, BILATERAL: ICD-10-CM

## 2025-05-01 DIAGNOSIS — Z96.1 PSEUDOPHAKIA, RIGHT EYE: ICD-10-CM

## 2025-05-01 PROBLEM — H25.13 NUCLEAR SCLEROSIS OF BOTH EYES: Status: RESOLVED | Noted: 2024-05-08 | Resolved: 2025-05-01

## 2025-05-01 PROBLEM — H25.11 NUCLEAR SCLEROTIC CATARACT OF RIGHT EYE: Status: RESOLVED | Noted: 2024-08-08 | Resolved: 2025-05-01

## 2025-05-01 PROCEDURE — 99999 PR PBB SHADOW E&M-EST. PATIENT-LVL III: CPT | Mod: PBBFAC,,, | Performed by: OPTOMETRIST

## 2025-05-01 PROCEDURE — 92250 FUNDUS PHOTOGRAPHY W/I&R: CPT | Mod: PBBFAC,PO | Performed by: OPTOMETRIST

## 2025-05-01 PROCEDURE — 99213 OFFICE O/P EST LOW 20 MIN: CPT | Mod: PBBFAC,PO | Performed by: OPTOMETRIST

## 2025-05-01 NOTE — PATIENT INSTRUCTIONS
"DRY EYES -- BURNING OR TERESA SYMPTOMS:  Use Over The Counter artificial tears as needed for dry eye symptoms.   Some common brands include:  Systane, Optive, Refresh, and Thera-Tears.  These drops can be used as frequently as desired, but may be most helpful use during long periods of concentrated work.  For example, reading / working at the computer. Start with 3-4x per day.     Nighttime Ophthalmic gel or ointments are available: Refresh PM, Genteal, and Lacrilube.    Avoid drops that "get redness out" (Visine, Murine, Clear Eyes), as these may contain medication that could further irritate the eyes, especially with chronic use.    ALLERGY EYES -- ITCHING SYMPTOMS:  Over the counter medications include--Pataday, Zaditor, and Alaway.  Use as directed 1-2 drops daily for symptoms of itching / watering eyes.  These drops will not help for dry eye or exposure symptoms.    REDNESS RELIEF:  Lumify---is a good redness reliever that will not cause irritation if used chronically.        FLASHES / FLOATERS / POSTERIOR VITREOUS DETACHMENT    Call the clinic if you have any further changes in symptoms.  Including:  Increased numbers of floaters or flashing lights, dimness or darkness that moves through or stays constant in your vision, or any pain in the eye (s).    You may sometimes see small specks or clouds moving in your field of vision.  They are called FLOATERS.  You can often see them when looking at a plain background, like a blank wall or blue neal.  Floaters are actually tiny clumps of gel or cells inside the VITREOUS, the clear jelly-like fluid that fills the inside of your eye.    While these objects look like they are in front of your eye, they are actually floating inside.  What you see are the shadows they cast on the RETINA, the nerve layer at the back of the eye that senses light and allows you to see.      POSTERIOR VITREOUS DETACHMENT    The appearance of new floaters may be alarming.  If you suddenly " develop new floaters, you should contact your eye care professional  right away.    The retina can tear if the shrinking vitreous pulls away from the wall of the eye.  This sometimes causes a small amount of bleeding in the eye that may appear as new floaters.    A torn retina is always a serious problem, since it can lead to a retinal detachment.  You should see your eye care professional as soon as possible if:    even one new floater appears suddenly;  you see sudden flashes of light;  you notice other symptoms, like the loss of side vision, or a curtain closes down in your vision        POSTERIOR VITREOUS DETACHMENT is more common for people who:    are nearsighted;  have had cataract surgery;  have had YAG laser surgery of the eye;  have had inflammation inside the eye;  are over age 60.      While some floaters may remain visible, many of them will fade over time and become less noticeable.  Even if you've had some floaters for years, you should have your eyes checked as soon as possible if you notice new ones.    FLASHING LIGHTS    When the vitreous gel rubs or pulls on the retina, you may see what look like flashing lights or lightning streaks.  These flashes can appear off and on for several weeks or months.      Some people experience flashes of light that appear as jagged lines or heat waves in both eyes, lasting 10-20 minutes.  These flashes are caused by a spasm of blood vessels in the brain, which is called a migraine.    If a headache follows these flashes, it's called a migraine headache.  If   no headache occurs, these flashes are called Ophthalmic or Ocular Migraine.               Cataract Surgery FAQs  Frequently Asked Questions    My doctor told me I have a cataract     What do I do next?   Relax. Cataracts are a normal part of aging, but can also be associated with diabetes, metabolic conditions, use of certain medications, and injury to the eye. Cataract surgery is among the safest and  most common procedures performed today.  Most patients who have had cataract surgery report significant improvement in their quality of life because of their improved vision, with a speedy recovery and minimal discomfort or inconvenience.        Your optometrist will recommend a cataract surgeon who will examine your eyes, evaluate the need for surgery, and discuss the details with you and your family.     What exactly is a cataract?   A cataract is simply a darkening or clouding of the eyes internal lens, which blurs your vision.  It is not a film which grows over the eye, but rather a degenerative process which causes the eyes normally clear lens to become cloudy or hazy.     Because this degenerative process occurs slowly over many years, we generally wait until the cataract begins to significantly impact your vision and activities of daily living before recommend surgery.                     How is cataract surgery performed?  Cataract surgery involves removal of the dark or cloudy lens from the eye, and implantation of a new, clear lens implant or IOL.  Cataract surgery is a lens replacement surgery.          Modern cataract surgery is performed as a same-day surgery and typically takes about 15 minutes to complete.  It is performed while you are awake, but you will be medicated so that you are relaxed and comfortable. Of course, the eye is anesthetized so that you dont feel any discomfort, and many people only vaguely remember the actual procedure, recalling only lights and the sensation of moisture on the eye.     Although is takes about a week to fully heal, most people are able to see better and resume their normal activities the very next day!  You will need to use eye drops for about one month after your surgery.     Will I need glasses after cataract surgery?   The purpose of cataract surgery is to improve the overall quality of your vision, but it does not necessarily eliminate the need for  glasses. Although some people dont need to wear glasses after standard cataract surgery, most people will still need to wear glasses for certain tasks.  If you are interested in minimizing or even eliminating the need for glasses, you should ask your surgeon about Advanced Technology Cataract Surgery (ATCS).    What is Advanced Technology Cataract Surgery (ATCS)?   Advanced Technology Cataract Surgery refers to the use of additional techniques performed either at the time of your cataract surgery or soon afterwards, designed to minimize and often eliminate your need for glasses.  Technologies such as LASERs, Astigmatism Correcting Incisions, Extended Range of Vision lens implants can all be used, depending on the particular needs of each patient. Only your surgeon can determine if you are a candidate and which techniques are appropriate for your goals and your eye.                         LASIK                Multifocal IOL         Will my insurance cover these additional procedures?   Although your insurance will fully cover your cataract surgery, any other additional procedures -designed solely to eliminate the need for glasses- are considered elective (not medically necessary), and therefore not covered by your insurance.  Rest assured that your vision will be better after cataract surgery, in either case.  You simply need to decide whether minimizing your dependence on glasses is worth the additional investment in your eyes.  (Costs typically range between $1,500 to $2,500 per eye, depending on your needs).    View a 1hr video discussing cataract surgery with live patient questions and answers on the [facility] Web Site (Keywords: [facility])    Costs for Advanced Technology Cataract Surgery    Distance only Vision Correction  $1,400 or $2,000  PER EYE   Correction of astigmatism with LASER or TORIC Lens Implant for distance vision  Requires advanced pre-operative measurements and surgical  planning  Best opportunity for quality distance vision. Most patients are able to drive and see TV without glasses, but require reading glasses for near tasks  Cost based on amount of astigmatism needing correction needed      Distance and Near Vision Correction   $2,550 PER EYE  Correction of astigmatism with LASER or TORIC Lens Implant  Correction of both distance and near vision using Trifocal and Extended Depth of Focus Lens Implant  Requires advanced pre-operative measurements and surgical planning  Best opportunity for BOTH quality distance vision and near vision. Most patients are able to drive and also read without glasses.

## 2025-05-01 NOTE — PROGRESS NOTES
HPI    Pt here for annual eye exam DLS- 09/16/24    Pt complains of decreased vision OS.   Denies F/F. Occasional ocular migraine with aura.   Using OTC allergy GTTS PRN with relief.  Last edited by Martha Bee on 5/1/2025 10:29 AM.            Assessment /Plan     For exam results, see Encounter Report.    Nuclear sclerosis, left  -     Ambulatory referral/consult to Ophthalmology; Future; Expected date: 05/08/2025    Posterior vitreous detachment, bilateral  -     Cancel: Color Fundus Photography - OU - Both Eyes; Future    Chorioretinal scar, bilateral  -     Cancel: Color Fundus Photography - OU - Both Eyes; Future  -     Color Fundus Photography - OU - Both Eyes    Glaucoma screening    Pseudophakia, right eye      Vis sig NS cataracts, OS. Ready for consult, gave info, cautions driving especially at night.   Stable OU, RD precautions given and reviewed. Patient knows to call/ message if any further changes in symptoms occur.  OPTOS 5/2025   New baseline  Hist of barrier laser for holes OU  OD sup nasal  OS sup temp     4.   Not suspect   5.   Stable OD --Claudy 2024     To Dr Favre for cataract consult OS                    
None

## 2025-05-07 ENCOUNTER — TELEPHONE (OUTPATIENT)
Dept: OPHTHALMOLOGY | Facility: CLINIC | Age: 70
End: 2025-05-07
Payer: MEDICARE

## 2025-05-08 ENCOUNTER — TELEPHONE (OUTPATIENT)
Dept: OPHTHALMOLOGY | Facility: CLINIC | Age: 70
End: 2025-05-08
Payer: MEDICARE

## 2025-05-12 LAB
OHS CV AF BURDEN PERCENT: 1.8
OHS CV DC REMOTE DEVICE TYPE: NORMAL
OHS CV ICD SHOCK: NO

## 2025-05-12 NOTE — PROGRESS NOTES
Subjective:       Patient ID: Tamica Cardona is a 69 y.o. female.    Chief Complaint: Follow-up    HPI here for follow up.  Due for labs.     ATRIAL FIBRILLATION:  She reports improvement in AFib symptoms with decreased frequency of episodes. She experiences occasional tachycardia, primarily at night. She takes Flecainide as needed during episodes and Eliquis daily for management. Followed by Cardiology. Most recent not reviewed.      SLEEP APNEA:  She continues CPAP usage despite disliking the device, noting symptomatic improvement with compliance.    CHRONIC ALLERGIES: states allergies have been more flared up lately. Using flonase, taking Allegra. No indication of infection    ANXIETY/DEPRESSION: doing well on current dose of Lexapro. Klonopin prn for episodes of intense anxiety.  She is on Propranolol that helps with rate control and anxiety.     States she is waling for exercise. She feels that her weight has been going up and this concerns her. Trying to be more active.     Medical history of Allergy, Anxiety, Atrial fibrillation, Sleep apnea with Cpap, Breast cancer, right sided mastectomy, Tremor, Depression, History of UTI, Interstitial cystitis, KEMAL, long term use anticoagulants and Neutropenia.     See ROS    The following portion of the patients history was reviewed and updated as appropriate: allergies, current medications, past medical and surgical history. Past social history and problem list reviewed. Family PMH and Past social history reviewed. Tobacco, Illicit drug use reviewed.      Review of patient's allergies indicates:   Allergen Reactions    Thimerosal Swelling    Erythromycin base      Other reaction(s): Vomiting  Other reaction(s): Stomach upset  Other reaction(s): Nausea    Penicillins      Other reaction(s): convulsions       Current Medications[1]    Past Medical History:   Diagnosis Date    Allergy     Anxiety     Atrial fibrillation 10/12/2020    Breast cancer 11/2003    right     Depression     General anesthetics causing adverse effect in therapeutic use     slow to wake-- easily sedated    History of UTI     Interstitial cystitis 2003    Neutropenia     Sleep apnea     Streptococcal pharyngitis 01/05/2024       Past Surgical History:   Procedure Laterality Date    BREAST BIOPSY Left 2006    benign    CATARACT EXTRACTION W/  INTRAOCULAR LENS IMPLANT Right 08/08/2024    Procedure: EXTRACTION, CATARACT, WITH IOL INSERTION;  Surgeon: Nik Loco MD;  Location: [facility] OR;  Service: Ophthalmology;  Laterality: Right;    COLONOSCOPY, SCREENING, LOW RISK PATIENT N/A 02/20/2025    Procedure: COLONOSCOPY, SCREENING, LOW RISK PATIENT;  Surgeon: Winston Camp MD;  Location: [facility] ENDO;  Service: Endoscopy;  Laterality: N/A;  will need to be off eliquis before procedure, she will need cardiology approval    CYSTOSCOPY  2003    EYE SURGERY  01/2015    HYSTERECTOMY      TVH, ovaries intact. Uterine prolapse    INSERTION OF IMPLANTABLE LOOP RECORDER Left 02/08/2021    Procedure: Insertion, Implantable Loop Recorder;  Surgeon: Collin Roberts MD;  Location: [facility] CATH;  Service: Cardiology;  Laterality: Left;    KNEE ARTHROSCOPY W/ MENISCECTOMY  1992    LT    left breast biopsy  2006    benign    MASTECTOMY, RADICAL Right 12/2003    RETINAL DETACHMENT SURGERY Right        Social History[2]    Review of Systems   Constitutional:  Negative for fatigue and fever.   HENT:  Positive for postnasal drip, rhinorrhea and sneezing.    Eyes:  Negative for visual disturbance.   Respiratory:  Negative for cough, chest tightness, shortness of breath and wheezing.    Cardiovascular:  Negative for chest pain, palpitations and leg swelling.   Gastrointestinal:  Negative for abdominal pain, blood in stool, diarrhea, nausea and vomiting.   Genitourinary: Negative.    Musculoskeletal:  Negative for arthralgias, back pain and gait problem.   Skin: Negative.    Neurological:  Negative for headaches.  "  Psychiatric/Behavioral:  Negative for dysphoric mood and sleep disturbance. The patient is not nervous/anxious.        Objective:      /62 (BP Location: Left arm, Patient Position: Sitting)   Pulse 61   Temp 98.1 °F (36.7 °C) (Temporal)   Resp 18   Ht 5' 5" (1.651 m)   Wt 73.2 kg (161 lb 6 oz)   SpO2 95%   BMI 26.85 kg/m²      Physical Exam  Constitutional:       Appearance: Normal appearance. She is normal weight.   HENT:      Head: Normocephalic.   Eyes:      Pupils: Pupils are equal, round, and reactive to light.   Neck:      Thyroid: No thyromegaly.      Vascular: No carotid bruit.   Cardiovascular:      Rate and Rhythm: Normal rate and regular rhythm.      Pulses: Normal pulses.      Heart sounds: Normal heart sounds. No murmur heard.  Pulmonary:      Effort: Pulmonary effort is normal.      Breath sounds: Normal breath sounds. No wheezing.   Abdominal:      General: Bowel sounds are normal.      Tenderness: There is no abdominal tenderness.   Musculoskeletal:         General: Normal range of motion.      Cervical back: Normal range of motion.      Right lower leg: No edema.      Left lower leg: No edema.      Comments: Gait normal.  strong, equal   Skin:     General: Skin is warm and dry.      Capillary Refill: Capillary refill takes less than 2 seconds.   Neurological:      General: No focal deficit present.      Mental Status: She is alert.   Psychiatric:         Attention and Perception: Attention and perception normal.         Mood and Affect: Mood and affect normal.         Speech: Speech normal.         Behavior: Behavior normal.         Assessment:       1. Mixed hyperlipidemia    2. Paroxysmal atrial fibrillation    3. Encounter for long-term (current) use of medications    4. Benign familial tremor    5. Obstructive sleep apnea syndrome    6. Neutropenia, unspecified type    7. History of breast cancer    8. Depression, unspecified depression type    9. Anxiety disorder, " unspecified type    10. Long term (current) use of anticoagulants        Plan:       Mixed hyperlipidemia: tolerating low dose statin. Good results with LDL at 77.     Paroxysmal atrial fibrillation: stable. On Eliquis. Flecainide as needed.     Encounter for long-term (current) use of medications  -     Comprehensive Metabolic Panel  -     Hemoglobin A1C  -     Lipid Panel  -     TSH  -     CBC Without Differential    Benign familial tremor: stable. Propranolol helps    Obstructive sleep apnea syndrome: compliant with Cpap    Neutropenia, unspecified type: stable.     History of breast cancer: s/p Mastectomy    Depression, unspecified depression type: stable on current dose of Lexapro    Anxiety disorder, unspecified type: stable on current medications    Long term (current) use of anticoagulants: she is aware of precautions related to use of anticoagulants.     I spent a total of 33 minutes on the day of the visit.     This includes face to face time with the patient, as well as non-face to face time preparing for and completing the visit (review of prior diagnostic testing and clinical notes, obtaining or reviewing history, documenting clinical information in the EMR, independently interpreting and communicating results to the patient/family and coordinating ongoing care).       Continue current medication  Take medications only as prescribed  Healthy diet, exercise  Adequate rest  Adequate hydration  Avoid allergens  Avoid excessive caffeine     Follow up 6 months.          [1]   Current Outpatient Medications:     acetaminophen (TYLENOL) 500 MG tablet, Take 750 mg by mouth every 6 (six) hours as needed for Pain., Disp: , Rfl:     apixaban (ELIQUIS) 5 mg Tab, Take 1 tablet (5 mg total) by mouth 2 (two) times daily., Disp: 180 tablet, Rfl: 1    calcium carbonate (OS-DESTINY) 600 mg (1,500 mg) Tab, Take 600 mg by mouth 2 (two) times daily with meals., Disp: , Rfl:     clonazePAM (KLONOPIN) 0.5 MG tablet, Take 1/2 to 1  tablet daily as needed for anxiety, Disp: 30 tablet, Rfl: 0    EScitalopram oxalate (LEXAPRO) 10 MG tablet, TAKE ONE + ONE-HALF TABLETS BY MOUTH EVERY DAY, Disp: 135 tablet, Rfl: 3    ferrous gluconate (FERGON) 325 MG tablet, Take 45 mg by mouth daily with breakfast., Disp: , Rfl:     fexofenadine (ALLEGRA ALLERGY) 180 MG tablet, 180 mg., Disp: , Rfl:     flecainide (TAMBOCOR) 100 MG Tab, Take 1 tablet (100 mg total) by mouth every 12 (twelve) hours as needed., Disp: 60 tablet, Rfl: 11    fluticasone propionate (FLONASE) 50 mcg/actuation nasal spray, , Disp: , Rfl:     loratadine (CLARITIN) 10 mg tablet, Take 10 mg by mouth daily as needed. , Disp: , Rfl:     meclizine (ANTIVERT) 25 mg tablet, Take 1 tablet (25 mg total) by mouth 3 (three) times daily as needed., Disp: 30 tablet, Rfl: 1    prednisolon/gatiflox/bromfenac (PREDNISOL ACE-GATIFLOX-BROMFEN) 1-0.5-0.075 % DrpS, Apply 1 drop to eye 3 (three) times daily. in operative eye for 1 month after surgery, Disp: 5 mL, Rfl: 3    propranoloL (INDERAL) 20 MG tablet, TAKE 1 TABLET BY MOUTH THREE TIMES A DAY, Disp: 270 tablet, Rfl: 1    rosuvastatin (CRESTOR) 10 MG tablet, Take 1 tablet (10 mg total) by mouth once daily., Disp: 90 tablet, Rfl: 1    vitamin D (VITAMIN D3) 1000 units Tab, Take 1,000 Units by mouth once daily., Disp: , Rfl:     Current Facility-Administered Medications:     sodium chloride 0.9% flush 10 mL, 10 mL, Intravenous, PRN, Nik Loco MD  [2]   Social History  Socioeconomic History    Marital status:    Tobacco Use    Smoking status: Never    Smokeless tobacco: Never   Substance and Sexual Activity    Alcohol use: No    Drug use: No     Social Drivers of Health     Financial Resource Strain: Low Risk  (2/3/2024)    Overall Financial Resource Strain (CARDIA)     Difficulty of Paying Living Expenses: Not hard at all   Food Insecurity: No Food Insecurity (2/3/2024)    Hunger Vital Sign     Worried About Running Out of Food in the Last  Year: Never true     Ran Out of Food in the Last Year: Never true   Transportation Needs: No Transportation Needs (2/3/2024)    PRAPARE - Transportation     Lack of Transportation (Medical): No     Lack of Transportation (Non-Medical): No   Physical Activity: Insufficiently Active (2/3/2024)    Exercise Vital Sign     Days of Exercise per Week: 3 days     Minutes of Exercise per Session: 40 min   Stress: No Stress Concern Present (2/3/2024)    [employer] - Occupational Stress Questionnaire     Feeling of Stress : Only a little   Housing Stability: Low Risk  (2/3/2024)    Housing Stability Vital Sign     Unable to Pay for Housing in the Last Year: No     Number of Places Lived in the Last Year: 1     Unstable Housing in the Last Year: No

## 2025-05-13 ENCOUNTER — OFFICE VISIT (OUTPATIENT)
Dept: FAMILY MEDICINE | Facility: CLINIC | Age: 70
End: 2025-05-13
Payer: MEDICARE

## 2025-05-13 VITALS
SYSTOLIC BLOOD PRESSURE: 100 MMHG | HEIGHT: 65 IN | OXYGEN SATURATION: 95 % | BODY MASS INDEX: 26.89 KG/M2 | WEIGHT: 161.38 LBS | HEART RATE: 61 BPM | TEMPERATURE: 98 F | RESPIRATION RATE: 18 BRPM | DIASTOLIC BLOOD PRESSURE: 62 MMHG

## 2025-05-13 DIAGNOSIS — E78.2 MIXED HYPERLIPIDEMIA: Primary | ICD-10-CM

## 2025-05-13 DIAGNOSIS — G47.33 OBSTRUCTIVE SLEEP APNEA SYNDROME: ICD-10-CM

## 2025-05-13 DIAGNOSIS — F32.A DEPRESSION, UNSPECIFIED DEPRESSION TYPE: ICD-10-CM

## 2025-05-13 DIAGNOSIS — I48.0 PAROXYSMAL ATRIAL FIBRILLATION: ICD-10-CM

## 2025-05-13 DIAGNOSIS — G25.0 BENIGN FAMILIAL TREMOR: ICD-10-CM

## 2025-05-13 DIAGNOSIS — Z79.899 ENCOUNTER FOR LONG-TERM (CURRENT) USE OF MEDICATIONS: ICD-10-CM

## 2025-05-13 DIAGNOSIS — F41.9 ANXIETY DISORDER, UNSPECIFIED TYPE: ICD-10-CM

## 2025-05-13 DIAGNOSIS — Z79.01 LONG TERM (CURRENT) USE OF ANTICOAGULANTS: ICD-10-CM

## 2025-05-13 DIAGNOSIS — Z85.3 HISTORY OF BREAST CANCER: ICD-10-CM

## 2025-05-13 DIAGNOSIS — D70.9 NEUTROPENIA, UNSPECIFIED TYPE: ICD-10-CM

## 2025-05-13 LAB
ALBUMIN SERPL BCP-MCNC: 3.7 G/DL (ref 3.5–5.2)
ALP SERPL-CCNC: 84 UNIT/L (ref 40–150)
ALT SERPL W/O P-5'-P-CCNC: 20 UNIT/L (ref 10–44)
ANION GAP (OHS): 11 MMOL/L (ref 8–16)
AST SERPL-CCNC: 22 UNIT/L (ref 11–45)
BILIRUB SERPL-MCNC: 1.4 MG/DL (ref 0.1–1)
BUN SERPL-MCNC: 9 MG/DL (ref 8–23)
CALCIUM SERPL-MCNC: 9.6 MG/DL (ref 8.7–10.5)
CHLORIDE SERPL-SCNC: 106 MMOL/L (ref 95–110)
CHOLEST SERPL-MCNC: 155 MG/DL (ref 120–199)
CHOLEST/HDLC SERPL: 2.7 {RATIO} (ref 2–5)
CO2 SERPL-SCNC: 26 MMOL/L (ref 23–29)
CREAT SERPL-MCNC: 0.9 MG/DL (ref 0.5–1.4)
EAG (OHS): 105 MG/DL (ref 68–131)
ERYTHROCYTE [DISTWIDTH] IN BLOOD BY AUTOMATED COUNT: 13.5 % (ref 11.5–14.5)
GFR SERPLBLD CREATININE-BSD FMLA CKD-EPI: >60 ML/MIN/1.73/M2
GLUCOSE SERPL-MCNC: 80 MG/DL (ref 70–110)
HBA1C MFR BLD: 5.3 % (ref 4–5.6)
HCT VFR BLD AUTO: 44.1 % (ref 37–48.5)
HDLC SERPL-MCNC: 57 MG/DL (ref 40–75)
HDLC SERPL: 36.8 % (ref 20–50)
HGB BLD-MCNC: 14 GM/DL (ref 12–16)
LDLC SERPL CALC-MCNC: 77.8 MG/DL (ref 63–159)
MCH RBC QN AUTO: 30 PG (ref 27–31)
MCHC RBC AUTO-ENTMCNC: 31.7 G/DL (ref 32–36)
MCV RBC AUTO: 94 FL (ref 82–98)
NONHDLC SERPL-MCNC: 98 MG/DL
PLATELET # BLD AUTO: 176 K/UL (ref 150–450)
PMV BLD AUTO: 11.7 FL (ref 9.2–12.9)
POTASSIUM SERPL-SCNC: 5.2 MMOL/L (ref 3.5–5.1)
PROT SERPL-MCNC: 6.9 GM/DL (ref 6–8.4)
RBC # BLD AUTO: 4.67 M/UL (ref 4–5.4)
SODIUM SERPL-SCNC: 143 MMOL/L (ref 136–145)
TRIGL SERPL-MCNC: 101 MG/DL (ref 30–150)
TSH SERPL-ACNC: 2.98 UIU/ML (ref 0.4–4)
WBC # BLD AUTO: 4.08 K/UL (ref 3.9–12.7)

## 2025-05-13 PROCEDURE — 84443 ASSAY THYROID STIM HORMONE: CPT | Performed by: NURSE PRACTITIONER

## 2025-05-13 PROCEDURE — 80053 COMPREHEN METABOLIC PANEL: CPT | Performed by: NURSE PRACTITIONER

## 2025-05-13 PROCEDURE — 80061 LIPID PANEL: CPT | Performed by: NURSE PRACTITIONER

## 2025-05-13 PROCEDURE — 99214 OFFICE O/P EST MOD 30 MIN: CPT | Mod: S$GLB,,, | Performed by: NURSE PRACTITIONER

## 2025-05-13 PROCEDURE — 83036 HEMOGLOBIN GLYCOSYLATED A1C: CPT | Performed by: NURSE PRACTITIONER

## 2025-05-13 PROCEDURE — 85027 COMPLETE CBC AUTOMATED: CPT | Performed by: NURSE PRACTITIONER

## 2025-05-15 ENCOUNTER — CLINICAL SUPPORT (OUTPATIENT)
Dept: CARDIOLOGY | Facility: HOSPITAL | Age: 70
End: 2025-05-15
Payer: MEDICARE

## 2025-05-15 ENCOUNTER — HOSPITAL ENCOUNTER (OUTPATIENT)
Dept: CARDIOLOGY | Facility: HOSPITAL | Age: 70
Discharge: HOME | End: 2025-05-15
Attending: INTERNAL MEDICINE
Payer: MEDICARE

## 2025-05-15 DIAGNOSIS — I49.8 OTHER SPECIFIED CARDIAC ARRHYTHMIAS: ICD-10-CM

## 2025-05-15 PROCEDURE — 93298 REM INTERROG DEV EVAL SCRMS: CPT | Mod: PO | Performed by: INTERNAL MEDICINE

## 2025-05-15 PROCEDURE — 93298 REM INTERROG DEV EVAL SCRMS: CPT | Mod: 26,,, | Performed by: INTERNAL MEDICINE

## 2025-05-18 ENCOUNTER — PATIENT MESSAGE (OUTPATIENT)
Dept: FAMILY MEDICINE | Facility: CLINIC | Age: 70
End: 2025-05-18
Payer: MEDICARE

## 2025-05-19 ENCOUNTER — PATIENT MESSAGE (OUTPATIENT)
Dept: FAMILY MEDICINE | Facility: CLINIC | Age: 70
End: 2025-05-19
Payer: MEDICARE

## 2025-05-19 LAB
OHS CV AF BURDEN PERCENT: 1.6
OHS CV DC REMOTE DEVICE TYPE: NORMAL
OHS CV ICD SHOCK: NO

## 2025-05-22 ENCOUNTER — PATIENT MESSAGE (OUTPATIENT)
Dept: OBSTETRICS AND GYNECOLOGY | Facility: CLINIC | Age: 70
End: 2025-05-22
Payer: MEDICARE

## 2025-05-22 NOTE — TELEPHONE ENCOUNTER
----- Message from Tamra Campos sent at 5/30/2022 10:21 AM CDT -----  Type: Needs Medical Advice  Who Called:  pt  Best Call Back Number: [phone] (home)     Additional Information:pt would like to come in and get tested for COVID--please advise--thank you         DISPLAY PLAN FREE TEXT DISPLAY PLAN FREE TEXT DISPLAY PLAN FREE TEXT DISPLAY PLAN FREE TEXT DISPLAY PLAN FREE TEXT DISPLAY PLAN FREE TEXT DISPLAY PLAN FREE TEXT DISPLAY PLAN FREE TEXT DISPLAY PLAN FREE TEXT

## 2025-05-30 DIAGNOSIS — I48.0 PAROXYSMAL ATRIAL FIBRILLATION: ICD-10-CM

## 2025-06-02 RX ORDER — APIXABAN 5 MG/1
5 TABLET, FILM COATED ORAL 2 TIMES DAILY
Qty: 180 TABLET | Refills: 3 | Status: SHIPPED | OUTPATIENT
Start: 2025-06-02

## 2025-06-13 ENCOUNTER — PATIENT MESSAGE (OUTPATIENT)
Dept: CARDIOLOGY | Facility: CLINIC | Age: 70
End: 2025-06-13
Payer: MEDICARE

## 2025-06-15 ENCOUNTER — CLINICAL SUPPORT (OUTPATIENT)
Dept: CARDIOLOGY | Facility: HOSPITAL | Age: 70
End: 2025-06-15
Payer: MEDICARE

## 2025-06-15 ENCOUNTER — HOSPITAL ENCOUNTER (OUTPATIENT)
Dept: CARDIOLOGY | Facility: HOSPITAL | Age: 70
Discharge: HOME | End: 2025-06-15
Attending: INTERNAL MEDICINE
Payer: MEDICARE

## 2025-06-15 DIAGNOSIS — I49.8 OTHER SPECIFIED CARDIAC ARRHYTHMIAS: ICD-10-CM

## 2025-06-15 PROCEDURE — 93298 REM INTERROG DEV EVAL SCRMS: CPT | Mod: 26,,, | Performed by: INTERNAL MEDICINE

## 2025-06-15 PROCEDURE — 93298 REM INTERROG DEV EVAL SCRMS: CPT | Mod: PO | Performed by: INTERNAL MEDICINE

## 2025-06-16 ENCOUNTER — PATIENT MESSAGE (OUTPATIENT)
Dept: FAMILY MEDICINE | Facility: CLINIC | Age: 70
End: 2025-06-16
Payer: MEDICARE

## 2025-06-25 ENCOUNTER — TELEPHONE (OUTPATIENT)
Dept: CARDIOLOGY | Facility: HOSPITAL | Age: 70
End: 2025-06-25
Payer: MEDICARE

## 2025-06-27 ENCOUNTER — PATIENT MESSAGE (OUTPATIENT)
Dept: CARDIOLOGY | Facility: HOSPITAL | Age: 70
End: 2025-06-27
Payer: MEDICARE

## 2025-06-27 ENCOUNTER — TELEPHONE (OUTPATIENT)
Dept: CARDIOLOGY | Facility: HOSPITAL | Age: 70
End: 2025-06-27
Payer: MEDICARE

## 2025-06-27 DIAGNOSIS — I48.0 PAROXYSMAL ATRIAL FIBRILLATION: ICD-10-CM

## 2025-06-30 RX ORDER — PROPRANOLOL HYDROCHLORIDE 20 MG/1
20 TABLET ORAL 3 TIMES DAILY
Qty: 270 TABLET | Refills: 1 | Status: CANCELLED | OUTPATIENT
Start: 2025-06-30

## 2025-06-30 NOTE — TELEPHONE ENCOUNTER
AF w/ RVR  noted during device interrogation/device remote check:    Overall burden:  1.7% (5/15-6/15)  Current- 6/22/25-6/25/25    Max duration seen: 3 hrs. 10 mins. On 6/21 (mean V rate 122 bpm)              Most recent episode: 6/23    Ventricular rates:  Needs improvement during AF    V rates not during AF:              Anticoagulation status:  Eliquis    Meds:  Propanolol 20 mg bid  Flecainide 100 mg every 12 hrs. As needed  Eliquis 5 mg bid      Patient symptoms: per MyChart message:  I am on the same medication and dosage, except Tim been on propranolol 20 mg, only twice a day. I think soy been on this dosage for over 6 months because I was feeling so tired everyday. Does he recommend that I go back to 3 times a day? Most of the time Afib episodes come without provocation. And I can feel it because my heart races fast. I then take a flecainide and relax until it subsides.       Message sent to Dr. Roberts for review

## 2025-07-01 ENCOUNTER — PATIENT MESSAGE (OUTPATIENT)
Dept: FAMILY MEDICINE | Facility: CLINIC | Age: 70
End: 2025-07-01
Payer: MEDICARE

## 2025-07-01 ENCOUNTER — E-VISIT (OUTPATIENT)
Dept: URGENT CARE | Facility: CLINIC | Age: 70
End: 2025-07-01
Payer: MEDICARE

## 2025-07-01 DIAGNOSIS — N30.00 ACUTE CYSTITIS WITHOUT HEMATURIA: Primary | ICD-10-CM

## 2025-07-01 DIAGNOSIS — R39.9 UTI SYMPTOMS: Primary | ICD-10-CM

## 2025-07-01 RX ORDER — NITROFURANTOIN 25; 75 MG/1; MG/1
100 CAPSULE ORAL 2 TIMES DAILY
Qty: 10 CAPSULE | Refills: 0 | Status: SHIPPED | OUTPATIENT
Start: 2025-07-01 | End: 2025-07-06

## 2025-07-01 NOTE — PROGRESS NOTES
Patient ID: Tamica Cardona is a 69 y.o. female.        E-Visit Time Tracking:   Day 1 Time (in minutes): 5  Total Time (in minutes): 5      Chief Complaint: Urinary Tract Infection (Entered automatically based on patient selection in [employer].)      The patient initiated a request through [employer] on 7/1/2025 for evaluation and management with a chief complaint of Urinary Tract Infection (Entered automatically based on patient selection in [employer].)     I evaluated the questionnaire submission on 07/01/2025.    [facility] Peq Evisit Uti Questionnaire    7/1/2025 10:39 AM CDT - Filed by Patient   Do you agree to participate in an E-Visit? Yes   If you have any of the following symptoms, please go to the nearest emergency room or call 911: I acknowledge   Choose the state of your primary residence [place]   What is the main issue you would like addressed today? Urinary tract infection symptoms   Do you have any of the following symptoms? ( Discomfort or pain passing urine, Passing urine more frequently, Cloudy urine, Discharge in urine, Other, None) Discomfort or pain passing urine;  Passing urine more frequently   When did your concern begin? 6/29/2025   What medications or treatments have you used to help your symptoms? (Antibiotics, Cranberry products, Drinking more fluids, Painkillers, Other, None) Other   What other medications or treatments have you used for your symptoms? OTC urinary pain relief   What does your urine look like? (Clear, Cloudy, Dark yellow, Light yellow, Pink or red (bloody), Foamy, Not sure) Light yellow   Have you had any of the following symptoms during the past 24 hours?   Urgency (a sudden and uncontrollable urge to urinate) (None, Mild, Moderate, Severe) Severe   Frequency (going to the toilet very often) (None, Mild, Moderate, Severe) Severe   Burning pain when urinating (None, Mild, Moderate, Severe) Moderate   Incomplete bladder emptying (still feel like you need to urinate again after  urination) (None, Mild, Moderate, Severe) None   Pain in the lower belly when youre not urinating. (None, Mild, Moderate, Severe) None   Please rate how much discomfort these symptoms have caused in the last 24 hours. (None, Mild, Moderate, Severe) Moderate   Have you had any of the following symptoms during the past 24 hours?   Blood seen in the urine (None, Mild, Moderate, Severe) None   Pain in the lower back on one or both sides (flank pain) (None, Mild, Moderate, Severe) None   Abnormal Vaginal Discharge (abnormal amount, color and/or odor) (None, Mild, Moderate, Severe) None   Discharge from the opening you urinate from (urethra) when not urinating. (None, Mild, Moderate, Severe) None   Tell us how the symptoms have interfered with your every day activities/work in the past 24 hours. (None, Mild, Moderate, Severe) Moderate   Do you have a fever? (Less than 100.4°F, 100.4°F or greater, No, Not sure) No   Are you a diabetic? No   If you are female, please tell us if you have any of the following right now (as youre completing the questionnaire): (Menstrual period (menses), PMS (Premenstrual syndrome),Signs of menopause such as hot flashes, Pregnancy, None) None   Do you have a history of kidney stones? No   Provide any information you feel is important to your history not asked above N/A   Please attach any relevant images or files (if you have performed a home test for UTI, please submit a photo of results)    Are you able to take your vitals? Yes   Systolic Blood Pressure 135   Diastolic Blood Pressure 86   Weight: 161   Height: 65   Pluse: 62   Temp    Resp:    SpO2          Encounter Diagnosis   Name Primary?    Acute cystitis without hematuria Yes        No orders of the defined types were placed in this encounter.     Medications Ordered This Encounter   Medications    nitrofurantoin, macrocrystal-monohydrate, (MACROBID) 100 MG capsule     Sig: Take 1 capsule (100 mg total) by mouth 2 (two) times daily.  for 5 days     Dispense:  10 capsule     Refill:  0      PLEASE READ YOUR DISCHARGE INSTRUCTIONS ENTIRELY AS IT CONTAINS IMPORTANT INFORMATION.        Take the antibiotics to completion.      Drink plenty of fluids, wipe front to back, take showers not baths, no scented soaps, wear breathable cotton underwear, urinate after sexual intercourse.      Please go to the ER for worsening symptoms including fever, worsening flank pain, vomiting, etc.         Please return or see your primary care doctor if you develop new or worsening symptoms.     Follow up in about 2 days (around 7/3/2025), or if symptoms worsen or fail to improve.

## 2025-07-03 LAB
OHS CV AF BURDEN PERCENT: 1.7
OHS CV DC REMOTE DEVICE TYPE: NORMAL
OHS CV ICD SHOCK: NO

## 2025-07-16 ENCOUNTER — CLINICAL SUPPORT (OUTPATIENT)
Dept: CARDIOLOGY | Facility: HOSPITAL | Age: 70
End: 2025-07-16
Payer: MEDICARE

## 2025-07-16 ENCOUNTER — HOSPITAL ENCOUNTER (OUTPATIENT)
Dept: CARDIOLOGY | Facility: HOSPITAL | Age: 70
Discharge: HOME | End: 2025-07-16
Attending: INTERNAL MEDICINE
Payer: MEDICARE

## 2025-07-16 ENCOUNTER — OFFICE VISIT (OUTPATIENT)
Dept: OPHTHALMOLOGY | Facility: CLINIC | Age: 70
End: 2025-07-16
Payer: MEDICARE

## 2025-07-16 DIAGNOSIS — H04.123 DRY EYE SYNDROME OF BOTH EYES: ICD-10-CM

## 2025-07-16 DIAGNOSIS — I49.8 OTHER SPECIFIED CARDIAC ARRHYTHMIAS: ICD-10-CM

## 2025-07-16 DIAGNOSIS — H33.313 RETINAL TEAR OF BOTH EYES: ICD-10-CM

## 2025-07-16 DIAGNOSIS — Z96.1 PSEUDOPHAKIA, RIGHT EYE: ICD-10-CM

## 2025-07-16 DIAGNOSIS — H25.12 NUCLEAR SCLEROSIS, LEFT: Primary | ICD-10-CM

## 2025-07-16 PROCEDURE — 99213 OFFICE O/P EST LOW 20 MIN: CPT | Mod: PBBFAC,PO,25 | Performed by: STUDENT IN AN ORGANIZED HEALTH CARE EDUCATION/TRAINING PROGRAM

## 2025-07-16 PROCEDURE — 93298 REM INTERROG DEV EVAL SCRMS: CPT | Mod: 26,,, | Performed by: INTERNAL MEDICINE

## 2025-07-16 PROCEDURE — 92134 CPTRZ OPH DX IMG PST SGM RTA: CPT | Mod: PBBFAC,PO | Performed by: STUDENT IN AN ORGANIZED HEALTH CARE EDUCATION/TRAINING PROGRAM

## 2025-07-16 PROCEDURE — 93298 REM INTERROG DEV EVAL SCRMS: CPT | Mod: PO | Performed by: INTERNAL MEDICINE

## 2025-07-16 PROCEDURE — 99999 PR PBB SHADOW E&M-EST. PATIENT-LVL III: CPT | Mod: PBBFAC,,, | Performed by: STUDENT IN AN ORGANIZED HEALTH CARE EDUCATION/TRAINING PROGRAM

## 2025-07-16 NOTE — PROGRESS NOTES
HPI    Pt. Presents for cat eval.    has blurry vision, glare issues at night, halos around lights.   Flashes when having migraine (1 time a year)   Allergy eyedrops for red itchy eyes.   Pt on nahun (bloodthinner)     Last edited by Blake Alexander MA on 7/16/2025  4:15 PM.            Assessment /Plan     For exam results, see Encounter Report.    Nuclear sclerosis, left  -     Ambulatory referral/consult to Ophthalmology    Pseudophakia, right eye    Dry eye syndrome of both eyes    Retinal tear of both eyes      Visually significant nuclear sclerotic cataract OS  - Interfering with activities of daily living.  Pt desires cataract surgery for [facility] rehabilitation.   - R/B/A discussed and pt agrees to proceed with surgery.   - Denies blunt trauma or prior ocular surgery  - No known use of alpha blocker flomax, TCA imipramine, or antipsychotic chlorpromazine  - Good dilation OU  - Denies prior refractive surgery  -had CEIOL OD with Dr. Loco last year, bothered by difference in the 2 eyes; declines Mrx today, would like to proceed with CEIOL OS  - IOL options discussed according to patient's goals and concomitant ocular pathology; and pt content with monofocal  lens.  Match CNA0T0 of fellow eye.   - Target: distance    Patient desires to have surgery on LEFT eye, second eye      Risks, benefits, and alternatives discussed with the patient. As a benefit, I expect cataract surgery to resolve many of the current symptoms. Given the patient's limitation, we discussed alternatives in the management including observation with or without an updated refraction, and cataract surgery and the patient elects to proceed with surgery. We also reviewed the most common and most serious risks of cataract surgery, including infections (~1 in 4000), retinal detachment (~5 in 1000), retina/macular or corneal edema (delayed visual recovery), retained lens fragment (~1 in 200 may require another surgery or vitrectomy), vitreous loss,  damage to the iris, possibility of not being able to implant a lens (aphakia, leading to a second surgery), and residual refractive error (which may may require using glasses, contact lenses, laser or even a lens exchange. This has under 2% chance of being significant unless patient has had refractive surgery). The patient understands that this list is not all-inclusive and that unusual complications may arise after any surgical procedure. If the patient is an appropriate candidate, we discussed the possibility of multifocal and toric lenses. I explained that no lens option can guarantee full spectacle independence, especially for small print or low light conditions, but some offer less spectacle dependence than others. The patient expressed understanding of these risks, benefits and alternatives and desires to proceed with cataract surgery.    2. Doing well. With Mrx, corrects to 20/20. CNA0T0 lens. Plan to match.     3. Artificial tears 2-4x/day    4. H/o laser to tears/holes OU. Stable on DFE today.     IOL calcs for CEIOL OS, monofocal (CNA0T0 to match), plano

## 2025-07-21 LAB
OHS CV AF BURDEN PERCENT: 3.2
OHS CV DC REMOTE DEVICE TYPE: NORMAL
OHS CV ICD SHOCK: NO

## 2025-07-24 ENCOUNTER — E-VISIT (OUTPATIENT)
Dept: URGENT CARE | Facility: CLINIC | Age: 70
End: 2025-07-24
Payer: MEDICARE

## 2025-07-24 ENCOUNTER — PATIENT MESSAGE (OUTPATIENT)
Dept: FAMILY MEDICINE | Facility: CLINIC | Age: 70
End: 2025-07-24
Payer: MEDICARE

## 2025-07-24 DIAGNOSIS — R39.9 UTI SYMPTOMS: Primary | ICD-10-CM

## 2025-07-24 DIAGNOSIS — N30.80 ACUTE BACTERIAL SIMPLE CYSTITIS: Primary | ICD-10-CM

## 2025-07-24 DIAGNOSIS — B96.89 ACUTE BACTERIAL SIMPLE CYSTITIS: Primary | ICD-10-CM

## 2025-07-24 RX ORDER — PHENAZOPYRIDINE HYDROCHLORIDE 200 MG/1
200 TABLET, FILM COATED ORAL 3 TIMES DAILY PRN
Qty: 15 TABLET | Refills: 0 | Status: SHIPPED | OUTPATIENT
Start: 2025-07-24 | End: 2025-07-29

## 2025-07-24 RX ORDER — NITROFURANTOIN 25; 75 MG/1; MG/1
100 CAPSULE ORAL 2 TIMES DAILY
Qty: 10 CAPSULE | Refills: 0 | Status: SHIPPED | OUTPATIENT
Start: 2025-07-24 | End: 2025-07-29

## 2025-07-24 NOTE — PROGRESS NOTES
Patient ID: Tamica Cardona is a 69 y.o. female.    Chief Complaint: Urinary Tract Infection (Entered automatically based on patient selection in [employer].)          274}  The patient initiated a request through [employer] on 7/24/2025 for evaluation and management with a chief complaint of Urinary Tract Infection (Entered automatically based on patient selection in [employer].)     I evaluated the questionnaire submission on 07/24/2025 .    Total Time (in minutes): 12     Ohs Peq Evisit Uti Questionnaire    7/24/2025 10:03 AM CDT - Filed by Patient   Do you agree to participate in an E-Visit? Yes   If you have any of the following symptoms, please go to the nearest emergency room or call 911: I acknowledge   Choose the state of your primary residence [place]   What is the main issue you would like addressed today? UTI symptoms   Do you have any of the following symptoms? ( Discomfort or pain passing urine, Passing urine more frequently, Cloudy urine, Discharge in urine, Other, None) Discomfort or pain passing urine;  Passing urine more frequently   When did your concern begin? 7/24/2025   What medications or treatments have you used to help your symptoms? (Antibiotics, Cranberry products, Drinking more fluids, Painkillers, Other, None) Other   What other medications or treatments have you used for your symptoms? OTC urinary pain relief   What does your urine look like? (Clear, Cloudy, Dark yellow, Light yellow, Pink or red (bloody), Foamy, Not sure) Light yellow   Have you had any of the following symptoms during the past 24 hours?   Urgency (a sudden and uncontrollable urge to urinate) (None, Mild, Moderate, Severe) Severe   Frequency (going to the toilet very often) (None, Mild, Moderate, Severe) Severe   Burning pain when urinating (None, Mild, Moderate, Severe) Moderate   Incomplete bladder emptying (still feel like you need to urinate again after urination) (None, Mild, Moderate, Severe) Moderate   Pain in the  lower belly when youre not urinating. (None, Mild, Moderate, Severe) None   Please rate how much discomfort these symptoms have caused in the last 24 hours. (None, Mild, Moderate, Severe) Moderate   Have you had any of the following symptoms during the past 24 hours?   Blood seen in the urine (None, Mild, Moderate, Severe) None   Pain in the lower back on one or both sides (flank pain) (None, Mild, Moderate, Severe) None   Abnormal Vaginal Discharge (abnormal amount, color and/or odor) (None, Mild, Moderate, Severe) None   Discharge from the opening you urinate from (urethra) when not urinating. (None, Mild, Moderate, Severe) None   Tell us how the symptoms have interfered with your every day activities/work in the past 24 hours. (None, Mild, Moderate, Severe) Severe   Do you have a fever? (Less than 100.4°F, 100.4°F or greater, No, Not sure) No   Are you a diabetic? No   If you are female, please tell us if you have any of the following right now (as youre completing the questionnaire): (Menstrual period (menses), PMS (Premenstrual syndrome),Signs of menopause such as hot flashes, Pregnancy, None) None   Do you have a history of kidney stones? No   Provide any information you feel is important to your history not asked above I had these same symptoms earlier this month.  I received only a five day antibiotic treatment.  I did finish the five days of capsules.   Please attach any relevant images or files (if you have performed a home test for UTI, please submit a photo of results)    Are you able to take your vitals? Yes   Systolic Blood Pressure 156   Diastolic Blood Pressure 93   Weight: 160   Height: 65   Pluse: 74   Temp 98.6   Resp:    SpO2           Active Problem List with Overview Notes    Diagnosis Date Noted    Decreased ROM of trunk and back 08/11/2023    Benign familial tremor 10/10/2022    Obstructive sleep apnea syndrome 04/23/2022    Tachy-steve syndrome 04/23/2022    Pain in right foot 03/27/2021     Plantar fasciitis 02/22/2021    Heel pain 02/22/2021    Equinus contracture of ankle 02/22/2021    Calcaneal spur, right foot 02/22/2021    Paroxysmal atrial fibrillation 10/14/2020    Anxiety disorder, unspecified 06/16/2020    Neutropenia     Chronic midline low back pain without sciatica 02/01/2019    Plaque psoriasis 10/03/2016     [facility]      Nonintractable episodic headache 10/03/2016    Bilateral hearing loss 10/03/2016     R>L, uses hearing aids      History of breast cancer 11/03/2015    Mixed hyperlipidemia 08/18/2015    Iron deficiency anemia 08/18/2015    Osteopenia 08/18/2015    Retinal tear 02/27/2015    Retinal hole of left eye 02/27/2015    Interstitial cystitis 08/11/2014    Visual field scotoma 04/18/2012    Depression 04/18/2012      Recent Labs Obtained:  Lab Results   Component Value Date    WBC 4.08 05/13/2025    HGB 14.0 05/13/2025    HCT 44.1 05/13/2025    MCV 94 05/13/2025     05/13/2025     05/13/2025    K 5.2 (H) 05/13/2025    GLU 80 05/13/2025    CREATININE 0.9 05/13/2025    EGFRNORACEVR >60 05/13/2025    HGBA1C 5.3 05/13/2025    TSH 2.976 05/13/2025      Review of patient's allergies indicates:   Allergen Reactions    Thimerosal Swelling    Erythromycin base      Other reaction(s): Vomiting  Other reaction(s): Stomach upset  Other reaction(s): Nausea    Penicillins      Other reaction(s): convulsions       Encounter Diagnosis   Name Primary?    Acute bacterial simple cystitis Yes        No orders of the defined types were placed in this encounter.     Medications Ordered This Encounter   Medications    nitrofurantoin, macrocrystal-monohydrate, (MACROBID) 100 MG capsule     Sig: Take 1 capsule (100 mg total) by mouth 2 (two) times daily. for 5 days     Dispense:  10 capsule     Refill:  0    phenazopyridine (PYRIDIUM) 200 MG tablet     Sig: Take 1 tablet (200 mg total) by mouth 3 (three) times daily as needed for Pain.     Dispense:  15 tablet     Refill:  0         E-Visit Time Tracking:    Day 1 Time (in minutes): 12    Total Time (in minutes): 12      274}

## 2025-07-25 ENCOUNTER — CLINICAL SUPPORT (OUTPATIENT)
Dept: OPHTHALMOLOGY | Facility: CLINIC | Age: 70
End: 2025-07-25
Payer: MEDICARE

## 2025-07-25 DIAGNOSIS — H25.12 NUCLEAR SCLEROSIS, LEFT: Primary | ICD-10-CM

## 2025-07-25 PROCEDURE — 92136 OPHTHALMIC BIOMETRY: CPT | Mod: PBBFAC,PO

## 2025-07-25 PROCEDURE — 99999 PR PBB SHADOW E&M-EST. PATIENT-LVL II: CPT | Mod: PBBFAC,,,

## 2025-07-25 PROCEDURE — 99212 OFFICE O/P EST SF 10 MIN: CPT | Mod: PBBFAC,PO

## 2025-07-25 PROCEDURE — 92136 OPHTHALMIC BIOMETRY: CPT | Mod: 26,S$PBB,LT, | Performed by: STUDENT IN AN ORGANIZED HEALTH CARE EDUCATION/TRAINING PROGRAM

## 2025-07-28 RX ORDER — PREDNISOLONE ACETATE 10 MG/ML
1 SUSPENSION/ DROPS OPHTHALMIC 4 TIMES DAILY
Qty: 5 ML | Refills: 2 | Status: SHIPPED | OUTPATIENT
Start: 2025-08-27 | End: 2025-09-26

## 2025-07-28 RX ORDER — PROPARACAINE HYDROCHLORIDE 5 MG/ML
1 SOLUTION/ DROPS OPHTHALMIC
OUTPATIENT
Start: 2025-07-28 | End: 2025-07-28

## 2025-07-28 RX ORDER — TROPICAMIDE 10 MG/ML
1 SOLUTION/ DROPS OPHTHALMIC
OUTPATIENT
Start: 2025-07-28 | End: 2025-07-28

## 2025-07-28 RX ORDER — PHENYLEPHRINE HYDROCHLORIDE 100 MG/ML
1 SOLUTION/ DROPS OPHTHALMIC
OUTPATIENT
Start: 2025-07-28

## 2025-07-28 RX ORDER — KETOROLAC TROMETHAMINE 5 MG/ML
1 SOLUTION OPHTHALMIC DAILY
Qty: 5 ML | Refills: 2 | Status: SHIPPED | OUTPATIENT
Start: 2025-08-27 | End: 2025-09-26

## 2025-07-28 RX ORDER — PHENYLEPHRINE HYDROCHLORIDE 25 MG/ML
1 SOLUTION/ DROPS OPHTHALMIC
OUTPATIENT
Start: 2025-07-28 | End: 2025-07-28

## 2025-07-28 RX ORDER — MOXIFLOXACIN 5 MG/ML
1 SOLUTION/ DROPS OPHTHALMIC 4 TIMES DAILY
Qty: 3 ML | Refills: 0 | Status: SHIPPED | OUTPATIENT
Start: 2025-08-24 | End: 2025-09-03

## 2025-07-28 NOTE — PROGRESS NOTES
Assessment /Plan     For exam results, see Encounter Report.    Nuclear sclerosis, left  -     IOL Master - OU - Both Eyes  -     Place in Outpatient; Standing  -     Case Request Operating Room: PHACOEMULSIFICATION, CATARACT    Other orders  -     moxifloxacin (VIGAMOX) 0.5 % ophthalmic solution; Place 1 drop into the left eye 4 (four) times daily. Start 3 days before cataract surgery and continue for 1 week after surgery. for 10 days  Dispense: 3 mL; Refill: 0  -     prednisoLONE acetate (PRED FORTE) 1 % DrpS; Place 1 drop into the left eye 4 (four) times daily. Start on day of surgery.  Dispense: 5 mL; Refill: 2  -     ketorolac 0.5% (ACULAR) 0.5 % Drop; Place 1 drop into the left eye once daily. Start on the day of surgery.  Dispense: 5 mL; Refill: 2      Proceed with CEIOL left eye

## 2025-07-31 ENCOUNTER — PATIENT MESSAGE (OUTPATIENT)
Dept: CARDIOLOGY | Facility: CLINIC | Age: 70
End: 2025-07-31
Payer: MEDICARE

## 2025-07-31 DIAGNOSIS — E78.2 MIXED HYPERLIPIDEMIA: ICD-10-CM

## 2025-07-31 RX ORDER — ROSUVASTATIN CALCIUM 10 MG/1
10 TABLET, COATED ORAL
Qty: 90 TABLET | Refills: 3 | Status: SHIPPED | OUTPATIENT
Start: 2025-07-31

## 2025-08-08 ENCOUNTER — PATIENT MESSAGE (OUTPATIENT)
Dept: CARDIOLOGY | Facility: CLINIC | Age: 70
End: 2025-08-08
Payer: MEDICARE

## 2025-08-11 ENCOUNTER — HOSPITAL ENCOUNTER (OUTPATIENT)
Dept: CARDIOLOGY | Facility: HOSPITAL | Age: 70
Discharge: HOME | End: 2025-08-11
Attending: INTERNAL MEDICINE
Payer: MEDICARE

## 2025-08-11 ENCOUNTER — CLINICAL SUPPORT (OUTPATIENT)
Dept: CARDIOLOGY | Facility: HOSPITAL | Age: 70
End: 2025-08-11
Payer: MEDICARE

## 2025-08-11 DIAGNOSIS — I49.8 OTHER SPECIFIED CARDIAC ARRHYTHMIAS: ICD-10-CM

## 2025-08-12 ENCOUNTER — TELEPHONE (OUTPATIENT)
Dept: CARDIOLOGY | Facility: HOSPITAL | Age: 70
End: 2025-08-12
Payer: MEDICARE

## 2025-08-15 RX ORDER — ESCITALOPRAM OXALATE 10 MG/1
15 TABLET ORAL DAILY
Qty: 135 TABLET | Refills: 3 | Status: SHIPPED | OUTPATIENT
Start: 2025-08-15

## 2025-08-16 ENCOUNTER — HOSPITAL ENCOUNTER (OUTPATIENT)
Dept: CARDIOLOGY | Facility: HOSPITAL | Age: 70
Discharge: HOME | End: 2025-08-16
Attending: INTERNAL MEDICINE
Payer: MEDICARE

## 2025-08-16 ENCOUNTER — CLINICAL SUPPORT (OUTPATIENT)
Dept: CARDIOLOGY | Facility: HOSPITAL | Age: 70
End: 2025-08-16
Payer: MEDICARE

## 2025-08-16 DIAGNOSIS — I49.8 OTHER SPECIFIED CARDIAC ARRHYTHMIAS: ICD-10-CM

## 2025-08-16 PROCEDURE — 93298 REM INTERROG DEV EVAL SCRMS: CPT | Mod: 26,,, | Performed by: INTERNAL MEDICINE

## 2025-08-16 PROCEDURE — 93298 REM INTERROG DEV EVAL SCRMS: CPT | Mod: PO | Performed by: INTERNAL MEDICINE

## 2025-08-18 LAB
OHS CV AF BURDEN PERCENT: 5.6
OHS CV DC REMOTE DEVICE TYPE: NORMAL
OHS CV ICD SHOCK: NO

## 2025-08-21 LAB
OHS CV AF BURDEN PERCENT: 2.5
OHS CV DC REMOTE DEVICE TYPE: NORMAL

## 2025-08-26 ENCOUNTER — ANESTHESIA EVENT (OUTPATIENT)
Dept: SURGERY | Facility: HOSPITAL | Age: 70
End: 2025-08-26
Payer: MEDICARE

## 2025-08-27 ENCOUNTER — ANESTHESIA (OUTPATIENT)
Dept: SURGERY | Facility: HOSPITAL | Age: 70
End: 2025-08-27
Payer: MEDICARE

## 2025-08-27 ENCOUNTER — HOSPITAL ENCOUNTER (OUTPATIENT)
Facility: HOSPITAL | Age: 70
Discharge: HOME | End: 2025-08-27
Attending: STUDENT IN AN ORGANIZED HEALTH CARE EDUCATION/TRAINING PROGRAM | Admitting: STUDENT IN AN ORGANIZED HEALTH CARE EDUCATION/TRAINING PROGRAM
Payer: MEDICARE

## 2025-08-27 VITALS
HEART RATE: 66 BPM | DIASTOLIC BLOOD PRESSURE: 72 MMHG | TEMPERATURE: 97 F | HEIGHT: 65 IN | BODY MASS INDEX: 26.82 KG/M2 | OXYGEN SATURATION: 96 % | WEIGHT: 161 LBS | RESPIRATION RATE: 11 BRPM | SYSTOLIC BLOOD PRESSURE: 140 MMHG

## 2025-08-27 DIAGNOSIS — H25.12 NUCLEAR SCLEROTIC CATARACT, LEFT: Primary | ICD-10-CM

## 2025-08-27 PROCEDURE — 37000009 HC ANESTHESIA EA ADD 15 MINS: Mod: PO | Performed by: STUDENT IN AN ORGANIZED HEALTH CARE EDUCATION/TRAINING PROGRAM

## 2025-08-27 PROCEDURE — 63600175 PHARM REV CODE 636 W HCPCS: Mod: PO | Performed by: STUDENT IN AN ORGANIZED HEALTH CARE EDUCATION/TRAINING PROGRAM

## 2025-08-27 PROCEDURE — 63600175 PHARM REV CODE 636 W HCPCS: Mod: PO | Performed by: ANESTHESIOLOGY

## 2025-08-27 PROCEDURE — 25000003 PHARM REV CODE 250: Mod: PO | Performed by: STUDENT IN AN ORGANIZED HEALTH CARE EDUCATION/TRAINING PROGRAM

## 2025-08-27 PROCEDURE — 36000707: Mod: PO | Performed by: STUDENT IN AN ORGANIZED HEALTH CARE EDUCATION/TRAINING PROGRAM

## 2025-08-27 PROCEDURE — 66984 XCAPSL CTRC RMVL W/O ECP: CPT | Mod: LT,,, | Performed by: STUDENT IN AN ORGANIZED HEALTH CARE EDUCATION/TRAINING PROGRAM

## 2025-08-27 PROCEDURE — 99152 MOD SED SAME PHYS/QHP 5/>YRS: CPT | Mod: ,,, | Performed by: STUDENT IN AN ORGANIZED HEALTH CARE EDUCATION/TRAINING PROGRAM

## 2025-08-27 PROCEDURE — V2632 POST CHMBR INTRAOCULAR LENS: HCPCS | Mod: PO | Performed by: STUDENT IN AN ORGANIZED HEALTH CARE EDUCATION/TRAINING PROGRAM

## 2025-08-27 PROCEDURE — 25000003 PHARM REV CODE 250: Mod: PO

## 2025-08-27 PROCEDURE — 36000706: Mod: PO | Performed by: STUDENT IN AN ORGANIZED HEALTH CARE EDUCATION/TRAINING PROGRAM

## 2025-08-27 PROCEDURE — 71000015 HC POSTOP RECOV 1ST HR: Mod: PO | Performed by: STUDENT IN AN ORGANIZED HEALTH CARE EDUCATION/TRAINING PROGRAM

## 2025-08-27 PROCEDURE — 37000008 HC ANESTHESIA 1ST 15 MINUTES: Mod: PO | Performed by: STUDENT IN AN ORGANIZED HEALTH CARE EDUCATION/TRAINING PROGRAM

## 2025-08-27 PROCEDURE — 63600175 PHARM REV CODE 636 W HCPCS: Mod: PO | Performed by: NURSE ANESTHETIST, CERTIFIED REGISTERED

## 2025-08-27 DEVICE — IMPLANTABLE DEVICE: Type: IMPLANTABLE DEVICE | Site: EYE | Status: FUNCTIONAL

## 2025-08-27 RX ORDER — GLYCOPYRROLATE 0.2 MG/ML
INJECTION INTRAMUSCULAR; INTRAVENOUS
Status: DISCONTINUED | OUTPATIENT
Start: 2025-08-27 | End: 2025-08-27

## 2025-08-27 RX ORDER — EPINEPHRINE 1 MG/ML
INJECTION INTRAMUSCULAR; INTRAVENOUS; SUBCUTANEOUS
Status: DISCONTINUED | OUTPATIENT
Start: 2025-08-27 | End: 2025-08-27 | Stop reason: HOSPADM

## 2025-08-27 RX ORDER — MOXIFLOXACIN 5 MG/ML
SOLUTION/ DROPS OPHTHALMIC
Status: DISCONTINUED | OUTPATIENT
Start: 2025-08-27 | End: 2025-08-27 | Stop reason: HOSPADM

## 2025-08-27 RX ORDER — TROPICAMIDE 10 MG/ML
1 SOLUTION/ DROPS OPHTHALMIC
Status: COMPLETED | OUTPATIENT
Start: 2025-08-27 | End: 2025-08-27

## 2025-08-27 RX ORDER — SODIUM CHLORIDE, SODIUM LACTATE, POTASSIUM CHLORIDE, CALCIUM CHLORIDE 600; 310; 30; 20 MG/100ML; MG/100ML; MG/100ML; MG/100ML
INJECTION, SOLUTION INTRAVENOUS CONTINUOUS
Status: DISCONTINUED | OUTPATIENT
Start: 2025-08-27 | End: 2025-08-27 | Stop reason: HOSPADM

## 2025-08-27 RX ORDER — MIDAZOLAM HYDROCHLORIDE 1 MG/ML
INJECTION INTRAMUSCULAR; INTRAVENOUS
Status: DISCONTINUED | OUTPATIENT
Start: 2025-08-27 | End: 2025-08-27

## 2025-08-27 RX ORDER — PROPARACAINE HYDROCHLORIDE 5 MG/ML
1 SOLUTION/ DROPS OPHTHALMIC
Status: COMPLETED | OUTPATIENT
Start: 2025-08-27 | End: 2025-08-27

## 2025-08-27 RX ORDER — ONDANSETRON HYDROCHLORIDE 2 MG/ML
INJECTION, SOLUTION INTRAVENOUS
Status: DISCONTINUED | OUTPATIENT
Start: 2025-08-27 | End: 2025-08-27

## 2025-08-27 RX ORDER — PREDNISOLONE ACETATE 10 MG/ML
SUSPENSION/ DROPS OPHTHALMIC
Status: DISCONTINUED | OUTPATIENT
Start: 2025-08-27 | End: 2025-08-27 | Stop reason: HOSPADM

## 2025-08-27 RX ORDER — PHENYLEPHRINE HYDROCHLORIDE 100 MG/ML
1 SOLUTION/ DROPS OPHTHALMIC
Status: DISCONTINUED | OUTPATIENT
Start: 2025-08-27 | End: 2025-08-27 | Stop reason: HOSPADM

## 2025-08-27 RX ORDER — LIDOCAIN/PHENYLEPH/BSS NO.2/PF 1 %-1.5 %
SYRINGE (ML) INTRAOCULAR
Status: DISCONTINUED | OUTPATIENT
Start: 2025-08-27 | End: 2025-08-27 | Stop reason: HOSPADM

## 2025-08-27 RX ORDER — LIDOCAINE HYDROCHLORIDE 10 MG/ML
1 INJECTION, SOLUTION EPIDURAL; INFILTRATION; INTRACAUDAL; PERINEURAL ONCE
Status: DISCONTINUED | OUTPATIENT
Start: 2025-08-27 | End: 2025-08-27 | Stop reason: HOSPADM

## 2025-08-27 RX ORDER — FENTANYL CITRATE 50 UG/ML
INJECTION, SOLUTION INTRAMUSCULAR; INTRAVENOUS
Status: DISCONTINUED | OUTPATIENT
Start: 2025-08-27 | End: 2025-08-27

## 2025-08-27 RX ORDER — PHENYLEPHRINE HYDROCHLORIDE 25 MG/ML
1 SOLUTION/ DROPS OPHTHALMIC
Status: COMPLETED | OUTPATIENT
Start: 2025-08-27 | End: 2025-08-27

## 2025-08-27 RX ORDER — PROPARACAINE HYDROCHLORIDE 5 MG/ML
SOLUTION/ DROPS OPHTHALMIC
Status: DISCONTINUED | OUTPATIENT
Start: 2025-08-27 | End: 2025-08-27 | Stop reason: HOSPADM

## 2025-08-27 RX ADMIN — GLYCOPYRROLATE 0.2 MG: 0.2 INJECTION INTRAMUSCULAR; INTRAVENOUS at 11:08

## 2025-08-27 RX ADMIN — TROPICAMIDE 1 DROP: 10 SOLUTION/ DROPS OPHTHALMIC at 10:08

## 2025-08-27 RX ADMIN — PHENYLEPHRINE HYDROCHLORIDE 1 DROP: 25 SOLUTION/ DROPS OPHTHALMIC at 10:08

## 2025-08-27 RX ADMIN — PROPARACAINE HYDROCHLORIDE 1 DROP: 5 SOLUTION/ DROPS OPHTHALMIC at 10:08

## 2025-08-27 RX ADMIN — SODIUM CHLORIDE, POTASSIUM CHLORIDE, SODIUM LACTATE AND CALCIUM CHLORIDE: 600; 310; 30; 20 INJECTION, SOLUTION INTRAVENOUS at 10:08

## 2025-08-27 RX ADMIN — FENTANYL CITRATE 25 MCG: 50 INJECTION, SOLUTION INTRAMUSCULAR; INTRAVENOUS at 11:08

## 2025-08-27 RX ADMIN — ONDANSETRON 4 MG: 2 INJECTION, SOLUTION INTRAMUSCULAR; INTRAVENOUS at 10:08

## 2025-08-27 RX ADMIN — MIDAZOLAM HYDROCHLORIDE 2 MG: 1 INJECTION, SOLUTION INTRAMUSCULAR; INTRAVENOUS at 10:08

## 2025-08-28 ENCOUNTER — OFFICE VISIT (OUTPATIENT)
Dept: OPHTHALMOLOGY | Facility: CLINIC | Age: 70
End: 2025-08-28
Payer: MEDICARE

## 2025-08-28 DIAGNOSIS — Z98.42 STATUS POST CATARACT EXTRACTION AND INSERTION OF INTRAOCULAR LENS OF LEFT EYE: Primary | ICD-10-CM

## 2025-08-28 DIAGNOSIS — Z96.1 STATUS POST CATARACT EXTRACTION AND INSERTION OF INTRAOCULAR LENS OF LEFT EYE: Primary | ICD-10-CM

## 2025-08-28 DIAGNOSIS — Z96.1 BILATERAL PSEUDOPHAKIA: ICD-10-CM

## 2025-08-28 PROCEDURE — 99212 OFFICE O/P EST SF 10 MIN: CPT | Mod: PBBFAC,PO | Performed by: STUDENT IN AN ORGANIZED HEALTH CARE EDUCATION/TRAINING PROGRAM

## 2025-08-28 PROCEDURE — 99999 PR PBB SHADOW E&M-EST. PATIENT-LVL II: CPT | Mod: PBBFAC,,, | Performed by: STUDENT IN AN ORGANIZED HEALTH CARE EDUCATION/TRAINING PROGRAM

## 2025-09-02 ENCOUNTER — OFFICE VISIT (OUTPATIENT)
Dept: OPHTHALMOLOGY | Facility: CLINIC | Age: 70
End: 2025-09-02
Payer: MEDICARE

## 2025-09-02 DIAGNOSIS — H04.123 DRY EYE SYNDROME OF BOTH EYES: ICD-10-CM

## 2025-09-02 DIAGNOSIS — Z98.42 STATUS POST CATARACT EXTRACTION AND INSERTION OF INTRAOCULAR LENS OF LEFT EYE: Primary | ICD-10-CM

## 2025-09-02 DIAGNOSIS — Z96.1 STATUS POST CATARACT EXTRACTION AND INSERTION OF INTRAOCULAR LENS OF LEFT EYE: Primary | ICD-10-CM

## 2025-09-02 DIAGNOSIS — Z96.1 BILATERAL PSEUDOPHAKIA: ICD-10-CM

## 2025-09-02 PROCEDURE — 99999 PR PBB SHADOW E&M-EST. PATIENT-LVL III: CPT | Mod: PBBFAC,,, | Performed by: STUDENT IN AN ORGANIZED HEALTH CARE EDUCATION/TRAINING PROGRAM

## 2025-09-02 PROCEDURE — 99213 OFFICE O/P EST LOW 20 MIN: CPT | Mod: PBBFAC,PO | Performed by: STUDENT IN AN ORGANIZED HEALTH CARE EDUCATION/TRAINING PROGRAM

## (undated) DEVICE — SOL IRR STRL WATER 500ML

## (undated) DEVICE — SOL IRR 0.9% NACL 500ML PB

## (undated) DEVICE — SOL BSS BALANCED SALT

## (undated) DEVICE — GLOVE SENSICARE PI SURG 7.5

## (undated) DEVICE — SOL BETADINE 5%

## (undated) DEVICE — GARTER EYE ADULT

## (undated) DEVICE — DRAPE HALF SURGICAL 40X58IN

## (undated) DEVICE — Device

## (undated) DEVICE — SOL POVIDONE SCRUB IODINE 4 OZ

## (undated) DEVICE — GOWN POLY REINF BRTH SLV XL

## (undated) DEVICE — PACK EYE CUSTOM COVINGTON.

## (undated) DEVICE — GLOVE SENSICARE PI ALOE 6.5

## (undated) DEVICE — HANDLE SURG LIGHT NONRIGID

## (undated) DEVICE — HYDRODISSECTOR NUCLEUS 27GX7/8